# Patient Record
Sex: FEMALE | Race: WHITE | Employment: OTHER | URBAN - METROPOLITAN AREA
[De-identification: names, ages, dates, MRNs, and addresses within clinical notes are randomized per-mention and may not be internally consistent; named-entity substitution may affect disease eponyms.]

---

## 2017-02-17 ENCOUNTER — OFFICE VISIT (OUTPATIENT)
Dept: FAMILY MEDICINE | Facility: CLINIC | Age: 42
End: 2017-02-17
Payer: COMMERCIAL

## 2017-02-17 VITALS
WEIGHT: 167.2 LBS | TEMPERATURE: 97.9 F | DIASTOLIC BLOOD PRESSURE: 63 MMHG | SYSTOLIC BLOOD PRESSURE: 102 MMHG | BODY MASS INDEX: 26.24 KG/M2 | OXYGEN SATURATION: 99 % | HEART RATE: 68 BPM | HEIGHT: 67 IN

## 2017-02-17 DIAGNOSIS — T83.32XA IUD STRINGS LOST: ICD-10-CM

## 2017-02-17 DIAGNOSIS — Z71.6 ENCOUNTER FOR SMOKING CESSATION COUNSELING: ICD-10-CM

## 2017-02-17 DIAGNOSIS — Z12.39 BREAST CANCER SCREENING: ICD-10-CM

## 2017-02-17 DIAGNOSIS — Z01.419 WELL WOMAN EXAM WITH ROUTINE GYNECOLOGICAL EXAM: Primary | ICD-10-CM

## 2017-02-17 PROBLEM — Z30.430 ENCOUNTER FOR INSERTION OF MIRENA IUD: Status: ACTIVE | Noted: 2017-02-17

## 2017-02-17 PROCEDURE — 87624 HPV HI-RISK TYP POOLED RSLT: CPT | Performed by: FAMILY MEDICINE

## 2017-02-17 PROCEDURE — G0145 SCR C/V CYTO,THINLAYER,RESCR: HCPCS | Performed by: FAMILY MEDICINE

## 2017-02-17 PROCEDURE — 99396 PREV VISIT EST AGE 40-64: CPT | Performed by: FAMILY MEDICINE

## 2017-02-17 NOTE — NURSING NOTE
"Chief Complaint   Patient presents with     Physical     NOT fasting       Initial /63 (Cuff Size: Adult Regular)  Pulse 68  Temp 97.9  F (36.6  C) (Tympanic)  Ht 5' 6.5\" (1.689 m)  Wt 167 lb 3.2 oz (75.8 kg)  SpO2 99%  BMI 26.58 kg/m2 Estimated body mass index is 26.58 kg/(m^2) as calculated from the following:    Height as of this encounter: 5' 6.5\" (1.689 m).    Weight as of this encounter: 167 lb 3.2 oz (75.8 kg).  Medication Reconciliation: complete  "

## 2017-02-17 NOTE — PATIENT INSTRUCTIONS
Schedule IUD removal and reinsertion with OBGYN when due      Thank you for choosing [facility].  You may be receiving a survey in the mail from Jamey Beth regarding your visit today.  Please take a few minutes to complete and return the survey to let us know how we are doing.      If you have questions or concerns, please contact us via [employer] or you can contact your care team at [phone].    Our Clinic hours are:  Monday 6:40 am  to 7:00 pm  Tuesday -Friday 6:40 am to 5:00 pm    The [place] outpatient lab hours are:  Monday - Friday 6:10 am to 4:45 pm  Saturdays 7:00 am to 11:00 am  Appointments are required, call [phone]    If you have clinical questions after hours or would like to schedule an appointment,  call the clinic at [phone].  Preventive Health Recommendations  Female Ages 40 to 49    Yearly exam:     See your health care provider every year in order to  1. Review health changes.   2. Discuss preventive care.    3. Review your medicines if your doctor prescribed any.      Get a Pap test every three years (unless you have an abnormal result and your provider advises testing more often).      If you get Pap tests with HPV test, you only need to test every 5 years, unless you have an abnormal result. You do not need a Pap test if your uterus was removed (hysterectomy) and you have not had cancer.      You should be tested each year for STDs (sexually transmitted diseases), if you're at risk.       Ask your doctor if you should have a mammogram.      Have a colonoscopy (test for colon cancer) if someone in your family has had colon cancer or polyps before age 50.       Have a cholesterol test every 5 years.       Have a diabetes test (fasting glucose) after age 45. If you are at risk for diabetes, you should have this test every 3 years.    Shots: Get a flu shot each year. Get a tetanus shot every 10 years.     Nutrition:     Eat at least 5 servings of fruits and vegetables each  day.    Eat whole-grain bread, whole-wheat pasta and brown rice instead of white grains and rice.    Talk to your provider about Calcium and Vitamin D.     Lifestyle    Exercise at least 150 minutes a week (an average of 30 minutes a day, 5 days a week). This will help you control your weight and prevent disease.    Limit alcohol to one drink per day.    No smoking.     Wear sunscreen to prevent skin cancer.    See your dentist every six months for an exam and cleaning.

## 2017-02-17 NOTE — LETTER
[facility]  [address]  [facility] [address]  [phone]      February 28, 2017    Rosalie MOORE [facility]  [address] [place] [id number]    Dear Rosalie,  We are happy to inform you that your PAP smear result from 2/17/17 is normal.  We are now able to do a follow up test on PAP smears. The DNA test is for HPV (Human Papilloma Virus). Cervical cancer is closely linked with certain types of HPV. Your result showed no evidence of high risk HPV.  Therefore we recommend you return in 3-5 years for your next pap smear and HPV test.  You will still need to return to the clinic every year for an annual exam and other preventive tests.  Please contact the clinic with any questions.  Sincerely,  Ge Delgado MD/emi

## 2017-02-17 NOTE — PROGRESS NOTES
SUBJECTIVE:     CC: Rosalie Sauceda is an 42 year old woman who presents for preventive health visit.     Healthy Habits:    Do you get at least three servings of calcium containing foods daily (dairy, green leafy vegetables, etc.)? no    Amount of exercise or daily activities, outside of work: 5 day(s) per week    Problems taking medications regularly not applicable    Medication side effects: No    Have you had an eye exam in the past two years? yes    Do you see a dentist twice per year? No, once per year    Do you have sleep apnea, excessive snoring or daytime drowsiness?no        PROBLEMS TO ADD ON...  Mammogram  Numbness in both hands - more left than right however all fingers of the hand feel heavy when wakes up  In the morning    Today's PHQ-2 Score:   PHQ-2 ( 1999 Pf[employer]) 2/17/2017 11/12/2015   Q1: Little interest or pleasure in doing things 0 0   Q2: Feeling down, depressed or hopeless 0 0   PHQ-2 Score 0 0       Abuse: Current or Past(Physical, Sexual or Emotional)- No  Do you feel safe in your environment - Yes    Social History   Substance Use Topics     Smoking status: Current Every Day Smoker     Packs/day: 1.00     Years: 12.00     Types: Cigars, Cigarettes     Smokeless tobacco: Not on file      Comment: not ready yet 11/12/15     Alcohol use Yes      Comment: 10 drinks per month     The patient does not drink >3 drinks per day nor >7 drinks per week.    Recent Labs   Lab Test  05/20/15   0926   CHOL  182   HDL  46*   LDL  120   TRIG  81   CHOLHDLRATIO  4.0       Reviewed orders with patient.  Reviewed health maintenance and updated orders accordingly - Yes    Mammo Decision Support:  Patient under age 50, mutual decision reflected in health maintenance.      Pertinent mammograms are reviewed under the imaging tab.  History of abnormal Pap smear: NO - age 30-65 PAP every 5 years with negative HPV co-testing recommended  All Histories reviewed and updated in Epic.    ROS:  C: NEGATIVE for fever,  "chills, change in weight  I: NEGATIVE for worrisome rashes, moles or lesions  E: NEGATIVE for vision changes or irritation  ENT: NEGATIVE for ear, mouth and throat problems  R: NEGATIVE for significant cough or SOB  B: NEGATIVE for masses, tenderness or discharge  CV: NEGATIVE for chest pain, palpitations or peripheral edema  GI: NEGATIVE for nausea, abdominal pain, heartburn, or change in bowel habits  : NEGATIVE for unusual urinary or vaginal symptoms. Periods are very minimal and rare with Mirena IUD.  M: NEGATIVE for significant arthralgias or myalgia  N: NEGATIVE for weakness, dizziness or paresthesias  P: NEGATIVE for changes in mood or affect    Problem list, Medication list, Allergies, and Medical/Social/Surgical histories reviewed in EPIC and updated as appropriate.  OBJECTIVE:     /63 (Cuff Size: Adult Regular)  Pulse 68  Temp 97.9  F (36.6  C) (Tympanic)  Ht 5' 6.5\" (1.689 m)  Wt 167 lb 3.2 oz (75.8 kg)  SpO2 99%  BMI 26.58 kg/m2  EXAM:  GENERAL: healthy, alert and no distress  EYES: Eyes grossly normal to inspection, PERRL and conjunctivae and sclerae normal  HENT: ear canals and TM's normal, nose and mouth without ulcers or lesions  NECK: no adenopathy, no asymmetry, masses, or scars and thyroid normal to palpation  RESP: lungs clear to auscultation - no rales, rhonchi or wheezes  BREAST: normal without masses, tenderness or nipple discharge and no palpable axillary masses or adenopathy  CV: regular rate and rhythm, normal S1 S2, no S3 or S4, no murmur, click or rub, no peripheral edema and peripheral pulses strong  ABDOMEN: soft, nontender, no hepatosplenomegaly, no masses and bowel sounds normal   (female): no IUD strings seen.  normal female external genitalia, normal urethral meatus, vaginal mucosa pink, moist, well rugated, and normal cervix/adnexa/uterus without masses or discharge  MS: no gross musculoskeletal defects noted, no edema  SKIN: no suspicious lesions or rashes  NEURO: Normal " "strength and tone, mentation intact and speech normal  PSYCH: mentation appears normal, affect normal/bright    ASSESSMENT/PLAN:     Rosalie was seen today for physical.    Diagnoses and all orders for this visit:    Well woman exam with routine gynecological exam  -     Pap imaged thin layer screen with HPV - recommended age 30 - 65 years (select HPV order below)  -     HPV High Risk Types DNA Cervical    Breast cancer screening  -     *MA Screening Digital Bilateral; Future    Encounter for smoking cessation counseling  -     nicotine polacrilex (NICOTINE MINI) 4 MG lozenge; Place 1 lozenge (4 mg) inside cheek as needed for smoking cessation    IUD strings lost: had Xray of sacrum that showed IUD and is not having periods so likely still in place will schedule removal and replacement with OBGYN        COUNSELING:   Reviewed preventive health counseling, as reflected in patient instructions       Regular exercise       Healthy diet/nutrition       Vision screening       Contraception         reports that she has been smoking Cigars and Cigarettes.  She has a 12.00 pack-year smoking history. She does not have any smokeless tobacco history on file.  Tobacco Cessation Action Plan: Has quit 1 week ago with lozenges and would like refill  Estimated body mass index is 26.58 kg/(m^2) as calculated from the following:    Height as of this encounter: 5' 6.5\" (1.689 m).    Weight as of this encounter: 167 lb 3.2 oz (75.8 kg).   Weight management plan: Discussed healthy diet and exercise guidelines and patient will follow up in 12 months in clinic to re-evaluate.    Counseling Resources:  ATP IV Guidelines  Pooled Cohorts Equation Calculator  Breast Cancer Risk Calculator  FRAX Risk Assessment  ICSI Preventive Guidelines  Dietary Guidelines for Americans, 2010  USDA's MyPlate  ASA Prophylaxis  Lung CA Screening    Ge Delgado MD  [facility]  "

## 2017-02-17 NOTE — MR AVS SNAPSHOT
After Visit Summary   2/17/2017    Rosalie Sauceda    MRN: [id number]           Patient Information     Date Of Birth          1975        Visit Information        Provider Department      2/17/2017 9:40 AM Ge Delgado MD [facility]        Today's Diagnoses     Well woman exam with routine gynecological exam    -  1    Breast cancer screening        Encounter for smoking cessation counseling        IUD strings lost          Care Instructions    Schedule IUD removal and reinsertion with OBGYN when due      Thank you for choosing [facility].  You may be receiving a survey in the mail from Jamey Beth regarding your visit today.  Please take a few minutes to complete and return the survey to let us know how we are doing.      If you have questions or concerns, please contact us via [email or web address] or you can contact your care team at [phone].    Our Clinic hours are:  Monday 6:40 am  to 7:00 pm  Tuesday -Friday 6:40 am to 5:00 pm    The [place] outpatient lab hours are:  Monday - Friday 6:10 am to 4:45 pm  Saturdays 7:00 am to 11:00 am  Appointments are required, call [phone]    If you have clinical questions after hours or would like to schedule an appointment,  call the clinic at [phone].  Preventive Health Recommendations  Female Ages 40 to 49    Yearly exam:     See your health care provider every year in order to  1. Review health changes.   2. Discuss preventive care.    3. Review your medicines if your doctor prescribed any.      Get a Pap test every three years (unless you have an abnormal result and your provider advises testing more often).      If you get Pap tests with HPV test, you only need to test every 5 years, unless you have an abnormal result. You do not need a Pap test if your uterus was removed (hysterectomy) and you have not had cancer.      You should be tested each year for STDs (sexually transmitted diseases), if you're at risk.       Ask  your doctor if you should have a mammogram.      Have a colonoscopy (test for colon cancer) if someone in your family has had colon cancer or polyps before age 50.       Have a cholesterol test every 5 years.       Have a diabetes test (fasting glucose) after age 45. If you are at risk for diabetes, you should have this test every 3 years.    Shots: Get a flu shot each year. Get a tetanus shot every 10 years.     Nutrition:     Eat at least 5 servings of fruits and vegetables each day.    Eat whole-grain bread, whole-wheat pasta and brown rice instead of white grains and rice.    Talk to your provider about Calcium and Vitamin D.     Lifestyle    Exercise at least 150 minutes a week (an average of 30 minutes a day, 5 days a week). This will help you control your weight and prevent disease.    Limit alcohol to one drink per day.    No smoking.     Wear sunscreen to prevent skin cancer.    See your dentist every six months for an exam and cleaning.        Follow-ups after your visit        Future tests that were ordered for you today     Open Future Orders        Priority Expected Expires Ordered    *MA Screening Digital Bilateral Routine  2/17/2018 2/17/2017            Who to contact     If you have questions or need follow up information about today's clinic visit or your schedule please contact [facility] directly at [phone].  Normal or non-critical lab and imaging results will be communicated to you by MyChart, letter or phone within 4 business days after the clinic has received the results. If you do not hear from us within 7 days, please contact the clinic through [employer] or phone. If you have a critical or abnormal lab result, we will notify you by phone as soon as possible.  Submit refill requests through [employer] or call your pharmacy and they will forward the refill request to us. Please allow 3 business days for your refill to be completed.          Additional Information About Your  "Visit        [employer] Information     [employer] lets you send messages to your doctor, view your test results, renew your prescriptions, schedule appointments and more. To sign up, go to [email or web address] . Click on \"Log in\" on the left side of the screen, which will take you to the Welcome page. Then click on \"Sign up Now\" on the right side of the page.     You will be asked to enter the access code listed below, as well as some personal information. Please follow the directions to create your username and password.     Your access code is: [email or web address]  Expires: 2017 10:21 AM     Your access code will  in 90 days. If you need help or a new code, please call your [place] clinic or [phone].        Care EveryWhere ID     This is your Care EveryWhere ID. This could be used by other organizations to access your [place] medical records  [id number]        Your Vitals Were     Pulse Temperature Height Pulse Oximetry BMI (Body Mass Index)       68 97.9  F (36.6  C) (Tympanic) 5' 6.5\" (1.689 m) 99% 26.58 kg/m2        Blood Pressure from Last 3 Encounters:   17 102/63   16 134/86   16 123/86    Weight from Last 3 Encounters:   17 167 lb 3.2 oz (75.8 kg)   16 165 lb (74.8 kg)   11/12/15 161 lb 6.4 oz (73.2 kg)              We Performed the Following     HPV High Risk Types DNA Cervical     Pap imaged thin layer screen with HPV - recommended age 30 - 65 years (select HPV order below)          Today's Medication Changes          These changes are accurate as of: 17 10:21 AM.  If you have any questions, ask your nurse or doctor.               Start taking these medicines.        Dose/Directions    nicotine polacrilex 4 MG lozenge   Commonly known as:  NICOTINE MINI   Used for:  Encounter for smoking cessation counseling   Started by:  Ge Delgado MD        Dose:  4 mg   Place 1 lozenge (4 mg) inside cheek as needed for smoking cessation   Quantity:  48 tablet "   Refills:  1         These medicines have changed or have updated prescriptions.        Dose/Directions    fluticasone 50 MCG/ACT spray   Commonly known as:  FLONASE   This may have changed:    - when to take this  - reasons to take this   Used for:  Otitis media with effusion, bilateral        Dose:  1-2 spray   Spray 1-2 sprays into both nostrils daily   Quantity:  1 Bottle   Refills:  11            Where to get your medicines      These medications were sent to [place], [place] - [address] [facility] [facility]  [address] [facility] [address]     Phone:  [phone]     nicotine polacrilex 4 MG lozenge                Primary Care Provider Office Phone #    [facility] [phone] [address] [facility] [address]        Thank you!     Thank you for choosing [facility]  for your care. Our goal is always to provide you with excellent care. Hearing back from our patients is one way we can continue to improve our services. Please take a few minutes to complete the written survey that you may receive in the mail after your visit with us. Thank you!             Your Updated Medication List - Protect others around you: Learn how to safely use, store and throw away your medicines at www.disposemymeds.org.          This list is accurate as of: 2/17/17 10:21 AM.  Always use your most recent med list.                   Brand Name Dispense Instructions for use    fluticasone 50 MCG/ACT spray    FLONASE    1 Bottle    Spray 1-2 sprays into both nostrils daily       nicotine polacrilex 4 MG lozenge    NICOTINE MINI    48 tablet    Place 1 lozenge (4 mg) inside cheek as needed for smoking cessation

## 2017-02-21 LAB
COPATH REPORT: NORMAL
PAP: NORMAL

## 2017-02-22 LAB
FINAL DIAGNOSIS: NORMAL
HPV HR 12 DNA CVX QL NAA+PROBE: NEGATIVE
HPV16 DNA SPEC QL NAA+PROBE: NEGATIVE
HPV18 DNA SPEC QL NAA+PROBE: NEGATIVE
SPECIMEN DESCRIPTION: NORMAL

## 2017-02-27 ENCOUNTER — HOSPITAL ENCOUNTER (OUTPATIENT)
Dept: MAMMOGRAPHY | Facility: CLINIC | Age: 42
Discharge: HOME | End: 2017-02-27
Attending: FAMILY MEDICINE | Admitting: FAMILY MEDICINE
Payer: COMMERCIAL

## 2017-02-27 DIAGNOSIS — Z12.31 VISIT FOR SCREENING MAMMOGRAM: ICD-10-CM

## 2017-02-27 PROCEDURE — G0202 SCR MAMMO BI INCL CAD: HCPCS

## 2017-04-12 ENCOUNTER — OFFICE VISIT (OUTPATIENT)
Dept: OBGYN | Facility: CLINIC | Age: 42
End: 2017-04-12
Payer: COMMERCIAL

## 2017-04-12 VITALS
HEIGHT: 67 IN | BODY MASS INDEX: 27.34 KG/M2 | HEART RATE: 74 BPM | SYSTOLIC BLOOD PRESSURE: 126 MMHG | WEIGHT: 174.2 LBS | DIASTOLIC BLOOD PRESSURE: 75 MMHG

## 2017-04-12 DIAGNOSIS — Z97.5 PRESENCE OF INTRAUTERINE CONTRACEPTIVE DEVICE (IUD): ICD-10-CM

## 2017-04-12 DIAGNOSIS — Z30.430 ENCOUNTER FOR INSERTION OF MIRENA IUD: Primary | ICD-10-CM

## 2017-04-12 DIAGNOSIS — Z30.432 ENCOUNTER FOR IUD REMOVAL: ICD-10-CM

## 2017-04-12 PROCEDURE — 58300 INSERT INTRAUTERINE DEVICE: CPT | Performed by: OBSTETRICS & GYNECOLOGY

## 2017-04-12 PROCEDURE — 58301 REMOVE INTRAUTERINE DEVICE: CPT | Performed by: OBSTETRICS & GYNECOLOGY

## 2017-04-12 PROCEDURE — 99202 OFFICE O/P NEW SF 15 MIN: CPT | Mod: 25 | Performed by: OBSTETRICS & GYNECOLOGY

## 2017-04-12 NOTE — NURSING NOTE
"Initial /75 (BP Location: Left arm, Cuff Size: Adult Regular)  Pulse 74  Ht 5' 6.5\" (1.689 m)  Wt 174 lb 3.2 oz (79 kg)  BMI 27.7 kg/m2 Estimated body mass index is 27.7 kg/(m^2) as calculated from the following:    Height as of this encounter: 5' 6.5\" (1.689 m).    Weight as of this encounter: 174 lb 3.2 oz (79 kg). .      "

## 2017-04-12 NOTE — PROGRESS NOTES
"CC:  IUD insertion  HPI:  Rosalie Sauceda is a 42 year old female No LMP recorded. Patient is not currently having periods (Reason: IUD).  Menses are rare,.   No other c/o.  She is here for an IUD insertion. Patient has verbalized understanding of risks and benefits and has signed the consent form.    Past GYN history:  No STD history       Last PAP smear:  Normal  Manogomous relationship? Yes    The patient's past medical history, social history and family history is reviewed at our visit and updated in EPIC.    Allergies: Amoxicillin and Penicillins    Current Outpatient Prescriptions   Medication Sig Dispense Refill     nicotine polacrilex (NICOTINE MINI) 4 MG lozenge Place 1 lozenge (4 mg) inside cheek as needed for smoking cessation 48 tablet 1     fluticasone (FLONASE) 50 MCG/ACT nasal spray Spray 1-2 sprays into both nostrils daily (Patient taking differently: Spray 1-2 sprays into both nostrils as needed ) 1 Bottle 11         ROS:  C: NEGATIVE for fever, chills, change in weight  R: NEGATIVE for significant cough or SOB  CV: NEGATIVE for chest pain, palpitations or peripheral edema  GI: NEGATIVE for nausea, abdominal pain, heartburn, or change in bowel habits  : NEGATIVE for frequency, dysuria, hematuria, vaginal discharge, or irregular bleeding      EXAM:  Blood pressure 126/75, pulse 74, height 5' 6.5\" (1.689 m), weight 174 lb 3.2 oz (79 kg), not currently breastfeeding.  General - pleasant female in no acute distress.  Pelvic - EG: normal adult female, BUS: within normal limits, Vagina: well rugated, no discharge, Cervix: no lesions or CMT, Uterus: firm, normal sized and nontender, Adnexae: no masses or tenderness.    PROCEDURE:  After informed consent was obtained from the patient, a speculum was placed in the vagina to visualized the cervix.  The cervix was then swabbed with a betadine prep and 1% lidocaine paracervical block applied.  Tenaculum was placed at the 12 o'clock position on the cervix "   Procedure:  The  IUD strings were grasped with ring forcep and IUD easily removed intact with minimal patient discomfort noted.  No bleeding noted.and the uterus sounded to 7.5cm.  The Mirena  IUD was then placed in the usual fashion under sterile technique.  Strings were clipped about 2 cm from the cervical os.  Tenaculum was removed and cervix was hemostatic. There were no complications. The patient tolerated the procedure well.    lot # [id number], exp. 12/19    NDC#:97038-871-37 (MIRENA)      ASSESSMENT/PLAN:  (Z30.640) Encounter for insertion of mirena IUD  (primary encounter diagnosis)  Comment: replacement    (Z97.5) Presence of intrauterine contraceptive device (IUD)  Comment: Mirena at the end of its useful life  Plan:    (Z36.522) Encounter for IUD removal  Comment:   Plan:          The patient should feel for the IUD strings after her next menses.  If unable to locate them, she should return to clinic for a speculum examination for confirmation that the IUD is in place. Bleeding pattern of this particular IUD was discussed with the patient. She is aware that the IUD will need to be removed in 5 years or PRN.  She is to return to clinic for her next annual or PRN.      Ke Cox

## 2017-04-12 NOTE — MR AVS SNAPSHOT
"              After Visit Summary   4/12/2017    Rosalie Sauceda    MRN: [id number]           Patient Information     Date Of Birth          1975        Visit Information        Provider Department      4/12/2017 10:30 AM Ke Cox MD [facility]        Today's Diagnoses     Encounter for insertion of mirena IUD    -  1    Presence of intrauterine contraceptive device (IUD)        Encounter for IUD removal           Follow-ups after your visit        Follow-up notes from your care team     Return in about 1 year (around 4/12/2018).      Who to contact     If you have questions or need follow up information about today's clinic visit or your schedule please contact [facility] directly at [phone].  Normal or non-critical lab and imaging results will be communicated to you by MyChart, letter or phone within 4 business days after the clinic has received the results. If you do not hear from us within 7 days, please contact the clinic through [employer] or phone. If you have a critical or abnormal lab result, we will notify you by phone as soon as possible.  Submit refill requests through [employer] or call your pharmacy and they will forward the refill request to us. Please allow 3 business days for your refill to be completed.          Additional Information About Your Visit        MyChart Information     [employer] lets you send messages to your doctor, view your test results, renew your prescriptions, schedule appointments and more. To sign up, go to [email or web address] . Click on \"Log in\" on the left side of the screen, which will take you to the Welcome page. Then click on \"Sign up Now\" on the right side of the page.     You will be asked to enter the access code listed below, as well as some personal information. Please follow the directions to create your username and password.     Your access code is: [email or web address]  Expires: 5/18/2017 11:21 AM     Your access code will " " in 90 days. If you need help or a new code, please call your [place] clinic or [phone].        Care EveryWhere ID     This is your Care EveryWhere ID. This could be used by other organizations to access your [place] medical records  [id number]        Your Vitals Were     Pulse Height BMI (Body Mass Index)             74 5' 6.5\" (1.689 m) 27.7 kg/m2          Blood Pressure from Last 3 Encounters:   17 126/75   17 102/63   16 134/86    Weight from Last 3 Encounters:   17 174 lb 3.2 oz (79 kg)   17 167 lb 3.2 oz (75.8 kg)   16 165 lb (74.8 kg)              We Performed the Following     INSERTION INTRAUTERINE DEVICE     REMOVE INTRAUTERINE DEVICE          Today's Medication Changes          These changes are accurate as of: 17 11:25 AM.  If you have any questions, ask your nurse or doctor.               Start taking these medicines.        Dose/Directions    levonorgestrel 20 MCG/24HR IUD   Commonly known as:  MIRENA   Used for:  Encounter for insertion of mirena IUD   Started by:  Ke Cox MD        Dose:  1 each   1 each (20 mcg) by Intrauterine route once for 1 dose   Quantity:  1 each   Refills:  0         These medicines have changed or have updated prescriptions.        Dose/Directions    fluticasone 50 MCG/ACT spray   Commonly known as:  FLONASE   This may have changed:    - when to take this  - reasons to take this   Used for:  Otitis media with effusion, bilateral        Dose:  1-2 spray   Spray 1-2 sprays into both nostrils daily   Quantity:  1 Bottle   Refills:  11            Where to get your medicines      Some of these will need a paper prescription and others can be bought over the counter.  Ask your nurse if you have questions.     You don't need a prescription for these medications     levonorgestrel 20 MCG/24HR IUD                Primary Care Provider Office Phone #    [place] [facility] [phone] [address]  [facility] [place] [address]        Thank you!     Thank you for choosing [facility]  for your care. Our goal is always to provide you with excellent care. Hearing back from our patients is one way we can continue to improve our services. Please take a few minutes to complete the written survey that you may receive in the mail after your visit with us. Thank you!             Your Updated Medication List - Protect others around you: Learn how to safely use, store and throw away your medicines at www.disposemymeds.org.          This list is accurate as of: 4/12/17 11:25 AM.  Always use your most recent med list.                   Brand Name Dispense Instructions for use    fluticasone 50 MCG/ACT spray    FLONASE    1 Bottle    Spray 1-2 sprays into both nostrils daily       levonorgestrel 20 MCG/24HR IUD    MIRENA    1 each    1 each (20 mcg) by Intrauterine route once for 1 dose       nicotine polacrilex 4 MG lozenge    NICOTINE MINI    48 tablet    Place 1 lozenge (4 mg) inside cheek as needed for smoking cessation

## 2017-08-03 ENCOUNTER — OFFICE VISIT (OUTPATIENT)
Dept: FAMILY MEDICINE | Facility: CLINIC | Age: 42
End: 2017-08-03
Payer: COMMERCIAL

## 2017-08-03 VITALS
HEIGHT: 67 IN | WEIGHT: 170.6 LBS | BODY MASS INDEX: 26.78 KG/M2 | TEMPERATURE: 97.6 F | HEART RATE: 84 BPM | SYSTOLIC BLOOD PRESSURE: 112 MMHG | DIASTOLIC BLOOD PRESSURE: 75 MMHG

## 2017-08-03 DIAGNOSIS — Z23 ENCOUNTER FOR IMMUNIZATION: ICD-10-CM

## 2017-08-03 DIAGNOSIS — M77.12 LATERAL EPICONDYLITIS OF LEFT ELBOW: Primary | ICD-10-CM

## 2017-08-03 PROCEDURE — 99212 OFFICE O/P EST SF 10 MIN: CPT | Mod: 25 | Performed by: NURSE PRACTITIONER

## 2017-08-03 PROCEDURE — 90471 IMMUNIZATION ADMIN: CPT | Performed by: NURSE PRACTITIONER

## 2017-08-03 PROCEDURE — 90715 TDAP VACCINE 7 YRS/> IM: CPT | Performed by: NURSE PRACTITIONER

## 2017-08-03 NOTE — NURSING NOTE
"Chief Complaint   Patient presents with     Elbow Pain     x2 months        Initial /75  Pulse 84  Temp 97.6  F (36.4  C) (Tympanic)  Ht 5' 6.5\" (1.689 m)  Wt 170 lb 9.6 oz (77.4 kg)  BMI 27.12 kg/m2 Estimated body mass index is 27.12 kg/(m^2) as calculated from the following:    Height as of this encounter: 5' 6.5\" (1.689 m).    Weight as of this encounter: 170 lb 9.6 oz (77.4 kg).  Medication Reconciliation: complete  "

## 2017-08-03 NOTE — PROGRESS NOTES
"  SUBJECTIVE:                                                    Rosalie Sauceda is a 42 year old female who presents to clinic today for the following health issues:  Left elbow pain for about 1 month. The patient works as  and she also exercise regularly perform upper body weight lifting. Noted lateral side left elbow mild swelling, tried icing and Alive.      Left elbow pain     Onset: 2 months     Description:   Location: left elbow  Character: Sharp pain to the touch and Dull ache    Intensity: moderate    Progression of Symptoms: worse    Accompanying Signs & Symptoms:  Other symptoms: none     History:   Previous similar pain: no       Precipitating factors:   Trauma or overuse: no     Alleviating factors:  Improved by: nothing    Therapies Tried and outcome: Aleve     Problem list and histories reviewed & adjusted, as indicated.  Additional history: as documented    Labs reviewed in EPIC    Reviewed and updated as needed this visit by clinical staffTobacco  Allergies  Med Hx  Surg Hx  Fam Hx  Soc Hx      Reviewed and updated as needed this visit by Provider         ROS:  Constitutional, HEENT, cardiovascular, pulmonary, gi and gu systems are negative, except as otherwise noted.      OBJECTIVE:   /75  Pulse 84  Temp 97.6  F (36.4  C) (Tympanic)  Ht 5' 6.5\" (1.689 m)  Wt 170 lb 9.6 oz (77.4 kg)  BMI 27.12 kg/m2  Body mass index is 27.12 kg/(m^2).  GENERAL: healthy, alert and no distress  MS: lateral left elbow mildly swollen and slightly tender to palpation, full ROM   SKIN: no suspicious lesions or rashes    Diagnostic Test Results:  none     ASSESSMENT/PLAN:     1. Lateral epicondylitis of left elbow  -discussed bracing, exercises, ice packs and NSAID's  -avoid heavy lifting and upper body heavy lifting exercises  -physical therapy if no improvement in 1 week  -ortho consult if continue to have pain     2. Encounter for immunization  - TDAP VACCINE (ADACEL)  - ADMIN 1st " VACCINE    See Patient Instructions    ANDREW [facility]

## 2017-08-03 NOTE — PATIENT INSTRUCTIONS
Epicondylitis    physical therapy if no improvement in 1 week    Avoid heavy lifting lifting and pushing      Advil as needed for pain    Ice packs    Wear elbow brace, or elastic elbow sleeve     Follow up with ortho if no improvement

## 2017-08-03 NOTE — MR AVS SNAPSHOT
"              After Visit Summary   8/3/2017    Rosalie Sauceda    MRN: [id number]           Patient Information     Date Of Birth          1975        Visit Information        Provider Department      8/3/2017 12:40 PM Peggy Cai APRN CNP [facility]        Today's Diagnoses     Encounter for immunization    -  1      Care Instructions    Epicondylitis    physical therapy if no improvement in 1 week\    Avoid heavy lifting lifting and pushing      Advil as needed for pain    Ice packs    Wear band, or elastic elbow sleeve     Follow up with ortho if no improvement            Follow-ups after your visit        Who to contact     If you have questions or need follow up information about today's clinic visit or your schedule please contact [facility] directly at [phone].  Normal or non-critical lab and imaging results will be communicated to you by [employer], letter or phone within 4 business days after the clinic has received the results. If you do not hear from us within 7 days, please contact the clinic through [employer] or phone. If you have a critical or abnormal lab result, we will notify you by phone as soon as possible.  Submit refill requests through [employer] or call your pharmacy and they will forward the refill request to us. Please allow 3 business days for your refill to be completed.          Additional Information About Your Visit        [employer] Information     [employer] lets you send messages to your doctor, view your test results, renew your prescriptions, schedule appointments and more. To sign up, go to [email or web address] . Click on \"Log in\" on the left side of the screen, which will take you to the Welcome page. Then click on \"Sign up Now\" on the right side of the page.     You will be asked to enter the access code listed below, as well as some personal information. Please follow the directions to create your username and password.     Your access " "code is: [email or web address]  Expires: 2017  1:09 PM     Your access code will  in 90 days. If you need help or a new code, please call your [place] clinic or [phone].        Care EveryWhere ID     This is your Care EveryWhere ID. This could be used by other organizations to access your [place] medical records  [id number]        Your Vitals Were     Pulse Temperature Height BMI (Body Mass Index)          84 97.6  F (36.4  C) (Tympanic) 5' 6.5\" (1.689 m) 27.12 kg/m2         Blood Pressure from Last 3 Encounters:   17 112/75   17 126/75   17 102/63    Weight from Last 3 Encounters:   17 170 lb 9.6 oz (77.4 kg)   17 174 lb 3.2 oz (79 kg)   17 167 lb 3.2 oz (75.8 kg)              We Performed the Following     ADMIN 1st VACCINE     TDAP VACCINE (ADACEL)        Primary Care Provider Office Phone #    [facility] [phone] [address] [address]        Equal Access to Services     YASMEEN JOHNSON AH: Hadii donn mckeono Somj, waaxda luqadaha, qaybta kaalmada adeegyada, samantha maloney. So [facility] [place] [phone].    ATENCIÓN: Si habla español, tiene a atkins disposición servicios gratuitos de asistencia lingüística. Mary al [phone].    We comply with applicable federal civil rights laws and [place] laws. We do not discriminate on the basis of race, color, national origin, age, disability sex, sexual orientation or gender identity.            Thank you!     Thank you for choosing [facility]  for your care. Our goal is always to provide you with excellent care. Hearing back from our patients is one way we can continue to improve our services. Please take a few minutes to complete the written survey that you may receive in the mail after your visit with us. Thank you!             Your Updated Medication List - Protect others around you: Learn how to safely use, store and throw away your " medicines at www.disposemymeds.org.          This list is accurate as of: 8/3/17  1:09 PM.  Always use your most recent med list.                   Brand Name Dispense Instructions for use Diagnosis    fluticasone 50 MCG/ACT spray    FLONASE    1 Bottle    Spray 1-2 sprays into both nostrils daily    Otitis media with effusion, bilateral       levonorgestrel 20 MCG/24HR IUD    MIRENA    1 each    1 each (20 mcg) by Intrauterine route once for 1 dose    Encounter for insertion of mirena IUD       nicotine polacrilex 4 MG lozenge    NICOTINE MINI    48 tablet    Place 1 lozenge (4 mg) inside cheek as needed for smoking cessation    Encounter for smoking cessation counseling

## 2017-09-13 ENCOUNTER — OFFICE VISIT (OUTPATIENT)
Dept: FAMILY MEDICINE | Facility: CLINIC | Age: 42
End: 2017-09-13
Payer: COMMERCIAL

## 2017-09-13 VITALS
SYSTOLIC BLOOD PRESSURE: 133 MMHG | HEART RATE: 93 BPM | WEIGHT: 168 LBS | TEMPERATURE: 97.8 F | HEIGHT: 70 IN | DIASTOLIC BLOOD PRESSURE: 82 MMHG | BODY MASS INDEX: 24.05 KG/M2

## 2017-09-13 DIAGNOSIS — R82.90 NONSPECIFIC FINDING ON EXAMINATION OF URINE: ICD-10-CM

## 2017-09-13 DIAGNOSIS — Z71.6 ENCOUNTER FOR TOBACCO USE CESSATION COUNSELING: ICD-10-CM

## 2017-09-13 DIAGNOSIS — R30.0 DYSURIA: Primary | ICD-10-CM

## 2017-09-13 LAB
ALBUMIN UR-MCNC: NEGATIVE MG/DL
APPEARANCE UR: ABNORMAL
BACTERIA #/AREA URNS HPF: ABNORMAL /HPF
BILIRUB UR QL STRIP: NEGATIVE
COLOR UR AUTO: YELLOW
GLUCOSE UR STRIP-MCNC: NEGATIVE MG/DL
HGB UR QL STRIP: ABNORMAL
KETONES UR STRIP-MCNC: NEGATIVE MG/DL
LEUKOCYTE ESTERASE UR QL STRIP: ABNORMAL
NITRATE UR QL: NEGATIVE
NON-SQ EPI CELLS #/AREA URNS LPF: ABNORMAL /LPF
PH UR STRIP: 6 PH (ref 5–7)
RBC #/AREA URNS AUTO: ABNORMAL /HPF
SOURCE: ABNORMAL
SP GR UR STRIP: <=1.005 (ref 1–1.03)
UROBILINOGEN UR STRIP-ACNC: 0.2 EU/DL (ref 0.2–1)
WBC #/AREA URNS AUTO: ABNORMAL /HPF

## 2017-09-13 PROCEDURE — 87086 URINE CULTURE/COLONY COUNT: CPT | Performed by: NURSE PRACTITIONER

## 2017-09-13 PROCEDURE — 81001 URINALYSIS AUTO W/SCOPE: CPT | Performed by: NURSE PRACTITIONER

## 2017-09-13 PROCEDURE — 99214 OFFICE O/P EST MOD 30 MIN: CPT | Performed by: NURSE PRACTITIONER

## 2017-09-13 RX ORDER — NITROFURANTOIN 25; 75 MG/1; MG/1
100 CAPSULE ORAL 2 TIMES DAILY
Qty: 14 CAPSULE | Refills: 0 | Status: SHIPPED | OUTPATIENT
Start: 2017-09-13 | End: 2017-11-22

## 2017-09-13 NOTE — MR AVS SNAPSHOT
After Visit Summary   9/13/2017    Rosalie Sauceda    MRN: [id number]           Patient Information     Date Of Birth          1975        Visit Information        Provider Department      9/13/2017 9:40 AM Namrata Pacheco APRN CNP [facility]        Today's Diagnoses     Dysuria    -  1    Encounter for tobacco use cessation counseling        Nonspecific finding on examination of urine          Care Instructions    1. Take antibiotics twice a day for 1 week  2. Start taking Cranberry supplement  3. Increase drinking fluid intake      Anatomy of the Female Urinary Tract  Your urinary tract helps get rid of urine (your body s liquid waste). The kidneys collect chemicals and water your body doesn t need. This is turned into urine. Urine travels out of the kidneys through the ureters to the bladder. The bladder holds urine until you re ready to release it. The urethra carries urine from the bladder out of the body. The main sphincter muscle circles the mid-urethra.      Front view of female urinary tract.     Date Last Reviewed: 1/1/2017 2000-2017 [employer]. [address] [place] [address]. All rights reserved. This information is not intended as a substitute for professional medical care. Always follow your healthcare professional's instructions.                Follow-ups after your visit        Who to contact     If you have questions or need follow up information about today's clinic visit or your schedule please contact [facility] directly at [phone].  Normal or non-critical lab and imaging results will be communicated to you by MyChart, letter or phone within 4 business days after the clinic has received the results. If you do not hear from us within 7 days, please contact the clinic through MyChart or phone. If you have a critical or abnormal lab result, we will notify you by phone as soon as possible.  Submit refill requests  "through [employer] or call your pharmacy and they will forward the refill request to us. Please allow 3 business days for your refill to be completed.          Additional Information About Your Visit        [employer] Information     [employer] lets you send messages to your doctor, view your test results, renew your prescriptions, schedule appointments and more. To sign up, go to [email or web address] . Click on \"Log in\" on the left side of the screen, which will take you to the Welcome page. Then click on \"Sign up Now\" on the right side of the page.     You will be asked to enter the access code listed below, as well as some personal information. Please follow the directions to create your username and password.     Your access code is: [email or web address]  Expires: 2017  1:09 PM     Your access code will  in 90 days. If you need help or a new code, please call your [place] clinic or [phone].        Care EveryWhere ID     This is your Care EveryWhere ID. This could be used by other organizations to access your [place] medical records  [id number]        Your Vitals Were     Pulse Temperature Height BMI (Body Mass Index)          93 97.8  F (36.6  C) (Tympanic) 5' 9.5\" (1.765 m) 24.45 kg/m2         Blood Pressure from Last 3 Encounters:   17 133/82   17 112/75   17 126/75    Weight from Last 3 Encounters:   17 168 lb (76.2 kg)   17 170 lb 9.6 oz (77.4 kg)   17 174 lb 3.2 oz (79 kg)              We Performed the Following     UA reflex to Microscopic and Culture     Urine Microscopic          Today's Medication Changes          These changes are accurate as of: 17  9:58 AM.  If you have any questions, ask your nurse or doctor.               Start taking these medicines.        Dose/Directions    nitroFURantoin (macrocrystal-monohydrate) 100 MG capsule   Commonly known as:  MACROBID   Used for:  Dysuria        Dose:  100 mg   Take 1 capsule (100 mg) by mouth 2 times daily "   Quantity:  14 capsule   Refills:  0            Where to get your medicines      These medications were sent to [facility] - [place], [place] - [address]  [address] [address]     Phone:  [phone]     nitroFURantoin (macrocrystal-monohydrate) 100 MG capsule                Primary Care Provider Office Phone #    [facility] [phone] [address] [address]        Equal Access to Services     YASMEEN JOHNSON : Hadii aad ku hadasho Soomaali, waaxda luqadaha, qaybta kaalmada adeegyada, waxay idiin hayaan adeeg kharazion maloney. So [facility] [phone].    ATENCIÓN: Si habla español, tiene a atkins disposición servicios gratuitos de asistencia lingüística. Ll[facility] [phone].    We comply with applicable federal civil rights laws and [place] laws. We do not discriminate on the basis of race, color, national origin, age, disability sex, sexual orientation or gender identity.            Thank you!     Thank you for choosing [facility]  for your care. Our goal is always to provide you with excellent care. Hearing back from our patients is one way we can continue to improve our services. Please take a few minutes to complete the written survey that you may receive in the mail after your visit with us. Thank you!             Your Updated Medication List - Protect others around you: Learn how to safely use, store and throw away your medicines at www.disposemymeds.org.          This list is accurate as of: 9/13/17  9:58 AM.  Always use your most recent med list.                   Brand Name Dispense Instructions for use Diagnosis    fluticasone 50 MCG/ACT spray    FLONASE    1 Bottle    Spray 1-2 sprays into both nostrils daily    Otitis media with effusion, bilateral       levonorgestrel 20 MCG/24HR IUD    MIRENA    1 each    1 each (20 mcg) by Intrauterine route once for 1 dose    Encounter for insertion of mirena IUD        nicotine polacrilex 4 MG lozenge    NICOTINE MINI    48 tablet    Place 1 lozenge (4 mg) inside cheek as needed for smoking cessation    Encounter for smoking cessation counseling       nitroFURantoin (macrocrystal-monohydrate) 100 MG capsule    MACROBID    14 capsule    Take 1 capsule (100 mg) by mouth 2 times daily    Dysuria

## 2017-09-13 NOTE — NURSING NOTE
"Initial /82 (BP Location: Left arm, Patient Position: Chair, Cuff Size: Adult Regular)  Pulse 93  Temp 97.8  F (36.6  C) (Tympanic)  Ht 5' 9.5\" (1.765 m)  Wt 168 lb (76.2 kg)  BMI 24.45 kg/m2 Estimated body mass index is 24.45 kg/(m^2) as calculated from the following:    Height as of this encounter: 5' 9.5\" (1.765 m).    Weight as of this encounter: 168 lb (76.2 kg). .    Giselle Mendez    "

## 2017-09-13 NOTE — PROGRESS NOTES
SUBJECTIVE:   Rosalie Sauceda is a 42 year old female who presents to clinic today for the following health issues:      URINARY TRACT SYMPTOMS      Duration: this a.m.    Description  dysuria and hematuria- frequency. Patient had left over Macrobid which she started taking this morning.     Intensity:  moderate    Accompanying signs and symptoms:  Fever/chills: no   Flank pain YES- did have last weekend  Nausea and vomiting: no   Vaginal symptoms: none  Abdominal/Pelvic Pain: no     History  History of frequent UTI's: YES- treated 3 times in the last 2 month  History of kidney stones: YES  Sexually Active: YES  Possibility of pregnancy: No    Precipitating or alleviating factors: None    Therapies tried and outcome: Macrobid one time today (old RX)   Outcome: gives instant relief        -------------------------------------    Problem list and histories reviewed & adjusted, as indicated.  Additional history: as documented    Patient Active Problem List   Diagnosis     Varicose veins of lower extremities with complications     TMJ (temporomandibular joint syndrome)     Presence of intrauterine contraceptive device (IUD)     Encounter for tobacco use cessation counseling     Past Surgical History:   Procedure Laterality Date     C/SECTION, LOW TRANSVERSE  6/2006     LIGATN/STRIP LONG & SHORT SAPHEN  7/2007       Social History   Substance Use Topics     Smoking status: Former Smoker     Packs/day: 1.00     Years: 12.00     Types: Cigars, Cigarettes     Smokeless tobacco: Former User     Quit date: 2/10/2017      Comment: not ready yet 11/12/15     Alcohol use Yes      Comment: 10 drinks per month     Family History   Problem Relation Age of Onset     DIABETES Maternal Grandmother      Breast Cancer Maternal Grandmother      Dx in her 70's     Hypertension Maternal Grandmother      CANCER Maternal Grandfather      LUNG     Prostate Cancer Maternal Grandfather      CANCER Father      skin ca             Reviewed and  "updated as needed this visit by clinical staff     Reviewed and updated as needed this visit by Provider         ROS:  Constitutional, HEENT, cardiovascular, pulmonary, GI, , musculoskeletal, neuro, skin, endocrine and psych systems are negative, except as otherwise noted.      OBJECTIVE:   /82 (BP Location: Left arm, Patient Position: Chair, Cuff Size: Adult Regular)  Pulse 93  Temp 97.8  F (36.6  C) (Tympanic)  Ht 5' 9.5\" (1.765 m)  Wt 168 lb (76.2 kg)  BMI 24.45 kg/m2  Body mass index is 24.45 kg/(m^2).  GENERAL: healthy, alert and no distress  RESP: lungs clear to auscultation - no rales, rhonchi or wheezes  CV: regular rate and rhythm, normal S1 S2, no S3 or S4, no murmur, click or rub, no peripheral edema and peripheral pulses strong  ABDOMEN: soft, nontender, no hepatosplenomegaly, no masses and bowel sounds normal  MS: no gross musculoskeletal defects noted, no edema    Diagnostic Test Results:  Results for orders placed or performed in visit on 09/13/17 (from the past 24 hour(s))   UA reflex to Microscopic and Culture   Result Value Ref Range    Color Urine Yellow     Appearance Urine Cloudy     Glucose Urine Negative NEG^Negative mg/dL    Bilirubin Urine Negative NEG^Negative    Ketones Urine Negative NEG^Negative mg/dL    Specific Gravity Urine <=1.005 1.003 - 1.035    Blood Urine Large (A) NEG^Negative    pH Urine 6.0 5.0 - 7.0 pH    Protein Albumin Urine Negative NEG^Negative mg/dL    Urobilinogen Urine 0.2 0.2 - 1.0 EU/dL    Nitrite Urine Negative NEG^Negative    Leukocyte Esterase Urine Moderate (A) NEG^Negative    Source Midstream Urine    Urine Microscopic   Result Value Ref Range    WBC Urine  (A) OTO2^O - 2 /HPF    RBC Urine  (A) OTO2^O - 2 /HPF    Squamous Epithelial /LPF Urine Few FEW^Few /LPF    Bacteria Urine Many (A) NEG^Negative /HPF       ASSESSMENT/PLAN:       1. Dysuria    - UA reflex to Microscopic and Culture  - Urine Microscopic  - nitroFURantoin, " macrocrystal-monohydrate, (MACROBID) 100 MG capsule; Take 1 capsule (100 mg) by mouth 2 times daily  Dispense: 14 capsule; Refill: 0  - start taking Cranberry supplement daily    2. Encounter for tobacco use cessation counseling  Encouraged patient to quit smoking- patient not interested at this time    3. Nonspecific finding on examination of urine    - Urine Culture Aerobic Bacterial    Patient Instructions     1. Take antibiotics twice a day for 1 week  2. Start taking Cranberry supplement  3. Increase drinking fluid intake      Anatomy of the Female Urinary Tract  Your urinary tract helps get rid of urine (your body s liquid waste). The kidneys collect chemicals and water your body doesn t need. This is turned into urine. Urine travels out of the kidneys through the ureters to the bladder. The bladder holds urine until you re ready to release it. The urethra carries urine from the bladder out of the body. The main sphincter muscle circles the mid-urethra.      Front view of female urinary tract.     Date Last Reviewed: 1/1/2017 2000-2017 [employer]. [address] [place] [address], [place] [address]. All rights reserved. This information is not intended as a substitute for professional medical care. Always follow your healthcare professional's instructions.            ANDREW Bobo [facility]

## 2017-09-13 NOTE — PATIENT INSTRUCTIONS
1. Take antibiotics twice a day for 1 week  2. Start taking Cranberry supplement  3. Increase drinking fluid intake      Anatomy of the Female Urinary Tract  Your urinary tract helps get rid of urine (your body s liquid waste). The kidneys collect chemicals and water your body doesn t need. This is turned into urine. Urine travels out of the kidneys through the ureters to the bladder. The bladder holds urine until you re ready to release it. The urethra carries urine from the bladder out of the body. The main sphincter muscle circles the mid-urethra.      Front view of female urinary tract.     Date Last Reviewed: 1/1/2017 2000-2017 [employer]. [address] [place] [address], [place] [address]. All rights reserved. This information is not intended as a substitute for professional medical care. Always follow your healthcare professional's instructions.

## 2017-09-14 LAB
BACTERIA SPEC CULT: NO GROWTH
Lab: NORMAL
SPECIMEN SOURCE: NORMAL

## 2017-11-01 ENCOUNTER — OFFICE VISIT (OUTPATIENT)
Dept: FAMILY MEDICINE | Facility: CLINIC | Age: 42
End: 2017-11-01
Payer: COMMERCIAL

## 2017-11-01 VITALS
WEIGHT: 172.6 LBS | HEIGHT: 70 IN | OXYGEN SATURATION: 98 % | DIASTOLIC BLOOD PRESSURE: 82 MMHG | HEART RATE: 80 BPM | SYSTOLIC BLOOD PRESSURE: 126 MMHG | TEMPERATURE: 98.2 F | BODY MASS INDEX: 24.71 KG/M2

## 2017-11-01 DIAGNOSIS — M77.12 LATERAL EPICONDYLITIS OF LEFT ELBOW: ICD-10-CM

## 2017-11-01 DIAGNOSIS — R82.90 NONSPECIFIC FINDING ON EXAMINATION OF URINE: ICD-10-CM

## 2017-11-01 DIAGNOSIS — N30.01 ACUTE CYSTITIS WITH HEMATURIA: Primary | ICD-10-CM

## 2017-11-01 DIAGNOSIS — R30.0 DYSURIA: ICD-10-CM

## 2017-11-01 LAB
ALBUMIN UR-MCNC: NEGATIVE MG/DL
APPEARANCE UR: ABNORMAL
BILIRUB UR QL STRIP: NEGATIVE
COLOR UR AUTO: YELLOW
GLUCOSE UR STRIP-MCNC: NEGATIVE MG/DL
HGB UR QL STRIP: ABNORMAL
KETONES UR STRIP-MCNC: NEGATIVE MG/DL
LEUKOCYTE ESTERASE UR QL STRIP: ABNORMAL
NITRATE UR QL: NEGATIVE
PH UR STRIP: 6 PH (ref 5–7)
RBC #/AREA URNS AUTO: ABNORMAL /HPF
SOURCE: ABNORMAL
SP GR UR STRIP: 1.02 (ref 1–1.03)
UROBILINOGEN UR STRIP-ACNC: 0.2 EU/DL (ref 0.2–1)
WBC #/AREA URNS AUTO: ABNORMAL /HPF

## 2017-11-01 PROCEDURE — 99213 OFFICE O/P EST LOW 20 MIN: CPT | Performed by: INTERNAL MEDICINE

## 2017-11-01 PROCEDURE — 87086 URINE CULTURE/COLONY COUNT: CPT | Performed by: INTERNAL MEDICINE

## 2017-11-01 PROCEDURE — 81001 URINALYSIS AUTO W/SCOPE: CPT | Performed by: INTERNAL MEDICINE

## 2017-11-01 RX ORDER — SULFAMETHOXAZOLE/TRIMETHOPRIM 800-160 MG
1 TABLET ORAL 2 TIMES DAILY
Qty: 10 TABLET | Refills: 0 | Status: SHIPPED | OUTPATIENT
Start: 2017-11-01 | End: 2017-11-06

## 2017-11-01 NOTE — PROGRESS NOTES
SUBJECTIVE:   Rosalie Sauceda is a 42 year old female who presents to clinic today for the following health issues:  Chief Complaint   Patient presents with     UTI     x 3 days, urgency. Episode of blood in urine today     URINARY TRACT SYMPTOMS  Onset: x 3 days     Description:   Painful urination (Dysuria): YES  Blood in urine (Hematuria): YES- blood in urine started this morning  Delay in urine (Hesitency): no, but does not seem to void all the way.  If sits will find she goes a little bit more     Intensity: mild    Progression of Symptoms:  worsening    Accompanying Signs & Symptoms:  Fever/chills: YES- no fever, but chills  Flank pain no   Nausea and vomiting: no   Any vaginal symptoms: vaginal discharge one episode   Abdominal/Pelvic Pain: no     History:   History of frequent UTI's: YES- about 4 UTIs in past couple of months   History of kidney stones: YES- one time but years ago   Sexually Active: YES  Possibility of pregnancy: No    Precipitating factors:   none    Therapies Tried and outcome: OLD prescription of nitrofurantoin and Cranberry pills (contraindicated in Coumadin patients), trying to drink a little more water, drinks soda all day.       Rosalie has been treated 3 times in the past couple months for UTI- twice on virtual visits and once in clinic.  Sounds like she was treated with nitrofurantoin all 3 times.  Urine culture was run in the clinic visit last month, but no bacteria grew.  Symptoms do resolve in between episodes.      She was seen for left lateral epicondylitis in August and this pain continues despite conservative measures, so she'd like to follow-up with sports med.     Problem list and histories reviewed & adjusted, as indicated.  Additional history: as documented    Patient Active Problem List   Diagnosis     Varicose veins of lower extremities with complications     TMJ (temporomandibular joint syndrome)     Presence of intrauterine contraceptive device (IUD)     Encounter  for tobacco use cessation counseling     Past Surgical History:   Procedure Laterality Date     C/SECTION, LOW TRANSVERSE  6/2006     LIGATN/STRIP LONG & SHORT SAPHEN  7/2007       Social History   Substance Use Topics     Smoking status: Current Every Day Smoker     Packs/day: 1.00     Years: 12.00     Types: Cigars, Cigarettes     Smokeless tobacco: Former User     Quit date: 2/10/2017      Comment: not ready yet 11/12/15     Alcohol use Yes      Comment: 10 drinks per month     Family History   Problem Relation Age of Onset     DIABETES Maternal Grandmother      Breast Cancer Maternal Grandmother      Dx in her 70's     Hypertension Maternal Grandmother      CANCER Maternal Grandfather      LUNG     Prostate Cancer Maternal Grandfather      CANCER Father      skin ca         Current Outpatient Prescriptions   Medication Sig Dispense Refill     sulfamethoxazole-trimethoprim (BACTRIM DS/SEPTRA DS) 800-160 MG per tablet Take 1 tablet by mouth 2 times daily for 5 days 10 tablet 0     nitroFURantoin, macrocrystal-monohydrate, (MACROBID) 100 MG capsule Take 1 capsule (100 mg) by mouth 2 times daily 14 capsule 0     levonorgestrel (MIRENA) 20 MCG/24HR IUD 1 each (20 mcg) by Intrauterine route once for 1 dose 1 each 0     nicotine polacrilex (NICOTINE MINI) 4 MG lozenge Place 1 lozenge (4 mg) inside cheek as needed for smoking cessation (Patient not taking: Reported on 8/3/2017) 48 tablet 1     fluticasone (FLONASE) 50 MCG/ACT nasal spray Spray 1-2 sprays into both nostrils daily (Patient not taking: Reported on 8/3/2017) 1 Bottle 11     Allergies   Allergen Reactions     Amoxicillin      Penicillins          Reviewed and updated as needed this visit by clinical staff     Reviewed and updated as needed this visit by Provider         ROS:  Constitutional and gu systems are negative, except as otherwise noted.      OBJECTIVE:   /82 (BP Location: Left arm, Patient Position: Chair, Cuff Size: Adult Regular)  Pulse 80  " Temp 98.2  F (36.8  C) (Tympanic)  Ht 5' 9.5\" (1.765 m)  Wt 172 lb 9.6 oz (78.3 kg)  SpO2 98%  BMI 25.12 kg/m2  Body mass index is 25.12 kg/(m^2).    GENERAL: healthy, alert and no distress  RESP: lungs clear to auscultation - no rales, rhonchi or wheezes  CV: regular rate and rhythm, normal S1 S2, no S3 or S4, no murmur, click or rub  ABDOMEN: soft, tender over the bladder, no hepatosplenomegaly, no masses and bowel sounds normal  BACK: no CVA tenderness      Diagnostic Test Results:  Results for orders placed or performed in visit on 11/01/17 (from the past 24 hour(s))   *UA reflex to Microscopic and Culture ([place] [facility] (except [place] and [place])   Result Value Ref Range    Color Urine Yellow     Appearance Urine Turbid     Glucose Urine Negative NEG^Negative mg/dL    Bilirubin Urine Negative NEG^Negative    Ketones Urine Negative NEG^Negative mg/dL    Specific Gravity Urine 1.020 1.003 - 1.035    Blood Urine Moderate (A) NEG^Negative    pH Urine 6.0 5.0 - 7.0 pH    Protein Albumin Urine Negative NEG^Negative mg/dL    Urobilinogen Urine 0.2 0.2 - 1.0 EU/dL    Nitrite Urine Negative NEG^Negative    Leukocyte Esterase Urine Large (A) NEG^Negative    Source Midstream Urine    Urine Microscopic   Result Value Ref Range    WBC Urine  (A) OTO2^O - 2 /HPF    RBC Urine 10-25 (A) OTO2^O - 2 /HPF       ASSESSMENT/PLAN:       1. Acute cystitis with hematuria  2. Dysuria  3. Nonspecific finding on examination of urine    U/A is again suggestive of UTI which is consistent with her symptoms.  This is her 4th episode in a couple of months.  First two were treated virtually, 3rd time her culture grew no bacteria.  She was treated with nitrofurantoin all three times, so will try Bactrim this time, and do a slightly longer course since this is recurrent.  Hopefully her culture will grow something this time, though she has taken a couple of doses of nitrofurantoin, which may interfere.  If culture " is again negative and symptoms continue, consider renal and bladder ultrasound for further investigation since she is having a lot of blood in the urine.      - sulfamethoxazole-trimethoprim (BACTRIM DS/SEPTRA DS) 800-160 MG per tablet; Take 1 tablet by mouth 2 times daily for 5 days  Dispense: 10 tablet; Refill: 0  - *UA reflex to Microscopic and Culture ([place] and [facility] (except [place] and [place])  - Urine Microscopic  - Urine Culture Aerobic Bacterial    4. Lateral epicondylitis of left elbow    She was seen for left lateral epicondylitis in August and this pain continues despite conservative measures, so she'd like to follow-up with sports med.     - ORTHO  REFERRAL      Jordan Goldman MD  [facility]

## 2017-11-01 NOTE — NURSING NOTE
"Chief Complaint   Patient presents with     UTI     x 3 days, urgency. Episode of blood in urine today       Initial /82 (BP Location: Left arm, Patient Position: Chair, Cuff Size: Adult Regular)  Pulse 80  Temp 98.2  F (36.8  C) (Tympanic)  Ht 5' 9.5\" (1.765 m)  Wt 172 lb 9.6 oz (78.3 kg)  SpO2 98%  BMI 25.12 kg/m2 Estimated body mass index is 25.12 kg/(m^2) as calculated from the following:    Height as of this encounter: 5' 9.5\" (1.765 m).    Weight as of this encounter: 172 lb 9.6 oz (78.3 kg).  Medication Reconciliation: complete   Landy BAEZ CMA ([facility])    "

## 2017-11-02 LAB
BACTERIA SPEC CULT: NO GROWTH
Lab: NORMAL
SPECIMEN SOURCE: NORMAL

## 2017-11-09 ENCOUNTER — HOSPITAL ENCOUNTER (OUTPATIENT)
Dept: ULTRASOUND IMAGING | Facility: CLINIC | Age: 42
Discharge: HOME | End: 2017-11-09
Attending: INTERNAL MEDICINE | Admitting: INTERNAL MEDICINE
Payer: COMMERCIAL

## 2017-11-09 DIAGNOSIS — N30.01 ACUTE CYSTITIS WITH HEMATURIA: ICD-10-CM

## 2017-11-09 PROCEDURE — 76770 US EXAM ABDO BACK WALL COMP: CPT

## 2017-11-14 ENCOUNTER — OFFICE VISIT (OUTPATIENT)
Dept: ORTHOPEDICS | Facility: CLINIC | Age: 42
End: 2017-11-14
Payer: COMMERCIAL

## 2017-11-14 VITALS
DIASTOLIC BLOOD PRESSURE: 80 MMHG | HEIGHT: 70 IN | BODY MASS INDEX: 24.62 KG/M2 | SYSTOLIC BLOOD PRESSURE: 121 MMHG | WEIGHT: 172 LBS

## 2017-11-14 DIAGNOSIS — M77.12 LEFT LATERAL EPICONDYLITIS: Primary | ICD-10-CM

## 2017-11-14 DIAGNOSIS — M25.522 LEFT ELBOW PAIN: ICD-10-CM

## 2017-11-14 PROCEDURE — 99243 OFF/OP CNSLTJ NEW/EST LOW 30: CPT | Performed by: FAMILY MEDICINE

## 2017-11-14 NOTE — LETTER
"    2017         RE: Rosalie Sauceda  [address] [facility] [id number]        Dear Colleague,    Thank you for referring your patient, Rosalie Sauceda, to the [facility]. Please see a copy of my visit note below.    Rosalie Sauceda  :  1975  DOS: 2017  MRN: [id number]    Sports Medicine Clinic Visit    PCP: Clinic, [facility]    Rosalie Sauceda is a 42 year old Right hand dominant female who is seen in consultation at the request of  Jordan Goldman M.D. presenting with chronic left elbow pain.    Injury: Gradual onset of left elbow pain over the last 5 - 6 months.  Pain located over left lateral, anterior elbow, nonradiating.  Additional Features:  Positive: swelling and weakness.  Symptoms are better with Rest.  Symptoms are worse with: cutting hair, gripping/grasping, pulling.  Other evaluation and/or treatments so far consists of: Ice, Ibuprofen, Rest and counter-force strap, massage therapy, acupuncture, chiropractic, PCP consult.  Recent imaging completed: No recent imaging completed.  Prior History of related problems: H/o similar pain > 10 years ago.    Social History: works as     Review of Systems  Musculoskeletal: as above  Remainder of review of systems is negative including constitutional, CV, pulmonary, GI, Skin and Neurologic except as noted in HPI or medical history.    Past Medical History:   Diagnosis Date     Lumbago     back injury in the past     Past Surgical History:   Procedure Laterality Date     C/SECTION, LOW TRANSVERSE  2006     LIGATN/STRIP LONG & SHORT SAPHEN  2007       Objective  /80  Ht 5' 9.5\" (1.765 m)  Wt 172 lb (78 kg)  BMI 25.04 kg/m2    General: healthy, alert and in no distress    HEENT: no scleral icterus or conjunctival erythema   Skin: no suspicious lesions or rash. No jaundice.   CV: regular rhythm by palpation, 2+ distal pulses, no pedal edema    Resp: normal respiratory effort " without conversational dyspnea   Psych: normal mood and affect    Gait: nonantalgic, appropriate coordination and balance   Neuro: normal light touch sensory exam of the extremities. Motor strength as noted below       Left Elbow exam  Inspection: no swelling  Tender: lateral epicondyle and common extensor tendon  Non-tender: medial epicondyle, common flexor tendon, flexor muscle of forearm, supracondylar notch, olecranon bursa, distal bicep tendon and radial head/neck  Range of Motion: all normal  Strength: elbow strength full and pain with resisted wrist extension and supination  Special tests: normal stability, normal valgus stress, normal varus stress, ulnar Tinel's negative, no pain with resisted middle finger extension, no pain with resisted wrist flexion:       Radiology:  No imaging needed today    Assessment:  1. Left lateral epicondylitis    2. Left elbow pain        Plan:  Discussed the assessment with the patient.  Follow up: 4-6 weeks prn  Wrist brace provided, full-time as possible weaned to nighttime use reviewed  Can use counterforce strap prn  RICE and conservative care reviewed  Modified activity reviewed  Consider CSI for labile sx, but discussed limitations of injection and risks given location, usually only offered once  Home handouts provided and supportive care reviewed  Rest and activity modification are key to improvement  RICE reviewed  All questions were answered today  Contact us with additional questions or concerns  Signs and sx of concern reviewed    Thanks very much for sending this nice lady to us, I will keep you updated with her progress      Xavi Carson DO, CAQ  Primary Care Sports Medicine  [facility]         Disclaimer: This note consists of symbols derived from keyboarding, dictation and/or voice recognition software. As a result, there may be errors in the script that have gone undetected. Please consider this when interpreting information found in  this chart.    Again, thank you for allowing me to participate in the care of your patient.        Sincerely,        Xavi Carson, DO

## 2017-11-14 NOTE — PROGRESS NOTES
"Rosalie Sauceda  :  1975  DOS: 2017  MRN: [id number]    Sports Medicine Clinic Visit    PCP: Clinic, [facility]    Rosalie Sauceda is a 42 year old Right hand dominant female who is seen in consultation at the request of  Jordan Goldman M.D. presenting with chronic left elbow pain.    Injury: Gradual onset of left elbow pain over the last 5 - 6 months.  Pain located over left lateral, anterior elbow, nonradiating.  Additional Features:  Positive: swelling and weakness.  Symptoms are better with Rest.  Symptoms are worse with: cutting hair, gripping/grasping, pulling.  Other evaluation and/or treatments so far consists of: Ice, Ibuprofen, Rest and counter-force strap, massage therapy, acupuncture, chiropractic, PCP consult.  Recent imaging completed: No recent imaging completed.  Prior History of related problems: H/o similar pain > 10 years ago.    Social History: works as     Review of Systems  Musculoskeletal: as above  Remainder of review of systems is negative including constitutional, CV, pulmonary, GI, Skin and Neurologic except as noted in HPI or medical history.    Past Medical History:   Diagnosis Date     Lumbago     back injury in the past     Past Surgical History:   Procedure Laterality Date     C/SECTION, LOW TRANSVERSE  2006     LIGATN/STRIP LONG & SHORT SAPHEN  2007       Objective  /80  Ht 5' 9.5\" (1.765 m)  Wt 172 lb (78 kg)  BMI 25.04 kg/m2    General: healthy, alert and in no distress    HEENT: no scleral icterus or conjunctival erythema   Skin: no suspicious lesions or rash. No jaundice.   CV: regular rhythm by palpation, 2+ distal pulses, no pedal edema    Resp: normal respiratory effort without conversational dyspnea   Psych: normal mood and affect    Gait: nonantalgic, appropriate coordination and balance   Neuro: normal light touch sensory exam of the extremities. Motor strength as noted below       Left Elbow exam  Inspection: no swelling  Tender: " lateral epicondyle and common extensor tendon  Non-tender: medial epicondyle, common flexor tendon, flexor muscle of forearm, supracondylar notch, olecranon bursa, distal bicep tendon and radial head/neck  Range of Motion: all normal  Strength: elbow strength full and pain with resisted wrist extension and supination  Special tests: normal stability, normal valgus stress, normal varus stress, ulnar Tinel's negative, no pain with resisted middle finger extension, no pain with resisted wrist flexion:       Radiology:  No imaging needed today    Assessment:  1. Left lateral epicondylitis    2. Left elbow pain        Plan:  Discussed the assessment with the patient.  Follow up: 4-6 weeks prn  Wrist brace provided, full-time as possible weaned to nighttime use reviewed  Can use counterforce strap prn  RICE and conservative care reviewed  Modified activity reviewed  Consider CSI for labile sx, but discussed limitations of injection and risks given location, usually only offered once  Home handouts provided and supportive care reviewed  Rest and activity modification are key to improvement  RICE reviewed  All questions were answered today  Contact us with additional questions or concerns  Signs and sx of concern reviewed    Thanks very much for sending this nice lady to us, I will keep you updated with her progress      Xavi Carson DO, CABEREKET  Primary Care Sports Medicine  [facility]         Disclaimer: This note consists of symbols derived from keyboarding, dictation and/or voice recognition software. As a result, there may be errors in the script that have gone undetected. Please consider this when interpreting information found in this chart.

## 2017-11-14 NOTE — NURSING NOTE
"Chief Complaint   Patient presents with     Musculoskeletal Problem     left elbow pain > 5 months       Initial /80  Ht 5' 9.5\" (1.765 m)  Wt 172 lb (78 kg)  BMI 25.04 kg/m2 Estimated body mass index is 25.04 kg/(m^2) as calculated from the following:    Height as of this encounter: 5' 9.5\" (1.765 m).    Weight as of this encounter: 172 lb (78 kg).  Medication Reconciliation: complete     Duran Chadwick, ATC  "

## 2017-11-22 ENCOUNTER — OFFICE VISIT (OUTPATIENT)
Dept: FAMILY MEDICINE | Facility: CLINIC | Age: 42
End: 2017-11-22
Payer: COMMERCIAL

## 2017-11-22 VITALS
HEART RATE: 87 BPM | BODY MASS INDEX: 24.92 KG/M2 | TEMPERATURE: 96.6 F | WEIGHT: 174.1 LBS | HEIGHT: 70 IN | SYSTOLIC BLOOD PRESSURE: 124 MMHG | DIASTOLIC BLOOD PRESSURE: 82 MMHG

## 2017-11-22 DIAGNOSIS — N39.0 RECURRENT UTI (URINARY TRACT INFECTION): Primary | ICD-10-CM

## 2017-11-22 DIAGNOSIS — R30.0 DYSURIA: ICD-10-CM

## 2017-11-22 DIAGNOSIS — R82.90 NONSPECIFIC FINDING ON EXAMINATION OF URINE: ICD-10-CM

## 2017-11-22 LAB
ALBUMIN UR-MCNC: 100 MG/DL
APPEARANCE UR: ABNORMAL
BILIRUB UR QL STRIP: ABNORMAL
COLOR UR AUTO: YELLOW
GLUCOSE UR STRIP-MCNC: NEGATIVE MG/DL
HGB UR QL STRIP: ABNORMAL
KETONES UR STRIP-MCNC: NEGATIVE MG/DL
LEUKOCYTE ESTERASE UR QL STRIP: ABNORMAL
NITRATE UR QL: NEGATIVE
PH UR STRIP: 5.5 PH (ref 5–7)
RBC #/AREA URNS AUTO: ABNORMAL /HPF
SOURCE: ABNORMAL
SP GR UR STRIP: >1.03 (ref 1–1.03)
UROBILINOGEN UR STRIP-ACNC: 0.2 EU/DL (ref 0.2–1)
WBC #/AREA URNS AUTO: ABNORMAL /HPF

## 2017-11-22 PROCEDURE — 87086 URINE CULTURE/COLONY COUNT: CPT | Performed by: NURSE PRACTITIONER

## 2017-11-22 PROCEDURE — 81001 URINALYSIS AUTO W/SCOPE: CPT | Performed by: NURSE PRACTITIONER

## 2017-11-22 PROCEDURE — 99213 OFFICE O/P EST LOW 20 MIN: CPT | Performed by: NURSE PRACTITIONER

## 2017-11-22 RX ORDER — SULFAMETHOXAZOLE/TRIMETHOPRIM 800-160 MG
1 TABLET ORAL 2 TIMES DAILY
Qty: 14 TABLET | Refills: 0 | Status: SHIPPED | OUTPATIENT
Start: 2017-11-22 | End: 2017-12-13

## 2017-11-22 NOTE — MR AVS SNAPSHOT
After Visit Summary   11/22/2017    Rosalie Sauceda    MRN: [id number]           Patient Information     Date Of Birth          1975        Visit Information        Provider Department      11/22/2017 8:20 AM Peggy Cai APRN CNP [facility]        Today's Diagnoses     Recurrent UTI (urinary tract infection)    -  1    Dysuria        Nonspecific finding on examination of urine          Care Instructions    urine culture pending  Bactrim 1 tablet twice daily for 7 days  Drink more fluids    Schedule with urologist    Will call you about urine culture results, likely Friday                Follow-ups after your visit        Additional Services     UROLOGY ADULT REFERRAL       Your provider has referred you to: EFRA: [facility] [place] [phone]   [email or web address]    Please be aware that coverage of these services is subject to the terms and limitations of your health insurance plan.  Call member services at your health plan with any benefit or coverage questions.      Please bring the following with you to your appointment:    (1) Any X-Rays, CTs or MRIs which have been performed.  Contact the facility where they were done to arrange for  prior to your scheduled appointment.    (2) List of current medications  (3) This referral request   (4) Any documents/labs given to you for this referral                  Who to contact     If you have questions or need follow up information about today's clinic visit or your schedule please contact [facility] directly at [phone].  Normal or non-critical lab and imaging results will be communicated to you by MyChart, letter or phone within 4 business days after the clinic has received the results. If you do not hear from us within 7 days, please contact the clinic through MyChart or phone. If you have a critical or abnormal lab result, we will notify you by  "phone as soon as possible.  Submit refill requests through [employer] or call your pharmacy and they will forward the refill request to us. Please allow 3 business days for your refill to be completed.          Additional Information About Your Visit        [employer] Information     [employer] gives you secure access to your electronic health record. If you see a primary care provider, you can also send messages to your care team and make appointments. If you have questions, please call your primary care clinic.  If you do not have a primary care provider, please call [phone] and they will assist you.        Care EveryWhere ID     This is your Care EveryWhere ID. This could be used by other organizations to access your [facility] medical records  [id number]        Your Vitals Were     Pulse Temperature Height BMI (Body Mass Index)          87 96.6  F (35.9  C) (Tympanic) 5' 9.5\" (1.765 m) 25.34 kg/m2         Blood Pressure from Last 3 Encounters:   11/22/17 124/82   11/14/17 121/80   11/01/17 126/82    Weight from Last 3 Encounters:   11/22/17 174 lb 1.6 oz (79 kg)   11/14/17 172 lb (78 kg)   11/01/17 172 lb 9.6 oz (78.3 kg)              We Performed the Following     *UA reflex to Microscopic and Culture ([place] and [facility] (except [place] and [place])     Urine Culture Aerobic Bacterial     Urine Microscopic     UROLOGY ADULT REFERRAL          Today's Medication Changes          These changes are accurate as of: 11/22/17  9:19 AM.  If you have any questions, ask your nurse or doctor.               Start taking these medicines.        Dose/Directions    sulfamethoxazole-trimethoprim 800-160 MG per tablet   Commonly known as:  BACTRIM DS/SEPTRA DS   Used for:  Recurrent UTI (urinary tract infection), Dysuria   Started by:  Peggy Cai APRN CNP        Dose:  1 tablet   Take 1 tablet by mouth 2 times daily   Quantity:  14 tablet   Refills:  0            Where to get your medicines      These " medications were sent to [facility] [place], [place] - [address]  [address] [address]     Phone:  [phone]     sulfamethoxazole-trimethoprim 800-160 MG per tablet                Primary Care Provider Office Phone # Fax #    [facility] [phone]       [address] [address]        Equal Access to Services     YASMEEN JOHNSON : Hadii aad ku hadasho Soomaali, waaxda luqadaha, qaybta kaalmada adeegyada, waxay idiin hayaan adeeg kharash laeitan maloney. So [facility] [phone].    ATENCIÓN: Si habla español, tiene a atkins disposición servicios gratuitos de asistencia lingüística. Mary al [phone].    We comply with applicable federal civil rights laws and [place] laws. We do not discriminate on the basis of race, color, national origin, age, disability, sex, sexual orientation, or gender identity.            Thank you!     Thank you for choosing [facility]  for your care. Our goal is always to provide you with excellent care. Hearing back from our patients is one way we can continue to improve our services. Please take a few minutes to complete the written survey that you may receive in the mail after your visit with us. Thank you!             Your Updated Medication List - Protect others around you: Learn how to safely use, store and throw away your medicines at www.disposemymeds.org.          This list is accurate as of: 11/22/17  9:19 AM.  Always use your most recent med list.                   Brand Name Dispense Instructions for use Diagnosis    fluticasone 50 MCG/ACT spray    FLONASE    1 Bottle    Spray 1-2 sprays into both nostrils daily    Otitis media with effusion, bilateral       levonorgestrel 20 MCG/24HR IUD    MIRENA    1 each    1 each (20 mcg) by Intrauterine route once for 1 dose    Encounter for insertion of mirena IUD       nicotine polacrilex 4 MG lozenge    NICOTINE MINI    48 tablet    Place 1 lozenge (4  mg) inside cheek as needed for smoking cessation    Encounter for smoking cessation counseling       sulfamethoxazole-trimethoprim 800-160 MG per tablet    BACTRIM DS/SEPTRA DS    14 tablet    Take 1 tablet by mouth 2 times daily    Recurrent UTI (urinary tract infection), Dysuria

## 2017-11-22 NOTE — PROGRESS NOTES
"  SUBJECTIVE:   Rosalie Sauceda is a 42 year old female who presents to clinic today for the following health issues:  Burning sensation upon urination and also noted some blood in her urine. Complains of suprapubic discomfort, pressure pain. Denies flank pain, nausea, vomiting and fevers. Symptoms started this morning.   She had 5 UTI's this year, since beginning of Summer. She tested positive on UA, but urine culture was always normal. She reports that she took antibiotic before we culture her urine, which might explain negative results. She had renal US on 11/9 and it was normal. Denies vaginal discharge. Using IUD Mirena for contraception. Declines STD screening.     URINARY TRACT SYMPTOMS  Onset: Since this morning     Description:   Painful urination (Dysuria): no   Blood in urine (Hematuria): YES  Delay in urine (Hesitency): no     Intensity: moderate    Progression of Symptoms:  worsening    Accompanying Signs & Symptoms:  Fever/chills: no   Flank pain no   Nausea and vomiting: no   Any vaginal symptoms: none  Abdominal/Pelvic Pain: no     History:   History of frequent UTI's: YES  History of kidney stones: YES  Sexually Active: YES  Possibility of pregnancy: No    Precipitating factors:   None     Therapies Tried and outcome: none     Problem list and histories reviewed & adjusted, as indicated.  Additional history: as documented    Labs reviewed in EPIC    Reviewed and updated as needed this visit by clinical staffTobacco  Allergies  Med Hx  Surg Hx  Fam Hx  Soc Hx      Reviewed and updated as needed this visit by Provider         ROS:  Constitutional, HEENT, cardiovascular, pulmonary, gi and gu systems are negative, except as otherwise noted.      OBJECTIVE:   /82  Pulse 87  Temp 96.6  F (35.9  C) (Tympanic)  Ht 5' 9.5\" (1.765 m)  Wt 174 lb 1.6 oz (79 kg)  BMI 25.34 kg/m2  Body mass index is 25.34 kg/(m^2).  GENERAL: healthy, alert and no distress  ABDOMEN: tenderness suprapubic  BACK: no " CVA tenderness, no paralumbar tenderness  PSYCH: mentation appears normal, affect normal/bright    Diagnostic Test Results:  Urinalysis - positive for UTI  urine culture pending     ASSESSMENT/PLAN:     1. Dysuria  - UA reflex to Microscopic and Culture ([place] and [facility] (except [place] and [place])  - sulfamethoxazole-trimethoprim (BACTRIM DS/SEPTRA DS) 800-160 MG per tablet; Take 1 tablet by mouth 2 times daily  Dispense: 14 tablet; Refill: 0    2. Recurrent UTI (urinary tract infection)    - Urine Microscopic  - sulfamethoxazole-trimethoprim (BACTRIM DS/SEPTRA DS) 800-160 MG per tablet; Take 1 tablet by mouth 2 times daily  Dispense: 14 tablet; Refill: 0  - UROLOGY ADULT REFERRAL    3. Nonspecific finding on examination of urine  - Urine Culture Aerobic Bacterial    See Patient Instructions    ANDREW Moody [facility]

## 2017-11-22 NOTE — NURSING NOTE
"Chief Complaint   Patient presents with     UTI       Initial /82  Pulse 87  Temp 96.6  F (35.9  C) (Tympanic)  Ht 5' 9.5\" (1.765 m)  Wt 174 lb 1.6 oz (79 kg)  BMI 25.34 kg/m2 Estimated body mass index is 25.34 kg/(m^2) as calculated from the following:    Height as of this encounter: 5' 9.5\" (1.765 m).    Weight as of this encounter: 174 lb 1.6 oz (79 kg).  Medication Reconciliation: complete  "

## 2017-11-22 NOTE — PATIENT INSTRUCTIONS
urine culture pending  Bactrim 1 tablet twice daily for 7 days  Drink more fluids    Schedule with urologist    Will call you about urine culture results, likely Friday

## 2017-11-23 LAB
BACTERIA SPEC CULT: NORMAL
Lab: NORMAL
SPECIMEN SOURCE: NORMAL

## 2017-12-13 ENCOUNTER — MYC MEDICAL ADVICE (OUTPATIENT)
Dept: FAMILY MEDICINE | Facility: CLINIC | Age: 42
End: 2017-12-13

## 2017-12-13 DIAGNOSIS — N39.0 RECURRENT UTI (URINARY TRACT INFECTION): ICD-10-CM

## 2017-12-13 DIAGNOSIS — R30.0 DYSURIA: ICD-10-CM

## 2017-12-13 DIAGNOSIS — N39.0 FREQUENT UTI: Primary | ICD-10-CM

## 2017-12-13 RX ORDER — SULFAMETHOXAZOLE/TRIMETHOPRIM 800-160 MG
1 TABLET ORAL 2 TIMES DAILY
Qty: 14 TABLET | Refills: 0 | Status: SHIPPED | OUTPATIENT
Start: 2017-12-13 | End: 2019-03-01

## 2017-12-13 NOTE — TELEPHONE ENCOUNTER
After a short huddle with Peggy, she has agreed to address this request without an appt. I will forward this MyChart question to Peggy. The pt has a Urology appt scheduled for 12/26/17.   Thank you,  Evelin Sanders RN

## 2017-12-26 ENCOUNTER — OFFICE VISIT (OUTPATIENT)
Dept: UROLOGY | Facility: CLINIC | Age: 42
End: 2017-12-26
Payer: COMMERCIAL

## 2017-12-26 VITALS
RESPIRATION RATE: 14 BRPM | HEIGHT: 70 IN | TEMPERATURE: 97.3 F | HEART RATE: 75 BPM | DIASTOLIC BLOOD PRESSURE: 69 MMHG | WEIGHT: 174.16 LBS | SYSTOLIC BLOOD PRESSURE: 112 MMHG | BODY MASS INDEX: 24.93 KG/M2

## 2017-12-26 DIAGNOSIS — N39.0 RECURRENT UTI: Primary | ICD-10-CM

## 2017-12-26 LAB
ALBUMIN UR-MCNC: NEGATIVE MG/DL
APPEARANCE UR: CLEAR
BACTERIA #/AREA URNS HPF: ABNORMAL /HPF
BILIRUB UR QL STRIP: NEGATIVE
COLOR UR AUTO: YELLOW
GLUCOSE UR STRIP-MCNC: NEGATIVE MG/DL
HGB UR QL STRIP: ABNORMAL
KETONES UR STRIP-MCNC: NEGATIVE MG/DL
LEUKOCYTE ESTERASE UR QL STRIP: NEGATIVE
NITRATE UR QL: NEGATIVE
NON-SQ EPI CELLS #/AREA URNS LPF: ABNORMAL /LPF
PH UR STRIP: 5.5 PH (ref 5–7)
RBC #/AREA URNS AUTO: ABNORMAL /HPF
SOURCE: ABNORMAL
SP GR UR STRIP: >1.03 (ref 1–1.03)
UROBILINOGEN UR STRIP-ACNC: 0.2 EU/DL (ref 0.2–1)
WBC #/AREA URNS AUTO: ABNORMAL /HPF

## 2017-12-26 PROCEDURE — 99205 OFFICE O/P NEW HI 60 MIN: CPT | Mod: 25 | Performed by: UROLOGY

## 2017-12-26 PROCEDURE — 81001 URINALYSIS AUTO W/SCOPE: CPT | Performed by: UROLOGY

## 2017-12-26 PROCEDURE — 52000 CYSTOURETHROSCOPY: CPT | Performed by: UROLOGY

## 2017-12-26 NOTE — NURSING NOTE
"Chief Complaint   Patient presents with     Consult     recurrent UTI       Initial /69 (BP Location: Right arm, Patient Position: Chair, Cuff Size: Adult Regular)  Pulse 75  Temp 97.3  F (36.3  C) (Oral)  Resp 14  Ht 1.765 m (5' 9.5\")  Wt 79 kg (174 lb 2.6 oz)  BMI 25.35 kg/m2 Estimated body mass index is 25.35 kg/(m^2) as calculated from the following:    Height as of this encounter: 1.765 m (5' 9.5\").    Weight as of this encounter: 79 kg (174 lb 2.6 oz).  Medication Reconciliation: complete     Muna Go CMA      "

## 2017-12-26 NOTE — PROGRESS NOTES
"Rosalie Sauceda is a 42 year old female seen in consultation for UTI. Consult from Clinic, [facility].      Pt reports many UTI's in the last several months; see below. Primary symptoms for her include pelvic pressure, need to awaken to void at 0600 in the am and \"blood in my urine.\" Does not have those sx's today.    Also reports ROSARIO with exercise; wears light pad for that, otherwise no pad. Dry at nite, no pad.    Today denies dysuria, gross hematuria, frequency; voids q 3 hrs during the day and noc x 1.     Reports \"many\" UTI's recently; see below.    No prior  eval, no bladder surgery, no use of bladder meds.     Hx one vag and one c/s delivery. Denies constipation, fecal incont.    Works as a [employer]; did not complete voiding diary.    Drinks 6 cans of diet Mountain Dew per day, one water. Smokes for 22 years.        Past Medical History:   Diagnosis Date     Lumbago     back injury in the past       Past Surgical History:   Procedure Laterality Date     C/SECTION, LOW TRANSVERSE  6/2006     LIGATN/STRIP LONG & SHORT SAPHEN  7/2007       Social History     Social History     Marital status:      Spouse name: N/A     Number of children: 2     Years of education: N/A     Occupational History      [employer]     Social History Main Topics     Smoking status: Current Every Day Smoker     Packs/day: 1.00     Years: 12.00     Types: Cigars, Cigarettes     Smokeless tobacco: Former User     Quit date: 2/10/2017      Comment: not ready yet 11/12/15     Alcohol use Yes      Comment: 10 drinks per month     Drug use: No     Sexual activity: Yes     Partners: Male     Birth control/ protection: Pill, IUD     Other Topics Concern      Service No     Blood Transfusions No     Caffeine Concern No     Occupational Exposure No     Hobby Hazards No     Sleep Concern No     Stress Concern No     Weight Concern No     Special Diet No     Back Care Yes     Occasional chiropractor     Exercise No     Bike " Helmet No     Seat Belt No     Self-Exams No     Parent/Sibling W/ Cabg, Mi Or Angioplasty Before 65f 55m? No     Social History Narrative       Current Outpatient Prescriptions   Medication Sig Dispense Refill     sulfamethoxazole-trimethoprim (BACTRIM DS/SEPTRA DS) 800-160 MG per tablet Take 1 tablet by mouth 2 times daily 14 tablet 0     levonorgestrel (MIRENA) 20 MCG/24HR IUD 1 each (20 mcg) by Intrauterine route once for 1 dose 1 each 0     nicotine polacrilex (NICOTINE MINI) 4 MG lozenge Place 1 lozenge (4 mg) inside cheek as needed for smoking cessation (Patient not taking: Reported on 8/3/2017) 48 tablet 1     fluticasone (FLONASE) 50 MCG/ACT nasal spray Spray 1-2 sprays into both nostrils daily (Patient not taking: Reported on 8/3/2017) 1 Bottle 11       Physical Exam:    GENL: NAD.    ABD: Soft, non-tender, no masses.    EG: Well-estrogenized, no masses.    VAGINA: Well-estrogenized, no masses.    BN HYPERMOBILITY: Moderate.    CYSTOCELE: Grade 1.    APICAL PROLAPSE: Minimal.    RECTOCELE: Minimal.    BIMANUAL: No mass or tenderness.    Cysto:    (Informed consent obtained. Pause for cause performed)   Sterile prep.    17 Fr scope inserted through urethra. Systematic examination w 70 degree lens.   PVR: 10 cc   MUCOSA: Normal without lesion   ORIFICES: Normal location and morphology   CAPACITY: 500 cc; no pain with filling   Scope withdrawn without untoward effect.    Valsalva:   Moderate hypermobility, moderate leakage noted.    (Pt tolerated procedure without difficulty).      Component      Latest Ref Rng & Units 12/23/2016   Specimen Description       Midstream Urine   Culture Micro       10,000 to 50,000 colonies/mL mixed urogenital ward   Micro Report Status       FINAL 12/25/2016   Special Requests            Component      Latest Ref Rng & Units 9/13/2017   Specimen Description       Midstream Urine   Culture Micro       No growth   Micro Report Status          Special Requests       Specimen  received in preservative     Component      Latest Ref Rng & Units 11/1/2017   Specimen Description       Midstream Urine   Culture Micro       No growth   Micro Report Status          Special Requests       Specimen received in preservative     Component      Latest Ref Rng & Units 11/22/2017   Specimen Description       Midstream Urine   Culture Micro       10,000 to 50,000 colonies/mL . . .   Micro Report Status          Special Requests       Specimen received in preservative         Results for orders placed or performed in visit on 11/22/17   *UA reflex to Microscopic and Culture ([place] and [facility] (except [place] and [place])   Result Value Ref Range    Color Urine Yellow     Appearance Urine Turbid     Glucose Urine Negative NEG^Negative mg/dL    Bilirubin Urine Small (A) NEG^Negative    Ketones Urine Negative NEG^Negative mg/dL    Specific Gravity Urine >1.030 1.003 - 1.035    Blood Urine Large (A) NEG^Negative    pH Urine 5.5 5.0 - 7.0 pH    Protein Albumin Urine 100 (A) NEG^Negative mg/dL    Urobilinogen Urine 0.2 0.2 - 1.0 EU/dL    Nitrite Urine Negative NEG^Negative    Leukocyte Esterase Urine Moderate (A) NEG^Negative    Source Midstream Urine    Urine Microscopic   Result Value Ref Range    WBC Urine  (A) OTO2^O - 2 /HPF    RBC Urine 25-50 (A) OTO2^O - 2 /HPF   Urine Culture Aerobic Bacterial   Result Value Ref Range    Specimen Description Midstream Urine     Special Requests Specimen received in preservative     Culture Micro       10,000 to 50,000 colonies/mL  mixed urogenital ward  Susceptibility testing not routinely done         Today:  Results for orders placed or performed in visit on 12/26/17   UA reflex to Microscopic and Culture [[id number]]   Result Value Ref Range    Color Urine Yellow     Appearance Urine Clear     Glucose Urine Negative NEG^Negative mg/dL    Bilirubin Urine Negative NEG^Negative    Ketones Urine Negative NEG^Negative mg/dL    Specific Gravity Urine  >1.030 1.003 - 1.035    Blood Urine Small (A) NEG^Negative    pH Urine 5.5 5.0 - 7.0 pH    Protein Albumin Urine Negative NEG^Negative mg/dL    Urobilinogen Urine 0.2 0.2 - 1.0 EU/dL    Nitrite Urine Negative NEG^Negative    Leukocyte Esterase Urine Negative NEG^Negative    Source Midstream Urine    Urine Microscopic   Result Value Ref Range    WBC Urine O - 2 OTO2^O - 2 /HPF    RBC Urine O - 2 OTO2^O - 2 /HPF    Squamous Epithelial /LPF Urine Moderate (A) FEW^Few /LPF    Bacteria Urine Few (A) NEG^Negative /HPF         11/9/17: Renal ALYSSA: benign        IMP:  1. No evidence of recent  infectious process by cultures  2. Large soda/tobacco  3. ROSARIO with hypermobility        PLAN:  1. Discussed situation with patient in detail.    2. Suggest urine for culture prior to starting abx moving forward    3. Suggest marked reduction of soda/caffeine/tobacco; pt expresses understanding    4. ALTIS SLING DISCUSSION: We discussed the patients situation in detail including her specific anatomy and conditions. Diagrams are drawn.    We discussed the surgical treatment including Altis pubovaginal sling utilizing mesh material. We addressed the technical aspects of the procedure as well as the potential risks and complications incuding, but not limited to bleeding, infection, damage to organs or other injury. We discussed the recovery process and the potential effect on sexual function in detail. She also understands the alternative forms of therapy (including no therapy and treatment without mesh), and the potential need for additional therapy. We discussed the FDA report on the use of surgical mesh. All questions are answered in detail. Informed consent is obtained. Consent form signed. Handout given.    Pt suggests she will consider her options and let us know if she would like to proceed at some point.    5. 60 minutes spent with patient, more than 50% in counseling and coordination of care for UTI/incont, which did not include  time spent for the procedure.    6. Copy O Ayala

## 2017-12-26 NOTE — MR AVS SNAPSHOT
"              After Visit Summary   12/26/2017    Rosalie Sauceda    MRN: [id number]           Patient Information     Date Of Birth          1975        Visit Information        Provider Department      12/26/2017 9:00 AM Alexander Ureña MD [facility]        Today's Diagnoses     Recurrent UTI    -  1       Follow-ups after your visit        Follow-up notes from your care team     Return if symptoms worsen or fail to improve.      Who to contact     If you have questions or need follow up information about today's clinic visit or your schedule please contact [facility] directly at [phone].  Normal or non-critical lab and imaging results will be communicated to you by MyChart, letter or phone within 4 business days after the clinic has received the results. If you do not hear from us within 7 days, please contact the clinic through [employer] or phone. If you have a critical or abnormal lab result, we will notify you by phone as soon as possible.  Submit refill requests through [employer] or call your pharmacy and they will forward the refill request to us. Please allow 3 business days for your refill to be completed.          Additional Information About Your Visit        MyChart Information     [employer] gives you secure access to your electronic health record. If you see a primary care provider, you can also send messages to your care team and make appointments. If you have questions, please call your primary care clinic.  If you do not have a primary care provider, please call [phone] and they will assist you.        Care EveryWhere ID     This is your Care EveryWhere ID. This could be used by other organizations to access your [facility] medical records  [id number]        Your Vitals Were     Pulse Temperature Respirations Height BMI (Body Mass Index)       75 97.3  F (36.3  C) (Oral) 14 1.765 m (5' 9.5\") 25.35 kg/m2        Blood Pressure from Last 3 Encounters:   12/26/17 " 112/69   11/22/17 124/82   11/14/17 121/80    Weight from Last 3 Encounters:   12/26/17 79 kg (174 lb 2.6 oz)   11/22/17 79 kg (174 lb 1.6 oz)   11/14/17 78 kg (172 lb)              We Performed the Following     CYSTOURETHROSCOPY ([id number])     UA reflex to Microscopic and Culture [[id number]]     Urine Microscopic        Primary Care Provider Office Phone # Fax #    [facility] [phone] [address] [address]        Equal Access to Services     YASMEEN JOHNSON : Hadii donn ku hadasho Soomaali, waaxda luqadaha, qaybta kaalmada adesulyyada, samantha alicea . So [facility] [phone].    ATENCIÓN: Si habla español, tiene a atkins disposición servicios gratuitos de asistencia lingüística. Llame al [phone].    We comply with applicable federal civil rights laws and [place] laws. We do not discriminate on the basis of race, color, national origin, age, disability, sex, sexual orientation, or gender identity.            Thank you!     Thank you for choosing [facility]  for your care. Our goal is always to provide you with excellent care. Hearing back from our patients is one way we can continue to improve our services. Please take a few minutes to complete the written survey that you may receive in the mail after your visit with us. Thank you!             Your Updated Medication List - Protect others around you: Learn how to safely use, store and throw away your medicines at www.disposemymeds.org.          This list is accurate as of: 12/26/17 10:13 AM.  Always use your most recent med list.                   Brand Name Dispense Instructions for use Diagnosis    fluticasone 50 MCG/ACT spray    FLONASE    1 Bottle    Spray 1-2 sprays into both nostrils daily    Otitis media with effusion, bilateral       levonorgestrel 20 MCG/24HR IUD    MIRENA    1 each    1 each (20 mcg) by Intrauterine route once for 1 dose    Encounter for insertion of  mirena IUD       nicotine polacrilex 4 MG lozenge    NICOTINE MINI    48 tablet    Place 1 lozenge (4 mg) inside cheek as needed for smoking cessation    Encounter for smoking cessation counseling       sulfamethoxazole-trimethoprim 800-160 MG per tablet    BACTRIM DS/SEPTRA DS    14 tablet    Take 1 tablet by mouth 2 times daily    Recurrent UTI (urinary tract infection), Dysuria, Frequent UTI

## 2018-03-09 ENCOUNTER — HOSPITAL ENCOUNTER (OUTPATIENT)
Dept: MAMMOGRAPHY | Facility: CLINIC | Age: 43
Discharge: HOME | End: 2018-03-09
Attending: FAMILY MEDICINE | Admitting: FAMILY MEDICINE
Payer: COMMERCIAL

## 2018-03-09 DIAGNOSIS — Z12.31 VISIT FOR SCREENING MAMMOGRAM: ICD-10-CM

## 2018-03-09 PROCEDURE — 77063 BREAST TOMOSYNTHESIS BI: CPT
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
## 2019-03-01 ENCOUNTER — OFFICE VISIT (OUTPATIENT)
Dept: FAMILY MEDICINE | Facility: CLINIC | Age: 44
End: 2019-03-01
Payer: COMMERCIAL

## 2019-03-01 VITALS
OXYGEN SATURATION: 99 % | SYSTOLIC BLOOD PRESSURE: 106 MMHG | TEMPERATURE: 96.7 F | RESPIRATION RATE: 16 BRPM | WEIGHT: 184 LBS | BODY MASS INDEX: 28.88 KG/M2 | DIASTOLIC BLOOD PRESSURE: 86 MMHG | HEIGHT: 67 IN | HEART RATE: 77 BPM

## 2019-03-01 DIAGNOSIS — J35.8 TONSIL STONE: Primary | ICD-10-CM

## 2019-03-01 DIAGNOSIS — S43.412A SPRAIN OF LEFT CORACOHUMERAL LIGAMENT, INITIAL ENCOUNTER: ICD-10-CM

## 2019-03-01 PROCEDURE — 99214 OFFICE O/P EST MOD 30 MIN: CPT | Performed by: NURSE PRACTITIONER

## 2019-03-01 RX ORDER — CYCLOBENZAPRINE HCL 10 MG
10 TABLET ORAL
Qty: 30 TABLET | Refills: 0 | Status: SHIPPED | OUTPATIENT
Start: 2019-03-01 | End: 2021-02-19

## 2019-03-01 RX ORDER — CELECOXIB 100 MG/1
100 CAPSULE ORAL 2 TIMES DAILY PRN
Qty: 30 CAPSULE | Refills: 1 | Status: SHIPPED | OUTPATIENT
Start: 2019-03-01 | End: 2019-08-26

## 2019-03-01 ASSESSMENT — MIFFLIN-ST. JEOR: SCORE: 1519.31

## 2019-03-04 ENCOUNTER — OFFICE VISIT (OUTPATIENT)
Dept: OTOLARYNGOLOGY | Facility: CLINIC | Age: 44
End: 2019-03-04
Payer: COMMERCIAL

## 2019-03-04 VITALS
DIASTOLIC BLOOD PRESSURE: 78 MMHG | BODY MASS INDEX: 29.25 KG/M2 | SYSTOLIC BLOOD PRESSURE: 121 MMHG | TEMPERATURE: 98.5 F | WEIGHT: 184 LBS | HEART RATE: 81 BPM

## 2019-03-04 DIAGNOSIS — R07.0 THROAT PAIN: Primary | ICD-10-CM

## 2019-03-04 PROCEDURE — 99243 OFF/OP CNSLTJ NEW/EST LOW 30: CPT | Performed by: OTOLARYNGOLOGY

## 2019-03-04 ASSESSMENT — PAIN SCALES - GENERAL: PAINLEVEL: NO PAIN (0)

## 2019-03-04 NOTE — LETTER
3/4/2019         RE: Rosalie Sauceda  [address] [facility] [address]        Dear Colleague,    Thank you for referring your patient, Rosalie Sauceda, to the [facility]. Please see a copy of my visit note below.        History of Present Illness - Rosalie Sauceda is a 44 year old female seen in consultation at the request of Peggy Cai for tonsil stones. She had a sore throat following a late night of smoking and gambling, and when she examined her throat she noticed a white spot on the right tonsil. This was subsequently evaluated by her PCP about 2 months later, and she was unable to remove any tonsil stone material from the throat. She reports a sense that something is stuck in the right upper throat, and the sensation is more pronounced when swallowing. She denies voice change, coughing when swallowing, or weight loss. She denies consumption of any fish or other potentially bony foods around the time of onset.    Past Medical History -   Patient Active Problem List   Diagnosis     Varicose veins of lower extremities with complications     TMJ (temporomandibular joint syndrome)     Presence of intrauterine contraceptive device (IUD)     Encounter for tobacco use cessation counseling       Current Medications -   Current Outpatient Medications:      celecoxib (CELEBREX) 100 MG capsule, Take 1 capsule (100 mg) by mouth 2 times daily as needed for moderate pain, Disp: 30 capsule, Rfl: 1     cyclobenzaprine (FLEXERIL) 10 MG tablet, Take 1 tablet (10 mg) by mouth nightly as needed for muscle spasms, Disp: 30 tablet, Rfl: 0     levonorgestrel (MIRENA) 20 MCG/24HR IUD, 1 each (20 mcg) by Intrauterine route once for 1 dose, Disp: 1 each, Rfl: 0    Allergies -   Allergies   Allergen Reactions     Amoxicillin      Penicillins        Social History -   Social History     Socioeconomic History     Marital status:      Spouse name: Not on file     Number of children: 2     Years of  education: Not on file     Highest education level: Not on file   Occupational History     Employer: [employer]   Social Needs     Financial resource strain: Not on file     Food insecurity:     Worry: Not on file     Inability: Not on file     Transportation needs:     Medical: Not on file     Non-medical: Not on file   Tobacco Use     Smoking status: Current Every Day Smoker     Packs/day: 1.00     Years: 12.00     Pack years: 12.00     Types: Cigars, Cigarettes     Smokeless tobacco: Former User     Quit date: 2/10/2017     Tobacco comment: not ready yet 11/12/15   Substance and Sexual Activity     Alcohol use: Yes     Comment: 10 drinks per month     Drug use: No     Sexual activity: Yes     Partners: Male     Birth control/protection: Pill, IUD   Lifestyle     Physical activity:     Days per week: Not on file     Minutes per session: Not on file     Stress: Not on file   Relationships     Social connections:     Talks on phone: Not on file     Gets together: Not on file     Attends Caodaism service: Not on file     Active member of club or organization: Not on file     Attends meetings of clubs or organizations: Not on file     Relationship status: Not on file     Intimate partner violence:     Fear of current or ex partner: Not on file     Emotionally abused: Not on file     Physically abused: Not on file     Forced sexual activity: Not on file   Other Topics Concern      Service No     Blood Transfusions No     Caffeine Concern No     Occupational Exposure No     Hobby Hazards No     Sleep Concern No     Stress Concern No     Weight Concern No     Special Diet No     Back Care Yes     Comment: Occasional chiropractor     Exercise No     Bike Helmet No     Seat Belt No     Self-Exams No     Parent/sibling w/ CABG, MI or angioplasty before 65F 55M? No   Social History Narrative     Not on file       Family History -   Family History   Problem Relation Age of Onset     Diabetes Maternal Grandmother       Breast Cancer Maternal Grandmother         Dx in her 70's     Hypertension Maternal Grandmother      Cancer Maternal Grandfather         LUNG     Prostate Cancer Maternal Grandfather      Cancer Father         skin ca       Review of Systems - As per HPI and PMHx, otherwise 7 system review of the head and neck negative. 10+ system review negative.    Physical Exam  /78 (BP Location: Right arm, Patient Position: Chair, Cuff Size: Adult Regular)   Pulse 81   Temp 98.5  F (36.9  C) (Oral)   Wt 83.5 kg (184 lb)   BMI 29.25 kg/m     General - The patient is well nourished and well developed, and appears to have good nutritional status.  Alert and oriented to person and place, answers questions and cooperates with examination appropriately.   Head and Face - Normocephalic and atraumatic, with no gross asymmetry noted of the contour of the facial features.  The facial nerve is intact, with strong symmetric movements.  Voice and Breathing - The patient was breathing comfortably without the use of accessory muscles. There was no wheezing, stridor, or stertor.  The patients voice was clear and strong, and had appropriate pitch and quality.  Ears - Bilateral pinna and EACs with normal appearing overlying skin. Tympanic membrane intact bilaterally in good position.  Eyes - Extraocular movements intact.  Sclera were not icteric or injected, conjunctiva were pink and moist.  Mouth - Examination of the oral cavity showed pink, healthy oral mucosa. No lesions or ulcerations noted.  The tongue was mobile and midline, and the dentition were in good condition.    Throat - The walls of the oropharynx were smooth, pink, moist, symmetric, and had no lesions or ulcerations.  The tonsillar pillars and soft palate were symmetric.  The uvula was midline on elevation. Right tonsil with a 1mm raised white area that appears to be mucosally covered. Tonsils are 1+ and mostly tucked laterally making visualization difficult.  Neck -  Normal midline excursion of the laryngotracheal complex during swallowing.  Full range of motion on passive movement.  Palpation of the occipital, submental, submandibular, internal jugular chain, and supraclavicular nodes did not demonstrate any abnormal lymph nodes or masses.  The carotid pulse was palpable bilaterally.  Palpation of the thyroid was soft and smooth, with no nodules or goiter appreciated.  The trachea was mobile and midline.  Nose - External contour is symmetric, no gross deflection or scars.  Nasal mucosa is pink and moist with no abnormal mucus.  The septum was midline and non-obstructive, turbinates of normal size and position.  No polyps, masses, or purulence noted on examination.          Assessment - Rosalie Sauceda is a 44 year old female with right throat discomfort. I do not think the white spot on her tonsils represents a tonsil stone, as she has never produced foul smelling material from her tonsils. I suspect this is an area of benign hypertrophy of some tonsillar tissue. Therefore I am unsure why she has some right upper throat discomfort, but exam today was normal, revealing no worrisome findings. She does have TMJ and we discussed how this can also present with upper throat and neck pain. I offered that tonsillectomy could be attempted, but expectations for improvement should be tempered in the absence of clear evidence of tonsil stones. I discussed the nature of the procedure, the expected recovery, and the more serous risks of bleeding following the procedure. She will think about this option and call if she wishes to arrange for surgery.       Dr. Bea Kyle MD  Otolaryngology  [facility]        Again, thank you for allowing me to participate in the care of your patient.        Sincerely,        Bea Kyle MD

## 2019-03-04 NOTE — PROGRESS NOTES
History of Present Illness - Rosalie Sauceda is a 44 year old female seen in consultation at the request of Peggy Cai for tonsil stones. She had a sore throat following a late night of smoking and gambling, and when she examined her throat she noticed a white spot on the right tonsil. This was subsequently evaluated by her PCP about 2 months later, and she was unable to remove any tonsil stone material from the throat. She reports a sense that something is stuck in the right upper throat, and the sensation is more pronounced when swallowing. She denies voice change, coughing when swallowing, or weight loss. She denies consumption of any fish or other potentially bony foods around the time of onset.    Past Medical History -   Patient Active Problem List   Diagnosis     Varicose veins of lower extremities with complications     TMJ (temporomandibular joint syndrome)     Presence of intrauterine contraceptive device (IUD)     Encounter for tobacco use cessation counseling       Current Medications -   Current Outpatient Medications:      celecoxib (CELEBREX) 100 MG capsule, Take 1 capsule (100 mg) by mouth 2 times daily as needed for moderate pain, Disp: 30 capsule, Rfl: 1     cyclobenzaprine (FLEXERIL) 10 MG tablet, Take 1 tablet (10 mg) by mouth nightly as needed for muscle spasms, Disp: 30 tablet, Rfl: 0     levonorgestrel (MIRENA) 20 MCG/24HR IUD, 1 each (20 mcg) by Intrauterine route once for 1 dose, Disp: 1 each, Rfl: 0    Allergies -   Allergies   Allergen Reactions     Amoxicillin      Penicillins        Social History -   Social History     Socioeconomic History     Marital status:      Spouse name: Not on file     Number of children: 2     Years of education: Not on file     Highest education level: Not on file   Occupational History     Employer: [employer]   Social Needs     Financial resource strain: Not on file     Food insecurity:     Worry: Not on file     Inability: Not on file      Transportation needs:     Medical: Not on file     Non-medical: Not on file   Tobacco Use     Smoking status: Current Every Day Smoker     Packs/day: 1.00     Years: 12.00     Pack years: 12.00     Types: Cigars, Cigarettes     Smokeless tobacco: Former User     Quit date: 2/10/2017     Tobacco comment: not ready yet 11/12/15   Substance and Sexual Activity     Alcohol use: Yes     Comment: 10 drinks per month     Drug use: No     Sexual activity: Yes     Partners: Male     Birth control/protection: Pill, IUD   Lifestyle     Physical activity:     Days per week: Not on file     Minutes per session: Not on file     Stress: Not on file   Relationships     Social connections:     Talks on phone: Not on file     Gets together: Not on file     Attends Restorationist service: Not on file     Active member of club or organization: Not on file     Attends meetings of clubs or organizations: Not on file     Relationship status: Not on file     Intimate partner violence:     Fear of current or ex partner: Not on file     Emotionally abused: Not on file     Physically abused: Not on file     Forced sexual activity: Not on file   Other Topics Concern      Service No     Blood Transfusions No     Caffeine Concern No     Occupational Exposure No     Hobby Hazards No     Sleep Concern No     Stress Concern No     Weight Concern No     Special Diet No     Back Care Yes     Comment: Occasional chiropractor     Exercise No     Bike Helmet No     Seat Belt No     Self-Exams No     Parent/sibling w/ CABG, MI or angioplasty before 65F 55M? No   Social History Narrative     Not on file       Family History -   Family History   Problem Relation Age of Onset     Diabetes Maternal Grandmother      Breast Cancer Maternal Grandmother         Dx in her 70's     Hypertension Maternal Grandmother      Cancer Maternal Grandfather         LUNG     Prostate Cancer Maternal Grandfather      Cancer Father         skin ca       Review of Systems -  As per HPI and PMHx, otherwise 7 system review of the head and neck negative. 10+ system review negative.    Physical Exam  /78 (BP Location: Right arm, Patient Position: Chair, Cuff Size: Adult Regular)   Pulse 81   Temp 98.5  F (36.9  C) (Oral)   Wt 83.5 kg (184 lb)   BMI 29.25 kg/m    General - The patient is well nourished and well developed, and appears to have good nutritional status.  Alert and oriented to person and place, answers questions and cooperates with examination appropriately.   Head and Face - Normocephalic and atraumatic, with no gross asymmetry noted of the contour of the facial features.  The facial nerve is intact, with strong symmetric movements.  Voice and Breathing - The patient was breathing comfortably without the use of accessory muscles. There was no wheezing, stridor, or stertor.  The patients voice was clear and strong, and had appropriate pitch and quality.  Ears - Bilateral pinna and EACs with normal appearing overlying skin. Tympanic membrane intact bilaterally in good position.  Eyes - Extraocular movements intact.  Sclera were not icteric or injected, conjunctiva were pink and moist.  Mouth - Examination of the oral cavity showed pink, healthy oral mucosa. No lesions or ulcerations noted.  The tongue was mobile and midline, and the dentition were in good condition.    Throat - The walls of the oropharynx were smooth, pink, moist, symmetric, and had no lesions or ulcerations.  The tonsillar pillars and soft palate were symmetric.  The uvula was midline on elevation. Right tonsil with a 1mm raised white area that appears to be mucosally covered. Tonsils are 1+ and mostly tucked laterally making visualization difficult.  Neck - Normal midline excursion of the laryngotracheal complex during swallowing.  Full range of motion on passive movement.  Palpation of the occipital, submental, submandibular, internal jugular chain, and supraclavicular nodes did not demonstrate any  abnormal lymph nodes or masses.  The carotid pulse was palpable bilaterally.  Palpation of the thyroid was soft and smooth, with no nodules or goiter appreciated.  The trachea was mobile and midline.  Nose - External contour is symmetric, no gross deflection or scars.  Nasal mucosa is pink and moist with no abnormal mucus.  The septum was midline and non-obstructive, turbinates of normal size and position.  No polyps, masses, or purulence noted on examination.          Assessment - Rosalie Sauceda is a 44 year old female with right throat discomfort. I do not think the white spot on her tonsils represents a tonsil stone, as she has never produced foul smelling material from her tonsils. I suspect this is an area of benign hypertrophy of some tonsillar tissue. Therefore I am unsure why she has some right upper throat discomfort, but exam today was normal, revealing no worrisome findings. She does have TMJ and we discussed how this can also present with upper throat and neck pain. I offered that tonsillectomy could be attempted, but expectations for improvement should be tempered in the absence of clear evidence of tonsil stones. I discussed the nature of the procedure, the expected recovery, and the more serous risks of bleeding following the procedure. She will think about this option and call if she wishes to arrange for surgery.       Dr. Bea Kyle MD  Otolaryngology  [facility]

## 2019-03-04 NOTE — NURSING NOTE
"Initial /78 (BP Location: Right arm, Patient Position: Chair, Cuff Size: Adult Regular)   Pulse 81   Temp 98.5  F (36.9  C) (Oral)   Wt 83.5 kg (184 lb)   BMI 29.25 kg/m   Estimated body mass index is 29.25 kg/m  as calculated from the following:    Height as of 3/1/19: 1.689 m (5' 6.5\").    Weight as of this encounter: 83.5 kg (184 lb). .    Nasreen Lerner LPN    "

## 2019-05-15 ENCOUNTER — HOSPITAL ENCOUNTER (OUTPATIENT)
Dept: MAMMOGRAPHY | Facility: CLINIC | Age: 44
Discharge: HOME | End: 2019-05-15
Attending: FAMILY MEDICINE | Admitting: FAMILY MEDICINE
Payer: COMMERCIAL

## 2019-05-15 DIAGNOSIS — Z12.31 VISIT FOR SCREENING MAMMOGRAM: ICD-10-CM

## 2019-05-15 PROCEDURE — 77063 BREAST TOMOSYNTHESIS BI: CPT

## 2019-08-26 ENCOUNTER — OFFICE VISIT (OUTPATIENT)
Dept: FAMILY MEDICINE | Facility: CLINIC | Age: 44
End: 2019-08-26
Payer: COMMERCIAL

## 2019-08-26 ENCOUNTER — ANCILLARY PROCEDURE (OUTPATIENT)
Dept: GENERAL RADIOLOGY | Facility: CLINIC | Age: 44
End: 2019-08-26
Attending: NURSE PRACTITIONER
Payer: COMMERCIAL

## 2019-08-26 VITALS
RESPIRATION RATE: 18 BRPM | HEIGHT: 67 IN | DIASTOLIC BLOOD PRESSURE: 82 MMHG | HEART RATE: 79 BPM | BODY MASS INDEX: 29.6 KG/M2 | OXYGEN SATURATION: 97 % | TEMPERATURE: 98.5 F | SYSTOLIC BLOOD PRESSURE: 122 MMHG | WEIGHT: 188.6 LBS

## 2019-08-26 DIAGNOSIS — D22.9 BENIGN MOLE: ICD-10-CM

## 2019-08-26 DIAGNOSIS — M79.671 RIGHT FOOT PAIN: ICD-10-CM

## 2019-08-26 DIAGNOSIS — M72.2 PLANTAR FASCIITIS: Primary | ICD-10-CM

## 2019-08-26 PROCEDURE — 99214 OFFICE O/P EST MOD 30 MIN: CPT | Performed by: NURSE PRACTITIONER

## 2019-08-26 PROCEDURE — 73630 X-RAY EXAM OF FOOT: CPT | Mod: RT

## 2019-08-26 RX ORDER — CELECOXIB 100 MG/1
100 CAPSULE ORAL 2 TIMES DAILY PRN
Qty: 30 CAPSULE | Refills: 1 | Status: SHIPPED | OUTPATIENT
Start: 2019-08-26 | End: 2021-02-19

## 2019-08-26 ASSESSMENT — MIFFLIN-ST. JEOR: SCORE: 1540.17

## 2019-08-26 NOTE — PATIENT INSTRUCTIONS
Celebrex 100 mg twice daily as needed for pain  Lidocaine cream twice daily as needed (avaialble over the counter)  Stretching exercises     Xray    Monitor mole on your check, is is very small and look benign

## 2019-08-26 NOTE — PROGRESS NOTES
"Subjective     Rosalie Sauceda is a 44 year old female who presents to clinic today for the following health issues:    HPI   Musculoskeletal problem/pain    Chief Complaint   Patient presents with     Derm Problem     Throat Problem     Musculoskeletal Problem       Duration: one month     Description  Location: ball of right foot, feels like the bone is rubbing together, can hear a clicking noise when she walks     Intensity:  Mild, foot does get sore     Accompanying signs and symptoms: none    History  Previous similar problem: no   Previous evaluation:  none    Precipitating or alleviating factors:  Trauma or overuse: YES- over use, is a    Aggravating factors include: overuse    Therapies tried and outcome: nothing    DERM PROBLEM: Mole on her right breast, has had this for awhile. 3 weeks ago she noticed that the mole looks like there is a scab on it, is now lighter color.     THROAT PROBLEM: Feels like there is something on the back of her throat.     Reviewed and updated as needed this visit by Provider  Tobacco  Allergies  Meds  Problems  Med Hx  Surg Hx  Fam Hx         Review of Systems   ROS COMP: Constitutional, HEENT, cardiovascular, pulmonary, gi and gu systems are negative, except as otherwise noted.      Objective    /82 (BP Location: Left arm, Patient Position: Sitting, Cuff Size: Adult Regular)   Pulse 79   Temp 98.5  F (36.9  C) (Tympanic)   Resp 18   Ht 1.689 m (5' 6.5\")   Wt 85.5 kg (188 lb 9.6 oz)   SpO2 97%   BMI 29.98 kg/m    Body mass index is 29.98 kg/m .  Physical Exam   GENERAL: healthy, alert and no distress  EYES: Eyes grossly normal to inspection, PERRL and conjunctivae and sclerae normal  HENT: ear canals and TM's normal, nose and mouth without ulcers or lesions  NECK: no adenopathy, no asymmetry, masses, or scars and thyroid normal to palpation  MS: no gross musculoskeletal defects noted, no edema  SKIN: no suspicious lesions or rashes, small light " brown color flat mole 5x6 mm, on the left side upper chest area    NEURO: Normal strength and tone, mentation intact and speech normal  PSYCH: mentation appears normal, affect normal/bright    Diagnostic Test Results:  Labs reviewed in Epic        Assessment & Plan     1. Plantar fasciitis    - celecoxib (CELEBREX) 100 MG capsule; Take 1 capsule (100 mg) by mouth 2 times daily as needed for moderate pain  Dispense: 30 capsule; Refill: 1    2. Right foot pain  -normal Xray  - XR Foot Right G/E 3 Views; Future  - celecoxib (CELEBREX) 100 MG capsule; Take 1 capsule (100 mg) by mouth 2 times daily as needed for moderate pain  Dispense: 30 capsule; Refill: 1    3. Benign mole  -small 5 by 6 mm flat light brown color mole on the chest, recommended monitoring        See Patient Instructions    Return in about 1 year (around 8/26/2020) for Routine Visit.    ANDREW Moody [facility]

## 2019-11-03 ENCOUNTER — HEALTH MAINTENANCE LETTER (OUTPATIENT)
Age: 44
End: 2019-11-03

## 2020-05-10 ENCOUNTER — E-VISIT (OUTPATIENT)
Dept: FAMILY MEDICINE | Facility: CLINIC | Age: 45
End: 2020-05-10
Payer: COMMERCIAL

## 2020-05-10 DIAGNOSIS — R30.0 DYSURIA: Primary | ICD-10-CM

## 2020-05-10 PROCEDURE — 99421 OL DIG E/M SVC 5-10 MIN: CPT | Performed by: INTERNAL MEDICINE

## 2020-05-11 RX ORDER — NITROFURANTOIN 25; 75 MG/1; MG/1
100 CAPSULE ORAL 2 TIMES DAILY
Qty: 14 CAPSULE | Refills: 0 | Status: SHIPPED | OUTPATIENT
Start: 2020-05-11 | End: 2021-02-19

## 2020-05-11 NOTE — PATIENT INSTRUCTIONS
Thank you for choosing us for your care. I have placed an order for a prescription so that you can start treatment. View your full visit summary for details by clicking on the link below. Your pharmacist will able to address any questions you may have about the medication.     If you re not feeling better within 2-3 days, please schedule an appointment.  You can schedule an appointment right here in [facility], or call [phone]  If the visit is for the same symptoms as your e-visit, we ll refund the cost of your e-visit if seen within seven days.

## 2020-11-02 ENCOUNTER — ANCILLARY PROCEDURE (OUTPATIENT)
Dept: MAMMOGRAPHY | Facility: CLINIC | Age: 45
End: 2020-11-02
Attending: FAMILY MEDICINE
Payer: COMMERCIAL

## 2020-11-02 DIAGNOSIS — Z12.31 VISIT FOR SCREENING MAMMOGRAM: ICD-10-CM

## 2020-11-02 PROCEDURE — 77067 SCR MAMMO BI INCL CAD: CPT | Performed by: RADIOLOGY

## 2020-11-02 PROCEDURE — 77063 BREAST TOMOSYNTHESIS BI: CPT | Performed by: RADIOLOGY

## 2020-11-16 ENCOUNTER — HEALTH MAINTENANCE LETTER (OUTPATIENT)
Age: 45
End: 2020-11-16

## 2020-12-04 ENCOUNTER — OFFICE VISIT (OUTPATIENT)
Dept: ORTHOPEDICS | Facility: CLINIC | Age: 45
End: 2020-12-04
Payer: COMMERCIAL

## 2020-12-04 ENCOUNTER — ANCILLARY PROCEDURE (OUTPATIENT)
Dept: GENERAL RADIOLOGY | Facility: CLINIC | Age: 45
End: 2020-12-04
Attending: PEDIATRICS
Payer: COMMERCIAL

## 2020-12-04 VITALS
DIASTOLIC BLOOD PRESSURE: 82 MMHG | WEIGHT: 192 LBS | BODY MASS INDEX: 30.13 KG/M2 | SYSTOLIC BLOOD PRESSURE: 118 MMHG | HEIGHT: 67 IN

## 2020-12-04 DIAGNOSIS — M67.432 GANGLION OF LEFT WRIST: ICD-10-CM

## 2020-12-04 DIAGNOSIS — M25.532 LEFT WRIST PAIN: Primary | ICD-10-CM

## 2020-12-04 DIAGNOSIS — M25.532 LEFT WRIST PAIN: ICD-10-CM

## 2020-12-04 PROCEDURE — 99204 OFFICE O/P NEW MOD 45 MIN: CPT | Performed by: PEDIATRICS

## 2020-12-04 PROCEDURE — 73110 X-RAY EXAM OF WRIST: CPT | Mod: LT | Performed by: RADIOLOGY

## 2020-12-04 RX ORDER — ACETAMINOPHEN 500 MG
500-1000 TABLET ORAL EVERY 6 HOURS PRN
COMMUNITY

## 2020-12-04 ASSESSMENT — MIFFLIN-ST. JEOR: SCORE: 1550.6

## 2020-12-04 NOTE — LETTER
12/4/2020         RE: Rosalie Sauceda  [address] [facility] [address]        Dear Colleague,    Thank you for referring your patient, Rosalie Sauceda, to the [facility] [place]. Please see a copy of my visit note below.    Sports Medicine Clinic Visit    PCP: Clinic, [facility]    Rosalie Sauceda is a 45 year old female who is seen  as a self referral presenting with left wrist pain.    Injury: Patient reports worsening pain for ~ 1 week, however, has had pain on this wrist on and off for years.  Has seen Dr. Carson for lateral epicondylitis and Dr. Brewer for ganglion cyst aspiration in the past.  - Possible injury Labor day this year, getting up from a chair and felt something, has been coming and going since.  - Current pain is ulnar wrist, worse with twisting and rotation    Location of Pain: ulnar wrist  Duration of Pain: 1+ week(s)  Rating of Pain at worst: 7/10  Rating of Pain Currently: 2/10  Symptoms are better with: Tylenol and brace  Symptoms are worse with: rotation, gripping, lifting, washing hair  Additional Features:   Positive: none   Negative: swelling, bruising, popping, grinding, catching, locking, instability, paresthesias, numbness, weakness, pain in other joints and systemic symptoms  Other evaluation and/or treatments so far consists of: nothing for this occurence  Prior History of related problems: ganglion cyst aspiration in the past    Social History: [employer]    Review of Systems  Skin: no bruising, yes cyst swelling  Musculoskeletal: as above  Neurologic: no numbness, paresthesias  Remainder of review of systems is negative including constitutional, CV, pulmonary, GI, except as noted in HPI or medical history.    Patient's current problem list, past medical and surgical history, and family history were reviewed.    Patient Active Problem List   Diagnosis     Varicose veins of lower extremities with complications     TMJ  "(temporomandibular joint syndrome)     Presence of intrauterine contraceptive device (IUD)     Encounter for tobacco use cessation counseling     Past Medical History:   Diagnosis Date     Lumbago     back injury in the past     Past Surgical History:   Procedure Laterality Date     C/SECTION, LOW TRANSVERSE  6/2006     LIGATN/STRIP LONG & SHORT SAPHEN  7/2007     Family History   Problem Relation Age of Onset     Diabetes Maternal Grandmother      Breast Cancer Maternal Grandmother         Dx in her 70's     Hypertension Maternal Grandmother      Cancer Maternal Grandfather         LUNG     Prostate Cancer Maternal Grandfather      Cancer Father         skin ca         Objective  /82   Ht 1.689 m (5' 6.5\")   Wt 87.1 kg (192 lb)   BMI 30.53 kg/m      GENERAL APPEARANCE: healthy, alert and no distress   GAIT: NORMAL  SKIN: no suspicious lesions or rashes  HEENT: Sclera clear, anicteric  CV: good peripheral pulses  RESP: Breathing not labored  NEURO: Normal strength and tone, mentation intact and speech normal  PSYCH:  mentation appears normal and affect normal/bright    Bilateral Wrist and Hand exam    Inspection:       Swelling: dorsal ganglion left wrist    Tender:       None currently - patient points to ulnar wrist    Non Tender:       Remainder of the Wrist and Hand bilateral    ROM:       Full and symmetric active and passive range of motion of the forearm, wrist and digits bilateral with the exception of slightly limited extension on the left    Strength:       5/5 strength in the muscles of the hand, wrist and forearm bilateral    Special Tests:        neg (-) TFCC compression test left    Neurovascular:       2+ radial pulses bilaterally with brisk capillary refill and      normal sensation to light touch in the radial, median and ulnar nerve distributions      Radiology  I ordered, visualized and reviewed these images with the patient  XR WRIST LEFT G/E 3 VIEWS 12/4/2020 10:46 AM   HISTORY: Left " wrist pain                                                         IMPRESSION: Negative exam.    Assessment:  1. Left wrist pain    2. Ganglion of left wrist      We discussed these other possible diagnosis: ganglion cyst, ECU tendinosis, TFCC tear  Will obtain MRI given chronic problems, discussed OT referral for custom bracing.  Would consider cyst procedure, hand surgery referral pending clinical course.    Plan:  - Today's Plan of Care:  MRI of the Left wrist - Call [phone] to schedule MRI  [phone]  - consider custom bracing    -We also discussed other future treatment options:  Ganglion cyst procedure  Referral to hand surgery    Follow Up: In clinic with Dr. Diez after MRI (wait at least 1-2 days)    Concerning signs and symptoms were reviewed.  The patient expressed understanding of this management plan and all questions were answered at this time.    Kailee Diez MD CAQ  Primary Care Sports Medicine  [facility]      Again, thank you for allowing me to participate in the care of your patient.        Sincerely,        Kailee Diez MD

## 2020-12-04 NOTE — PATIENT INSTRUCTIONS
We discussed these other possible diagnosis: ganglion cyst, ECU tendinosis, TFCC tear    Plan:  - Today's Plan of Care:  MRI of the Left wrist - Call [phone] to schedule MRI  [phone]  - consider custom bracing    -We also discussed other future treatment options:  Ganglion cyst procedure  Referral to hand surgery    Follow Up: In clinic with Dr. Diez after MRI (wait at least 1-2 days)    If you have any further questions for your physician or physician s care team you can call [phone] and use option 3 to leave a voice message. Calls received during business hours will be returned same day.

## 2020-12-04 NOTE — RESULT ENCOUNTER NOTE
These results were discussed during office visit.    Kailee Diez MD, CAQ  Primary Care Sports Medicine  [facility]

## 2020-12-04 NOTE — PROGRESS NOTES
Sports Medicine Clinic Visit    PCP: Clinic, [facility]    Rosalie Sauceda is a 45 year old female who is seen  as a self referral presenting with left wrist pain.    Injury: Patient reports worsening pain for ~ 1 week, however, has had pain on this wrist on and off for years.  Has seen Dr. Carson for lateral epicondylitis and Dr. Brewer for ganglion cyst aspiration in the past.  - Possible injury Labor day this year, getting up from a chair and felt something, has been coming and going since.  - Current pain is ulnar wrist, worse with twisting and rotation    Location of Pain: ulnar wrist  Duration of Pain: 1+ week(s)  Rating of Pain at worst: 7/10  Rating of Pain Currently: 2/10  Symptoms are better with: Tylenol and brace  Symptoms are worse with: rotation, gripping, lifting, washing hair  Additional Features:   Positive: none   Negative: swelling, bruising, popping, grinding, catching, locking, instability, paresthesias, numbness, weakness, pain in other joints and systemic symptoms  Other evaluation and/or treatments so far consists of: nothing for this occurence  Prior History of related problems: ganglion cyst aspiration in the past    Social History: [employer]    Review of Systems  Skin: no bruising, yes cyst swelling  Musculoskeletal: as above  Neurologic: no numbness, paresthesias  Remainder of review of systems is negative including constitutional, CV, pulmonary, GI, except as noted in HPI or medical history.    Patient's current problem list, past medical and surgical history, and family history were reviewed.    Patient Active Problem List   Diagnosis     Varicose veins of lower extremities with complications     TMJ (temporomandibular joint syndrome)     Presence of intrauterine contraceptive device (IUD)     Encounter for tobacco use cessation counseling     Past Medical History:   Diagnosis Date     Lumbago     back injury in the past     Past Surgical History:   Procedure Laterality Date  "    C/SECTION, LOW TRANSVERSE  6/2006     LIGATN/STRIP LONG & SHORT SAPHEN  7/2007     Family History   Problem Relation Age of Onset     Diabetes Maternal Grandmother      Breast Cancer Maternal Grandmother         Dx in her 70's     Hypertension Maternal Grandmother      Cancer Maternal Grandfather         LUNG     Prostate Cancer Maternal Grandfather      Cancer Father         skin ca         Objective  /82   Ht 1.689 m (5' 6.5\")   Wt 87.1 kg (192 lb)   BMI 30.53 kg/m      GENERAL APPEARANCE: healthy, alert and no distress   GAIT: NORMAL  SKIN: no suspicious lesions or rashes  HEENT: Sclera clear, anicteric  CV: good peripheral pulses  RESP: Breathing not labored  NEURO: Normal strength and tone, mentation intact and speech normal  PSYCH:  mentation appears normal and affect normal/bright    Bilateral Wrist and Hand exam    Inspection:       Swelling: dorsal ganglion left wrist    Tender:       None currently - patient points to ulnar wrist    Non Tender:       Remainder of the Wrist and Hand bilateral    ROM:       Full and symmetric active and passive range of motion of the forearm, wrist and digits bilateral with the exception of slightly limited extension on the left    Strength:       5/5 strength in the muscles of the hand, wrist and forearm bilateral    Special Tests:        neg (-) TFCC compression test left    Neurovascular:       2+ radial pulses bilaterally with brisk capillary refill and      normal sensation to light touch in the radial, median and ulnar nerve distributions      Radiology  I ordered, visualized and reviewed these images with the patient  XR WRIST LEFT G/E 3 VIEWS 12/4/2020 10:46 AM   HISTORY: Left wrist pain                                                         IMPRESSION: Negative exam.    Assessment:  1. Left wrist pain    2. Ganglion of left wrist      We discussed these other possible diagnosis: ganglion cyst, ECU tendinosis, TFCC tear  Will obtain MRI given chronic " problems, discussed OT referral for custom bracing.  Would consider cyst procedure, hand surgery referral pending clinical course.    Plan:  - Today's Plan of Care:  MRI of the Left wrist - Call [phone] to schedule MRI  Rehab: [facility] - [phone]  - consider custom bracing    -We also discussed other future treatment options:  Ganglion cyst procedure  Referral to hand surgery    Follow Up: In clinic with Dr. Diez after MRI (wait at least 1-2 days)    Concerning signs and symptoms were reviewed.  The patient expressed understanding of this management plan and all questions were answered at this time.    Kailee Diez MD CAQ  Primary Care Sports Medicine  [facility]

## 2020-12-07 ENCOUNTER — HOSPITAL ENCOUNTER (OUTPATIENT)
Dept: OCCUPATIONAL THERAPY | Facility: CLINIC | Age: 45
Setting detail: THERAPIES SERIES
End: 2020-12-07
Attending: PEDIATRICS
Payer: COMMERCIAL

## 2020-12-07 DIAGNOSIS — M67.432 GANGLION OF LEFT WRIST: ICD-10-CM

## 2020-12-07 DIAGNOSIS — M25.532 LEFT WRIST PAIN: ICD-10-CM

## 2020-12-07 PROCEDURE — 97165 OT EVAL LOW COMPLEX 30 MIN: CPT | Mod: GO | Performed by: OCCUPATIONAL THERAPIST

## 2020-12-07 PROCEDURE — 97140 MANUAL THERAPY 1/> REGIONS: CPT | Mod: GO | Performed by: OCCUPATIONAL THERAPIST

## 2020-12-07 PROCEDURE — 97110 THERAPEUTIC EXERCISES: CPT | Mod: GO | Performed by: OCCUPATIONAL THERAPIST

## 2020-12-07 NOTE — PROGRESS NOTES
12/07/20   Hand Therapy Evaluation   General Information/History   Start Of Care Date 12/07/20   Referring Physician Dr. Diez   Orders Evaluate And Treat As Indicated   Orders Date 12/04/20   Medical Diagnosis Left wrist pain , left ganglion cyst   Additional Occupational Profile Info/Pertinent history of current problem On Labor Day of this year, patient felt something when getting up from a chair while trying to pull up pants. She has had pain on and off since then   Previous treatment or current condition massage on Saturday and feels better , ice   Past medical history lateral epicondylitis, ganglion cyst- see chart, smoker 1 ppd   How/Where did it occur From insidious onset;At home   Onset date of current episode/exacerbation 09/07/20   Chronicity New   Hand Dominance Right   Affected side Left   Functional limitations perform activities of daily living;perform required work activities;perform desired leisure / sports activities   Reported Symptoms Pain   Prior level of function Independent ADL;Independent IADL   Important Activities crocheting   Patient role/Employment history Employed   Occupation stylist   Employment Status Working in normal job without restrictions   Primary Job Tasks Gripping/pinching;Prolonged standing;Reaching   Occupation Comments bothers to wash hair , squeeze color   Patient/Family goals statement Patient would like to be able to button and use at work without pain.   Fall Risk Screen   Fall screen completed by OT   Have you fallen 2 or more times in the past year? No   Have you fallen and had an injury in the past year? No   Is patient a fall risk? No   Abuse Screen (yes response referral indicated)   Feels Unsafe at Home or Work/School no   Feels Threatened by Someone no   Does Anyone Try to Keep You From Having Contact with Others or Doing Things Outside Your Home? no   Physical Signs of Abuse Present no   Pain   Pain Primary Pain Report   Primary Pain Report   Location  ulnar wrist and hand   Pain Quality Sharp;Aching   Frequency Intermittent   Scale 1/10;7/10   Pain Is Worse In The CP.M.;Worse During The Day   Pain Is Exacerbated By Carrying;Pinching;Gripping;Activity/movement   Pain Is Relieved By Rest;Splints;Ice;Nsaids,analgesics   Progression Since Onset Gradually Improving   Edema   Edema Distal Wrist Crease   Distal Wrist Crease (measured in cm)   Distal Wrist Crease - - Left 17   Distal Wrist Crease - - Right 16   Tenderness   Tenderness Other (see comments)   Palpation   Palpation Assessed   Left Hand  Palpation Comments tenderness with palpation to fifth metacarpal and extensor wad   ROM   ROM AROM   AROM   AROM Wrist   Wrist   Wrist Extension - Left 70   Wrist Extension - Right 65   Wrist Flexion- Left 65   Wrist Flexion - Right 65   Special Tests   Special Tests Assessed   Sensation Findings   Sensation Findings Other (see comments)   Sensation Comments no sensory complaints   Strength   Strength Strength    Avg - Left 42    Avg - Right 54   Lateral Pinch - Left 14   Lateral Pinch - Right 14   3 Point Pinch - Left 8   3 Point Pinch - Right 10   3 Point Pinch Comments wrist MMT strong 5/5 and pain free   Education Assessment   Preferred Learning Style Listening   Barriers to Learning No barriers   Therapy Interventions   Planned Therapy Interventions Ultrasound;Iontophoresis (list drug);Light Therapy;Fluidotherapy;Strengthening;ROM;Manual Therapy;Education of splint wear, care, fit and precautions;Edema Management;Self Care/Home Management;Home Program   Therapy plan comments to be added as appropriate   Clinical Impression   Criteria for Skilled Therapeutic Interventions Met yes;treatment indicated   OT Diagnosis decreased ADL's IADL's   Influenced by the following impairments Pain;Edema;Decreased range of motion;Decreased strength   Assessment of Occupational Performance 3-5 Performance Deficits   Identified Performance Deficits opening jar, hair styling doing  buttons, lifting groceries, pushing up from a chair   Clinical Decision Making (Complexity) Low complexity   Therapy Frequency 1x/week   Predicted Duration of Therapy Intervention (days/wks) 6 weeks   Risks and Benefits of Treatment have been explained. Yes   Patient, Family & other staff in agreement with plan of care Yes   Clinical Impression Comments clinical findings consistent with referral Diagnosis   Hand Eval Goals   Hand Eval Goals 1;2   Hand Goal 1   Goal Identifier gripping   Goal Description Patient to increase  by 10# with pain less than 3/10 so she can squeeze color bottle at work   Target Date 01/18/21   Hand Goal 2   Goal Identifier buttoning   Goal Description Patient to report 80% improvement in being able to button her pants.   Target Date 01/07/21   Fauzia Montaño OTR/L CHT  Occupational Therapist, Certified Hand Therapist

## 2020-12-11 ENCOUNTER — HOSPITAL ENCOUNTER (OUTPATIENT)
Dept: MRI IMAGING | Facility: CLINIC | Age: 45
Discharge: HOME | End: 2020-12-11
Attending: PEDIATRICS | Admitting: PEDIATRICS
Payer: COMMERCIAL

## 2020-12-11 DIAGNOSIS — M25.532 LEFT WRIST PAIN: ICD-10-CM

## 2020-12-11 DIAGNOSIS — M67.432 GANGLION OF LEFT WRIST: ICD-10-CM

## 2020-12-11 PROCEDURE — 73221 MRI JOINT UPR EXTREM W/O DYE: CPT | Mod: LT

## 2020-12-11 PROCEDURE — 73221 MRI JOINT UPR EXTREM W/O DYE: CPT | Mod: 26 | Performed by: RADIOLOGY

## 2020-12-14 ENCOUNTER — HOSPITAL ENCOUNTER (OUTPATIENT)
Dept: OCCUPATIONAL THERAPY | Facility: CLINIC | Age: 45
Setting detail: THERAPIES SERIES
End: 2020-12-14
Attending: PEDIATRICS
Payer: COMMERCIAL

## 2020-12-14 ENCOUNTER — MYC MEDICAL ADVICE (OUTPATIENT)
Dept: ORTHOPEDICS | Facility: CLINIC | Age: 45
End: 2020-12-14

## 2020-12-14 DIAGNOSIS — S63.8X9A TEAR OF SCAPHOLUNATE LIGAMENT: Primary | ICD-10-CM

## 2020-12-14 DIAGNOSIS — M25.532 LEFT WRIST PAIN: ICD-10-CM

## 2020-12-14 DIAGNOSIS — M67.432 GANGLION OF LEFT WRIST: ICD-10-CM

## 2020-12-14 PROCEDURE — 97140 MANUAL THERAPY 1/> REGIONS: CPT | Mod: GO | Performed by: OCCUPATIONAL THERAPIST

## 2020-12-14 PROCEDURE — L3906 WHO W/O JOINTS CF: HCPCS | Performed by: OCCUPATIONAL THERAPIST

## 2020-12-14 PROCEDURE — 97035 APP MDLTY 1+ULTRASOUND EA 15: CPT | Mod: GO | Performed by: OCCUPATIONAL THERAPIST

## 2020-12-15 NOTE — TELEPHONE ENCOUNTER
The ganglion cyst and ligament tear are likely related. Ganglion cysts are usually a response to something else going on in a ligament, tendon, cartilage or joint.  Given the scapholunate tear mentioned, would recommend hand surgery referral. It's ok to continue therapy if desired. Patient could also wait on further visits until after surgical consultation.    I placed the referral order, please instruct patient on how to schedule.    Let me know if patient has further questions, I am happy to review imaging with clinic or virtual visit if desired.    Kailee Diez MD

## 2020-12-15 NOTE — TELEPHONE ENCOUNTER
Please see MyChart and advise      Sasha Young MS ATC      Results for orders placed or performed during the hospital encounter of 12/11/20   MR Wrist Left w/o Contrast    Narrative    MR left wrist without contrast 12/11/2020 4:32 PM    Techniques: Multiplanar multisequence imaging of the left wrist was  obtained without administration of intra-articular or intravenous  contrast using routing protocol.    History: Eval ganglion cyst, tendinosis; Left wrist pain; Ganglion of  left wrist    Comparison: 12/4/2020    Findings:    Markers placed at the dorsal aspect of the wrist at the proximal pole  of scaphoid area. Underlying the marker, multi-lobulated/internally  septated cystic appearing mass 9 x 3 x 11 mm intimate with dorsal  intercarpal ligament and/or dorsal component of the scapholunate  interosseous ligament with mild regional soft tissue edema, most  compatible with ganglion/synovial cyst.    Triangular Fibrocartilage: No radial, central, or ulnar-sided  triangular cartilage perforation.    Interosseous Ligaments:  Scapholunate ligament: Intermediately increased T2 signal of the  dorsal component. Scapholunate interosseous ligament, likely  degeneration. Cystlike change with edema-like marrow signal intensity  of the lunate at the scapholunate joint interface. Possible  full-thickness tear at the membranous component of scapholunate  interosseous ligament.    Lunatotriquetral ligament: Grossly intact.    Bones: No fracture, stress reaction.    Articulations:  Joint effusion: Physiologic.    Tendons:  Signal attenuation on multiple tendons of multiple segments,  presumably magic angle artifacts related though this compromises  assessment particularly of tendinosis and partial tearing.  Flexor tendons: Grossly intact.  Extensor tendons: Grossly intact.    Miscellaneous soft tissues: No volar or dorsal ganglion cysts.      Impression    IMPRESSION:  1. Relatively corresponding to the marker,  multi-lobulated/internally  septated cystic appearing mass 9 x 3 x 11 mm intimate with dorsal  intercarpal ligament and/or dorsal component of the scapholunate  interosseous ligament, most compatible with ganglion/synovial cyst.  2. Query full thickness tear of scapholunate interosseous ligament  membranous component and degeneration of the dorsal component.  Associated subchondral cyst-like change/edema of lunate.      CALLIE GLASER

## 2020-12-21 ENCOUNTER — HOSPITAL ENCOUNTER (OUTPATIENT)
Dept: OCCUPATIONAL THERAPY | Facility: CLINIC | Age: 45
Setting detail: THERAPIES SERIES
End: 2020-12-21
Attending: PEDIATRICS
Payer: COMMERCIAL

## 2020-12-21 PROCEDURE — 97022 WHIRLPOOL THERAPY: CPT | Mod: GO | Performed by: OCCUPATIONAL THERAPIST

## 2021-01-08 ENCOUNTER — HOSPITAL ENCOUNTER (OUTPATIENT)
Dept: CT IMAGING | Facility: CLINIC | Age: 46
Discharge: HOME | End: 2021-01-08
Attending: ORTHOPAEDIC SURGERY | Admitting: ORTHOPAEDIC SURGERY
Payer: COMMERCIAL

## 2021-01-08 DIAGNOSIS — T14.8XXA MUSCLE STRAIN: ICD-10-CM

## 2021-01-08 PROCEDURE — 73200 CT UPPER EXTREMITY W/O DYE: CPT | Mod: LT

## 2021-02-19 ENCOUNTER — OFFICE VISIT (OUTPATIENT)
Dept: FAMILY MEDICINE | Facility: CLINIC | Age: 46
End: 2021-02-19
Payer: COMMERCIAL

## 2021-02-19 VITALS
HEIGHT: 67 IN | OXYGEN SATURATION: 98 % | DIASTOLIC BLOOD PRESSURE: 78 MMHG | TEMPERATURE: 98.2 F | SYSTOLIC BLOOD PRESSURE: 112 MMHG | WEIGHT: 190 LBS | HEART RATE: 86 BPM | BODY MASS INDEX: 29.82 KG/M2 | RESPIRATION RATE: 16 BRPM

## 2021-02-19 DIAGNOSIS — N30.00 ACUTE CYSTITIS WITHOUT HEMATURIA: Primary | ICD-10-CM

## 2021-02-19 LAB
ALBUMIN UR-MCNC: 100 MG/DL
APPEARANCE UR: ABNORMAL
BACTERIA #/AREA URNS HPF: ABNORMAL /HPF
BILIRUB UR QL STRIP: NEGATIVE
COLOR UR AUTO: YELLOW
GLUCOSE UR STRIP-MCNC: NEGATIVE MG/DL
HGB UR QL STRIP: ABNORMAL
KETONES UR STRIP-MCNC: NEGATIVE MG/DL
LEUKOCYTE ESTERASE UR QL STRIP: ABNORMAL
NITRATE UR QL: POSITIVE
PH UR STRIP: 7 PH (ref 5–7)
RBC #/AREA URNS AUTO: ABNORMAL /HPF
SOURCE: ABNORMAL
SP GR UR STRIP: 1.02 (ref 1–1.03)
UROBILINOGEN UR STRIP-ACNC: 0.2 EU/DL (ref 0.2–1)
WBC #/AREA URNS AUTO: ABNORMAL /HPF
WBC CLUMPS #/AREA URNS HPF: PRESENT /HPF

## 2021-02-19 PROCEDURE — 87186 SC STD MICRODIL/AGAR DIL: CPT | Performed by: FAMILY MEDICINE

## 2021-02-19 PROCEDURE — 99213 OFFICE O/P EST LOW 20 MIN: CPT | Performed by: FAMILY MEDICINE

## 2021-02-19 PROCEDURE — 87088 URINE BACTERIA CULTURE: CPT | Performed by: FAMILY MEDICINE

## 2021-02-19 PROCEDURE — 81001 URINALYSIS AUTO W/SCOPE: CPT | Performed by: FAMILY MEDICINE

## 2021-02-19 PROCEDURE — 87086 URINE CULTURE/COLONY COUNT: CPT | Performed by: FAMILY MEDICINE

## 2021-02-19 RX ORDER — CIPROFLOXACIN 500 MG/1
500 TABLET, FILM COATED ORAL 2 TIMES DAILY
Qty: 14 TABLET | Refills: 0 | Status: SHIPPED | OUTPATIENT
Start: 2021-02-19 | End: 2021-02-26

## 2021-02-19 ASSESSMENT — MIFFLIN-ST. JEOR: SCORE: 1540.49

## 2021-02-19 NOTE — PROGRESS NOTES
"Agnieszka Wiggins is a 46 year old who presents for the following health issues  accompanied by herself:    HPI       Genitourinary - Female  Onset/Duration: 2 days ago.  Description:   Painful urination (Dysuria): YES- A little bit.           Frequency: YES  Blood in urine (Hematuria): no  Delay in urine (Hesitency): YES- A little bit.  Intensity: moderate for the flank pain.  Progression of Symptoms:  worsening  Accompanying Signs & Symptoms:  Fever/chills: no  Flank pain: YES- This am.  Nausea and vomiting: Nausea this am.  Vaginal symptoms: none  Abdominal/Pelvic Pain: YES- Bladder pain.  History:   History of frequent UTI s: YES- Was seen by Urology 1-2 years ago.  History of kidney stones: YES- About 23 years ago.  Sexually Active: YES  Possibility of pregnancy: No-IUD is in place.  Precipitating or alleviating factors: None  Therapies tried and outcome:  Uristat-took last night and the night prior.  None today.  Tylenol as needed.      Current Outpatient Medications   Medication Instructions     acetaminophen (TYLENOL) 500-1,000 mg, Oral, EVERY 6 HOURS PRN     levonorgestrel (MIRENA) 20 mcg, Intrauterine, ONCE       Patient Active Problem List   Diagnosis     Varicose veins of lower extremities with complications     TMJ (temporomandibular joint syndrome)     Presence of intrauterine contraceptive device (IUD)     Encounter for tobacco use cessation counseling       Blood pressure 112/78, pulse 86, temperature 98.2  F (36.8  C), temperature source Tympanic, resp. rate 16, height 1.695 m (5' 6.75\"), weight 86.2 kg (190 lb), SpO2 98 %, not currently breastfeeding.    Exam:  GENERAL APPEARANCE: healthy, alert and no distress  ABDOMEN: tender over the bladder and the left CVA area, and the right is not tender.   SKIN: no suspicious lesions or rashes  PSYCH: mentation appears normal and affect normal/bright    UA:shows a UTI      (N30.00) Acute cystitis without hematuria  Comment:   Plan: ciprofloxacin " (CIPRO) 500 MG tablet        We discussed the results  And there is an infection. Stay well hydrated.   Start Cipro at 500 mg twice daily for 7 days. Monitor for a fever and vomiting and increased back pain.    Gradually increase activities. Follow up as needed. We will call the culture results.      Colby Patino MD

## 2021-02-19 NOTE — PATIENT INSTRUCTIONS
Thank you for choosing [facility].  You may be receiving an email and/or telephone survey request from [facility] [employer] regarding your visit today.  Please take a few minutes to respond to the survey to let us know how we are doing.      If you have questions or concerns, please contact us via [employer] or you can contact your care team at [phone].    Our Clinic hours are:  Monday 6:40 am  to 7:00 pm  Tuesday -Friday 6:40 am to 5:00 pm    The [place] outpatient lab hours are:  Monday - Friday 6:10 am to 4:45 pm  Saturdays 7:00 am to 11:00 am  Appointments are required, call [phone]    If you have clinical questions after hours or would like to schedule an appointment,  call the clinic at [phone].      (N30.00) Acute cystitis without hematuria  Comment:   Plan: ciprofloxacin (CIPRO) 500 MG tablet        We discussed the results  And there is an infection. Stay well hydrated.   Start Cipro at 500 mg twice daily for 7 days. Monitor for a fever and vomiting and increased back pain.    Gradually increase activities. Follow up as needed. We will call the culture results.

## 2021-02-20 LAB
BACTERIA SPEC CULT: ABNORMAL
Lab: ABNORMAL
SPECIMEN SOURCE: ABNORMAL

## 2021-02-23 ENCOUNTER — TELEPHONE (OUTPATIENT)
Dept: FAMILY MEDICINE | Facility: CLINIC | Age: 46
End: 2021-02-23

## 2021-02-23 NOTE — TELEPHONE ENCOUNTER
Patient returned call to clinic needs clarification on the results from urine culture.    Florina Rojo,  Station

## 2021-02-23 NOTE — TELEPHONE ENCOUNTER
Patient wanted to review culture results. RN relayed from provider note that the bacteria that grew should be sensitive to the antibiotic she's taking, so she can continue with the Cipro as directed.  She voices understanding, does report improvement of her symptoms and will reach out PRN for any ongoing symptoms after she finishes Cipro.  No further questions/concerns at this time.     Maday Barton RN  [facility]

## 2021-02-23 NOTE — TELEPHONE ENCOUNTER
RN sees there is also a message in results notes.  Returned call to patient with no answer.  Patient was instructed to return call to [facility] main line at [phone] to speak with an RN.    Maday Barton RN  [facility]

## 2021-04-09 NOTE — PROGRESS NOTES
12/21/20   Note: This is a copy of patient's last daily visit and will also serve as their Discharge Summary as they have not been in for further treatment 30 days past this date. Final assessment of goals and physical and functional status , therefore unavailable.     Signing Clinician's Name / Credentials   Signing clinician's name / credentials Fauzia Montaño OTR/L CHT   Session Number   Session Number 3 ( reporting period 12/7/20 to 12/21/20)   Quick Add   Quick Add  Hand   Hand   Referring Physician Dr. 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   Target Date 01/07/21   Subjective Report   Subjective Report MRI back and showing a tear.. See's Hand Surgeon on Jan 4th   Initial Pain level 7/10  (at worst 1 at best)   Current Pain level 7/10   Objective Measures   Objective Measures Objective Measure 1   Objective Measure 1   Objective Measure palpation to ulnar wrist   Details 2/10   Modalities   Modalities Fluidotherapy   Fluidotherapy   Fluidotherapy Minutes (26121) 15 Minutes   Skilled Intervention to decrease pain   Patient Response felt better   Treatment Detail 115   Therapeutic Interventions   Therapeutic Interventions Therapeutic Procedure/Exercise;Manual Therapy;Orthotics   Orthotics   Treatment Detail adjusted for  comfort today 10 min no charge   Assessments Completed   Assessments Completed Has more pain in ulnar wrist than dorsal. Patient feeling better with splint on and is able to wear at work.   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

## 2021-09-18 ENCOUNTER — HEALTH MAINTENANCE LETTER (OUTPATIENT)
Age: 46
End: 2021-09-18

## 2022-01-08 ENCOUNTER — HEALTH MAINTENANCE LETTER (OUTPATIENT)
Age: 47
End: 2022-01-08

## 2022-02-17 ENCOUNTER — HOSPITAL ENCOUNTER (OUTPATIENT)
Dept: MAMMOGRAPHY | Facility: CLINIC | Age: 47
Discharge: HOME | End: 2022-02-17
Attending: FAMILY MEDICINE | Admitting: FAMILY MEDICINE
Payer: COMMERCIAL

## 2022-02-17 DIAGNOSIS — Z12.31 SCREENING MAMMOGRAM FOR HIGH-RISK PATIENT: ICD-10-CM

## 2022-02-17 PROCEDURE — 77067 SCR MAMMO BI INCL CAD: CPT

## 2022-06-02 ENCOUNTER — OFFICE VISIT (OUTPATIENT)
Dept: OBGYN | Facility: CLINIC | Age: 47
End: 2022-06-02
Payer: COMMERCIAL

## 2022-06-02 VITALS
TEMPERATURE: 97.8 F | HEART RATE: 75 BPM | SYSTOLIC BLOOD PRESSURE: 134 MMHG | BODY MASS INDEX: 32.03 KG/M2 | WEIGHT: 203 LBS | DIASTOLIC BLOOD PRESSURE: 85 MMHG

## 2022-06-02 DIAGNOSIS — Z12.4 CERVICAL CANCER SCREENING: ICD-10-CM

## 2022-06-02 DIAGNOSIS — Z00.00 ENCOUNTER FOR PREVENTATIVE ADULT HEALTH CARE EXAMINATION: ICD-10-CM

## 2022-06-02 DIAGNOSIS — Z12.11 COLON CANCER SCREENING: Primary | ICD-10-CM

## 2022-06-02 DIAGNOSIS — D22.9 ATYPICAL MOLE: ICD-10-CM

## 2022-06-02 PROCEDURE — 87624 HPV HI-RISK TYP POOLED RSLT: CPT | Performed by: OBSTETRICS & GYNECOLOGY

## 2022-06-02 PROCEDURE — 99386 PREV VISIT NEW AGE 40-64: CPT | Performed by: OBSTETRICS & GYNECOLOGY

## 2022-06-02 PROCEDURE — G0145 SCR C/V CYTO,THINLAYER,RESCR: HCPCS | Performed by: OBSTETRICS & GYNECOLOGY

## 2022-06-02 NOTE — PROGRESS NOTES
SUBJECTIVE:   CC: Rosalie Sauceda is an 47 year old woman who presents for preventive health visit. Patient states that she is doing well. Rare spotting with IUD in place. Has had some hot flashes. Otherwise denies concerns. Using E cigs      Patient has been advised of split billing requirements and indicates understanding: Yes  Healthy Habits:    Do you get at least three servings of calcium containing foods daily (dairy, green leafy vegetables, etc.)? no, taking calcium and/or vitamin D supplement: yes - vitamins    Amount of exercise or daily activities, outside of work: 2 day(s) per week    Problems taking medications regularly No    Medication side effects: No    Have you had an eye exam in the past two years? yes    Do you see a dentist twice per year? yes    Do you have sleep apnea, excessive snoring or daytime drowsiness?yes       Today's PHQ-2 Score:   PHQ-2 ( 1999 [employer]) 6/2/2022 8/26/2019   Q1: Little interest or pleasure in doing things 0 0   Q2: Feeling down, depressed or hopeless 0 0   PHQ-2 Score 0 0   PHQ-2 Total Score (12-17 Years)- Positive if 3 or more points; Administer PHQ-A if positive - 0       Abuse: Current or Past(Physical, Sexual or Emotional)- No  Do you feel safe in your environment? Yes    Have you ever done Advance Care Planning? (For example, a Health Directive, POLST, or a discussion with a medical provider or your loved ones about your wishes): No, advance care planning information given to patient to review.  Patient declined advance care planning discussion at this time.    Social History     Tobacco Use     Smoking status: Current Every Day Smoker     Packs/day: 1.00     Years: 12.00     Pack years: 12.00     Types: Cigarettes     Smokeless tobacco: Never Used   Substance Use Topics     Alcohol use: Yes     Comment: 10 drinks per month- or less.     If you drink alcohol do you typically have >3 drinks per day or >7 drinks per week? no - AUDIT SCORE:     No flowsheet data  found.                     Reviewed orders with patient.  Reviewed health maintenance and updated orders accordingly - Yes  Lab work is in process    FHS-7:   Breast CA Risk Assessment (FHS-7) 2022   Did any of your first-degree relatives have breast or ovarian cancer? No   Did any of your relatives have bilateral breast cancer? No   Did any man in your family have breast cancer? No   Did any woman in your family have breast and ovarian cancer? No   Did any woman in your family have breast cancer before age 50 y? No   Do you have 2 or more relatives with breast and/or ovarian cancer? Yes   Do you have 2 or more relatives with breast and/or bowel cancer? No     click delete button to remove this line now  Mammogram Screening: Recommended annual mammography  Pertinent mammograms are reviewed under the imaging tab.    Pertinent mammograms are reviewed under the imaging tab.  History of abnormal Pap smear: perhaps remote hx of abnnormal, no recent abnormal paps - age 30-65 PAP every 5 years with negative HPV co-testing recommended  PAP / HPV Latest Ref Rng & Units 2007   PAP (Historical) - NIL NIL NIL   HPV16 NEG Negative - -   HPV18 NEG Negative - -   HRHPV NEG Negative - -     Reviewed and updated as needed this visit by clinical staff   Tobacco  Allergies  Meds                Reviewed and updated as needed this visit by Provider                   Past Medical History:   Diagnosis Date     Lumbago     back injury in the past      Past Surgical History:   Procedure Laterality Date     C/SECTION, LOW TRANSVERSE  2006     LIGATN/STRIP LONG & SHORT SAPHEN  2007     OB History    Para Term  AB Living   2 2 2 0 0 2   SAB IAB Ectopic Multiple Live Births   0 0 0 0 2      # Outcome Date GA Lbr Shailesh/2nd Weight Sex Delivery Anes PTL Lv   2 Term 06 38w2d  3.969 kg (8 lb 12 oz) F CS SPINAL  ARLEEN      Birth Comments: CS - elective (big)       Name: Leonela      Apgar1: 9   Apgar5: 9   1 Term 98 40w0d 15:00 4.479 kg (9 lb 14 oz) M    ARLEEN      Birth Comments: induced-large-hemorrhage-repair      Name: Chester       ROS:  CONSTITUTIONAL: NEGATIVE for fever, chills, change in weight  INTEGUMENTARU/SKIN: NEGATIVE for worrisome rashes, moles or lesions  EYES: NEGATIVE for vision changes or irritation  ENT: NEGATIVE for ear, mouth and throat problems  RESP: NEGATIVE for significant cough or SOB  BREAST: NEGATIVE for masses, tenderness or discharge  CV: NEGATIVE for chest pain, palpitations or peripheral edema  GI: NEGATIVE for nausea, abdominal pain, heartburn, or change in bowel habits  : NEGATIVE for unusual urinary or vaginal symptoms. Periods are regular.  MUSCULOSKELETAL: NEGATIVE for significant arthralgias or myalgia  NEURO: NEGATIVE for weakness, dizziness or paresthesias  PSYCHIATRIC: NEGATIVE for changes in mood or affect    OBJECTIVE:   /85 (BP Location: Right arm, Patient Position: Chair, Cuff Size: Adult Large)   Pulse 75   Temp 97.8  F (36.6  C) (Tympanic)   Wt 92.1 kg (203 lb)   Breastfeeding No   BMI 32.03 kg/m    EXAM:  GENERAL: healthy, alert and no distress  EYES: Eyes grossly normal to inspection, PERRL and conjunctivae and sclerae normal  HENT: ear canals and TM's normal, nose and mouth without ulcers or lesions  NECK: no adenopathy, no asymmetry, masses, or scars and thyroid normal to palpation  RESP: lungs clear to auscultation - no rales, rhonchi or wheezes  BREAST:  small 5 mm flat, brown pigmented lesion on the right breast with two colors and irregular border-- otherwise declined exam given recent mammogram  CV: regular rate and rhythm, normal S1 S2, no S3 or S4, no murmur, click or rub, no peripheral edema and peripheral pulses strong  ABDOMEN: soft, nontender, no hepatosplenomegaly, no masses and bowel sounds normal   (female): normal female external genitalia, normal urethral meatus, vaginal mucosa pink, moist, well rugated, and normal  "cervix/adnexa/uterus without masses or discharge  MS: no gross musculoskeletal defects noted, no edema  SKIN: no suspicious lesions or rashes  NEURO: Normal strength and tone, mentation intact and speech normal  PSYCH: mentation appears normal, affect normal/bright    Diagnostic Test Results:  Labs reviewed in Epic    ASSESSMENT/PLAN:       ICD-10-CM    1. Colon cancer screening  Z12.11 Adult Gastro Ref - Procedure Only   2. Cervical cancer screening  Z12.4 Pap screen with HPV - recommended age 30 - 65 years   3. Atypical mole  D22.9 Adult Dermatology Referral   4. Encounter for preventative adult health care examination  Z00.00 TSH with free T4 reflex     HIV Antigen Antibody Combo     Hepatitis C antibody     Lipid panel reflex to direct LDL Fasting     Vitamin D Deficiency     Glucose   Irregular mole on right breast noted, recommended derm referral.  Screening labs placed.  Ordered colonoscopy as due  Recommended weight loss clinic-- has gained about 30 lbs in 2 years  Pap collected as due- perhaps remote hx of abnoraml     Patient has been advised of split billing requirements and indicates understanding: Yes  COUNSELING:   Reviewed preventive health counseling, as reflected in patient instructions       Regular exercise       Healthy diet/nutrition    Estimated body mass index is 32.03 kg/m  as calculated from the following:    Height as of 2/19/21: 1.695 m (5' 6.75\").    Weight as of this encounter: 92.1 kg (203 lb).    Weight management plan: Patient referred to endocrine and/or weight management specialty Discussed healthy diet and exercise guidelines    She reports that she has been smoking cigarettes. She has a 12.00 pack-year smoking history. She has never used smokeless tobacco.  Tobacco Cessation Action Plan:   Information offered: Patient not interested at this time      Counseling Resources:  ATP IV Guidelines  Pooled Cohorts Equation Calculator  Breast Cancer Risk Calculator  BRCA-Related Cancer " Risk Assessment: FHS-7 Tool  FRAX Risk Assessment  ICSI Preventive Guidelines  Dietary Guidelines for Americans, 2010  USDA's MyPlate  ASA Prophylaxis  Lung CA Screening    Griselda Spencer MD  [facility] [place]  6/2/2022 1:56 PM

## 2022-06-02 NOTE — NURSING NOTE
"Initial /85 (BP Location: Right arm, Patient Position: Chair, Cuff Size: Adult Large)   Pulse 75   Temp 97.8  F (36.6  C) (Tympanic)   Wt 92.1 kg (203 lb)   Breastfeeding No   BMI 32.03 kg/m   Estimated body mass index is 32.03 kg/m  as calculated from the following:    Height as of 2/19/21: 1.695 m (5' 6.75\").    Weight as of this encounter: 92.1 kg (203 lb). .    Urmila Kumar MA    "

## 2022-06-07 LAB
BKR LAB AP GYN ADEQUACY: NORMAL
BKR LAB AP GYN INTERPRETATION: NORMAL
BKR LAB AP HPV REFLEX: NORMAL
BKR LAB AP LMP: NORMAL
BKR LAB AP PREVIOUS ABNORMAL: NORMAL
PATH REPORT.COMMENTS IMP SPEC: NORMAL
PATH REPORT.COMMENTS IMP SPEC: NORMAL
PATH REPORT.RELEVANT HX SPEC: NORMAL

## 2022-06-09 LAB
HUMAN PAPILLOMA VIRUS 16 DNA: NEGATIVE
HUMAN PAPILLOMA VIRUS 18 DNA: NEGATIVE
HUMAN PAPILLOMA VIRUS FINAL DIAGNOSIS: NORMAL
HUMAN PAPILLOMA VIRUS OTHER HR: NEGATIVE

## 2022-07-12 DIAGNOSIS — Z12.12 SCREENING FOR MALIGNANT NEOPLASM OF THE RECTUM: Primary | ICD-10-CM

## 2022-07-20 ENCOUNTER — LAB (OUTPATIENT)
Dept: LAB | Facility: CLINIC | Age: 47
End: 2022-07-20
Payer: COMMERCIAL

## 2022-07-20 DIAGNOSIS — Z00.00 ENCOUNTER FOR PREVENTATIVE ADULT HEALTH CARE EXAMINATION: ICD-10-CM

## 2022-07-20 LAB
CHOLEST SERPL-MCNC: 236 MG/DL
DEPRECATED CALCIDIOL+CALCIFEROL SERPL-MC: 41 UG/L (ref 20–75)
FASTING STATUS PATIENT QL REPORTED: YES
FASTING STATUS PATIENT QL REPORTED: YES
GLUCOSE BLD-MCNC: 112 MG/DL (ref 70–99)
HCV AB SERPL QL IA: NONREACTIVE
HDLC SERPL-MCNC: 50 MG/DL
LDLC SERPL CALC-MCNC: 139 MG/DL
NONHDLC SERPL-MCNC: 186 MG/DL
TRIGL SERPL-MCNC: 234 MG/DL
TSH SERPL DL<=0.005 MIU/L-ACNC: 1.29 MU/L (ref 0.4–4)

## 2022-07-20 PROCEDURE — 36415 COLL VENOUS BLD VENIPUNCTURE: CPT

## 2022-07-20 PROCEDURE — 86803 HEPATITIS C AB TEST: CPT

## 2022-07-20 PROCEDURE — 80061 LIPID PANEL: CPT

## 2022-07-20 PROCEDURE — 82947 ASSAY GLUCOSE BLOOD QUANT: CPT

## 2022-07-20 PROCEDURE — 82306 VITAMIN D 25 HYDROXY: CPT

## 2022-07-20 PROCEDURE — 84443 ASSAY THYROID STIM HORMONE: CPT

## 2022-07-27 ENCOUNTER — OFFICE VISIT (OUTPATIENT)
Dept: ORTHOPEDICS | Facility: CLINIC | Age: 47
End: 2022-07-27
Payer: COMMERCIAL

## 2022-07-27 VITALS
DIASTOLIC BLOOD PRESSURE: 80 MMHG | HEART RATE: 64 BPM | SYSTOLIC BLOOD PRESSURE: 118 MMHG | BODY MASS INDEX: 32.03 KG/M2 | WEIGHT: 203 LBS

## 2022-07-27 DIAGNOSIS — M72.2 PLANTAR FASCIITIS: ICD-10-CM

## 2022-07-27 DIAGNOSIS — M79.672 BILATERAL FOOT PAIN: Primary | ICD-10-CM

## 2022-07-27 DIAGNOSIS — M77.41 METATARSALGIA OF BOTH FEET: ICD-10-CM

## 2022-07-27 DIAGNOSIS — M25.551 RIGHT HIP PAIN: ICD-10-CM

## 2022-07-27 DIAGNOSIS — M77.42 METATARSALGIA OF BOTH FEET: ICD-10-CM

## 2022-07-27 DIAGNOSIS — M79.671 BILATERAL FOOT PAIN: Primary | ICD-10-CM

## 2022-07-27 DIAGNOSIS — M25.851 RIGHT HIP IMPINGEMENT SYNDROME: ICD-10-CM

## 2022-07-27 DIAGNOSIS — M21.6X9 ACQUIRED PRONATION DEFORMITY OF ANKLE, UNSPECIFIED LATERALITY: ICD-10-CM

## 2022-07-27 PROCEDURE — 99214 OFFICE O/P EST MOD 30 MIN: CPT | Performed by: PEDIATRICS

## 2022-07-27 NOTE — PROGRESS NOTES
ASSESSMENT & PLAN    Rosalie was seen today for pain, pain and pain.    Diagnoses and all orders for this visit:    Bilateral foot pain  -     XR Foot Bilateral G/E 3 Views; Future  -     Physical Therapy Referral; Future    Plantar fasciitis  -     Physical Therapy Referral; Future    Metatarsalgia of both feet    Acquired pronation deformity of ankle, unspecified laterality    Right hip pain  -     XR Pelvis w Hip RT 1 View; Future  -     Physical Therapy Referral; Future    Right hip impingement syndrome  -     Physical Therapy Referral; Future      This issue is chronic and Unchanged.       We discussed these other possible diagnosis:  Bilateral foot pain with plantar fasciitis, metatarsalgia and ankle pronation likely contributing.  Discussed pathophysiology of this condition and implications.  Questions answered. Inserts or heel cup recommended. Good walking shoes or running shoes recommended for activities.  Minimize high-impact activities. Stretching twice daily. Ice after activity. We discussed possibly further treatment including night splints, or podiatry referral.    Right hip pain likely impingement.  Patient does have cam deformit deformities bilaterally and gluteal weakness more on the right.  We discussed the following treatment options: symptom treatment, activity modification/rest, imaging, rehab and referral. Following discussion, plan: We will start with physical therapy consider further options pending clinical course.      Plan:  - Today's Plan of Care:  Trial of Superfeet Inserts (size 9.5)  Referral to Physical Therapy - external    Discussed activity considerations and other supportive care including Ice/Heat, OTC and other topical medications as needed.    -We also discussed other future treatment options:  MRI right hip or consideration of US guided joint injection  Referral to Podiatry    Follow Up: 6 - 8 weeks as needed    Concerning signs and symptoms were reviewed.  The patient  expressed understanding of this management plan and all questions were answered at this time.    Kailee Diez MD [facility]  Sports Medicine Physician  [facility]      -----  Chief Complaint   Patient presents with     Right Foot - Pain     Left Foot - Pain     Right Hip - Pain       SUBJECTIVE  Rosalie Sauceda is a/an 47 year old female who is seen as a self referral for evaluation of bilateral feet and right hip.     The patient is seen by themselves.    Foot Pain:  Onset: 2 years(s) ago. Reports insidious onset without acute precipitating event.  Location of Pain: right foot & left foot; over MTP area, middle of the foot   - left foot MTP joint and arch  - right foot more arch pain    Worsened by: walking, standing   Better with: better shoe wear, massaging, CBD lotion   Treatments tried: rest/activity avoidance, casting/splinting/bracing and massage, gel shoe inserts   Associated symptoms: swelling, numbness, tingling and cracking noises (while walking, over sesmoids)    Right Hip:  Onset: 1 years(s) ago. Reports insidious onset without acute precipitating event.  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    REVIEW OF SYSTEMS:  Review of Systems  Skin: no bruising, no swelling  Musculoskeletal: as above  Neurologic: no numbness, paresthesias  Remainder of review of systems is negative including constitutional, CV, pulmonary, GI, except as noted in HPI or medical history.    OBJECTIVE:  /80   Pulse 64   Wt 92.1 kg (203 lb)   BMI 32.03 kg/m     General: healthy, alert and in no  distress  HEENT: no scleral icterus or conjunctival erythema  Skin: no suspicious lesions or rash. No jaundice.  CV: distal perfusion intact  Resp: normal respiratory effort without conversational dyspnea   Psych: normal mood and affect  Gait: normal steady gait with appropriate coordination and balance  Neuro: Normal light sensory exam of lower extremity    Bilateral hip exam    Inspection:      no edema or ecchymosis in hip area    Tender:      none right - points to groin    Non Tender:      remainder of the hip area bilateral    ROM:     Full active and passive ROM  bilateral    Strength:      flexion 5/5 bilateral       abduction 5-/5 right       adduction 5/5 bilateral    Sensation:      grossly intact in hip and thigh    Special Tests:      neg (-) JANAY right       positive (+) FADIR right    Bilateral Foot and Ankle Exam:    Inspection:       no visible ecchymosis       no visible edema or effusion    Foot inspection:       no deformity noted       ankle pronation    Tender:       None - point to arch bilaterally and MTP joints on the left    Non-Tender:       remainder of ankle and foot bilateral    ROM:        Full active and passive ROM, ankle dorsiflexion, plantarflexion, inversion, eversion, great toe dorsiflexion, remainder of toes, midfoot and subtalar bilateral    Strength:       ankle dorsiflexion 5/5 bilateral       plantarflexion 5/5 bilateral       inversion 5/5 bilateral       eversion 5/5 bilateral    Special Tests:       neg (-) anterior drawer bilateral       neg (-) talar tilt bilateral    Gait:       normal    Lower extremity alignment:       Normal    Flexibility:       Ankle dorsiflexion (with leg extended) 0 degrees bilateral    Proprioception       normal    Neurovascular:       2+ peripheral pulses bilaterally and brisk capillary refill       sensation grossly intact      RADIOLOGY:  I independently ordered, visualized and reviewed these images with the patient  3 XR views of  bilateral feet reviewed: no acute bony abnormality, no significant degenerative change  - will follow official read    AP Pelvis and right lateral XR views of hips reviewed: bilateral CAM deformities, no significant degenerative change  - will follow official read      Review of the result(s) of each unique test - XR

## 2022-07-27 NOTE — PATIENT INSTRUCTIONS
We discussed these other possible diagnosis:  Bilateral foot pain with plantar fasciitis, metatarsalgia and ankle pronation likely contributing.  Discussed pathophysiology of this condition and implications.  Questions answered. Inserts or heel cup recommended. Good walking shoes or running shoes recommended for activities.  Minimize high-impact activities. Stretching twice daily. Ice after activity. We discussed possibly further treatment including night splints, or podiatry referral.    Right hip pain likely impingement.  Patient does have cam deformit deformities bilaterally and gluteal weakness more on the right.  We discussed the following treatment options: symptom treatment, activity modification/rest, imaging, rehab and referral. Following discussion, plan: We will start with physical therapy consider further options pending clinical course.      Plan:  - Today's Plan of Care:  Trial of Superfeet Inserts (size 9.5)  Referral to Physical Therapy - external    Discussed activity considerations and other supportive care including Ice/Heat, OTC and other topical medications as needed.    -We also discussed other future treatment options:  MRI right hip or consideration of US guided joint injection  Referral to Podiatry    Follow Up: 6 - 8 weeks as needed    If you have any further questions for your physician or physician s care team you can call [phone] and use option 3 to leave a voice message.

## 2022-07-27 NOTE — LETTER
7/27/2022         RE: Rosalie Sauceda  [address]  [facility] [address]        Dear Colleague,    Thank you for referring your patient, Rosalie Sauceda, to the [facility] [place]. Please see a copy of my visit note below.    ASSESSMENT & PLAN    Rosalie was seen today for pain, pain and pain.    Diagnoses and all orders for this visit:    Bilateral foot pain  -     XR Foot Bilateral G/E 3 Views; Future  -     Physical Therapy Referral; Future    Plantar fasciitis  -     Physical Therapy Referral; Future    Metatarsalgia of both feet    Acquired pronation deformity of ankle, unspecified laterality    Right hip pain  -     XR Pelvis w Hip RT 1 View; Future  -     Physical Therapy Referral; Future    Right hip impingement syndrome  -     Physical Therapy Referral; Future      This issue is chronic and Unchanged.       We discussed these other possible diagnosis:  Bilateral foot pain with plantar fasciitis, metatarsalgia and ankle pronation likely contributing.  Discussed pathophysiology of this condition and implications.  Questions answered. Inserts or heel cup recommended. Good walking shoes or running shoes recommended for activities.  Minimize high-impact activities. Stretching twice daily. Ice after activity. We discussed possibly further treatment including night splints, or podiatry referral.    Right hip pain likely impingement.  Patient does have cam deformit deformities bilaterally and gluteal weakness more on the right.  We discussed the following treatment options: symptom treatment, activity modification/rest, imaging, rehab and referral. Following discussion, plan: We will start with physical therapy consider further options pending clinical course.      Plan:  - Today's Plan of Care:  Trial of Superfeet Inserts (size 9.5)  Referral to Physical Therapy - external    Discussed activity considerations and other supportive care including Ice/Heat, OTC and other  topical medications as needed.    -We also discussed other future treatment options:  MRI right hip or consideration of US guided joint injection  Referral to Podiatry    Follow Up: 6 - 8 weeks as needed    Concerning signs and symptoms were reviewed.  The patient expressed understanding of this management plan and all questions were answered at this time.    Kailee Diez MD [facility]  Sports Medicine Physician  [facility]      -----  Chief Complaint   Patient presents with     Right Foot - Pain     Left Foot - Pain     Right Hip - Pain       SUBJECTIVE  Rosalie Sauceda is a/an 47 year old female who is seen as a self referral for evaluation of bilateral feet and right hip.     The patient is seen by themselves.    Foot Pain:  Onset: 2 years(s) ago. Reports insidious onset without acute precipitating event.  Location of Pain: right foot & left foot; over MTP area, middle of the foot   - left foot MTP joint and arch  - right foot more arch pain    Worsened by: walking, standing   Better with: better shoe wear, massaging, CBD lotion   Treatments tried: rest/activity avoidance, casting/splinting/bracing and massage, gel shoe inserts   Associated symptoms: swelling, numbness, tingling and cracking noises (while walking, over sesmoids)    Right Hip:  Onset: 1 years(s) ago. Reports insidious onset without acute precipitating event.  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    REVIEW OF SYSTEMS:  Review of Systems  Skin: no bruising, no swelling  Musculoskeletal: as above  Neurologic: no  numbness, paresthesias  Remainder of review of systems is negative including constitutional, CV, pulmonary, GI, except as noted in HPI or medical history.    OBJECTIVE:  /80   Pulse 64   Wt 92.1 kg (203 lb)   BMI 32.03 kg/m     General: healthy, alert and in no distress  HEENT: no scleral icterus or conjunctival erythema  Skin: no suspicious lesions or rash. No jaundice.  CV: distal perfusion intact  Resp: normal respiratory effort without conversational dyspnea   Psych: normal mood and affect  Gait: normal steady gait with appropriate coordination and balance  Neuro: Normal light sensory exam of lower extremity    Bilateral hip exam    Inspection:      no edema or ecchymosis in hip area    Tender:      none right - points to groin    Non Tender:      remainder of the hip area bilateral    ROM:     Full active and passive ROM  bilateral    Strength:      flexion 5/5 bilateral       abduction 5-/5 right       adduction 5/5 bilateral    Sensation:      grossly intact in hip and thigh    Special Tests:      neg (-) JANAY right       positive (+) FADIR right    Bilateral Foot and Ankle Exam:    Inspection:       no visible ecchymosis       no visible edema or effusion    Foot inspection:       no deformity noted       ankle pronation    Tender:       None - point to arch bilaterally and MTP joints on the left    Non-Tender:       remainder of ankle and foot bilateral    ROM:        Full active and passive ROM, ankle dorsiflexion, plantarflexion, inversion, eversion, great toe dorsiflexion, remainder of toes, midfoot and subtalar bilateral    Strength:       ankle dorsiflexion 5/5 bilateral       plantarflexion 5/5 bilateral       inversion 5/5 bilateral       eversion 5/5 bilateral    Special Tests:       neg (-) anterior drawer bilateral       neg (-) talar tilt bilateral    Gait:       normal    Lower extremity alignment:       Normal    Flexibility:       Ankle dorsiflexion (with leg extended) 0 degrees  bilateral    Proprioception       normal    Neurovascular:       2+ peripheral pulses bilaterally and brisk capillary refill       sensation grossly intact      RADIOLOGY:  I independently ordered, visualized and reviewed these images with the patient  3 XR views of bilateral feet reviewed: no acute bony abnormality, no significant degenerative change  - will follow official read    AP Pelvis and right lateral XR views of hips reviewed: bilateral CAM deformities, no significant degenerative change  - will follow official read      Review of the result(s) of each unique test - XR             Again, thank you for allowing me to participate in the care of your patient.        Sincerely,        Kailee Diez MD

## 2022-08-02 ENCOUNTER — OFFICE VISIT (OUTPATIENT)
Dept: FAMILY MEDICINE | Facility: CLINIC | Age: 47
End: 2022-08-02
Payer: COMMERCIAL

## 2022-08-02 VITALS
BODY MASS INDEX: 31.17 KG/M2 | WEIGHT: 198.6 LBS | HEIGHT: 67 IN | DIASTOLIC BLOOD PRESSURE: 72 MMHG | SYSTOLIC BLOOD PRESSURE: 126 MMHG | TEMPERATURE: 97.5 F | HEART RATE: 77 BPM | OXYGEN SATURATION: 98 % | RESPIRATION RATE: 14 BRPM

## 2022-08-02 DIAGNOSIS — E78.5 HYPERLIPIDEMIA LDL GOAL <100: ICD-10-CM

## 2022-08-02 DIAGNOSIS — I83.811 VARICOSE VEINS OF LEG WITH PAIN, RIGHT: ICD-10-CM

## 2022-08-02 DIAGNOSIS — R73.9 ELEVATED RANDOM BLOOD GLUCOSE LEVEL: Primary | ICD-10-CM

## 2022-08-02 LAB — HBA1C MFR BLD: 5.6 % (ref 0–5.6)

## 2022-08-02 PROCEDURE — 83036 HEMOGLOBIN GLYCOSYLATED A1C: CPT | Performed by: NURSE PRACTITIONER

## 2022-08-02 PROCEDURE — 36415 COLL VENOUS BLD VENIPUNCTURE: CPT | Performed by: NURSE PRACTITIONER

## 2022-08-02 PROCEDURE — 99213 OFFICE O/P EST LOW 20 MIN: CPT | Performed by: NURSE PRACTITIONER

## 2022-08-02 RX ORDER — VALACYCLOVIR HYDROCHLORIDE 1 G/1
TABLET, FILM COATED ORAL
COMMUNITY
Start: 2022-04-02

## 2022-08-02 ASSESSMENT — PAIN SCALES - GENERAL: PAINLEVEL: NO PAIN (0)

## 2022-08-02 NOTE — PROGRESS NOTES
"  Assessment & Plan     Elevated random blood glucose level  Patient had elevated fasting glucose but A1C is within normal limits.  No further testing needed at this time, recheck glucose in 3 years.  - Hemoglobin A1c; Future  - Hemoglobin A1c    Varicose veins of leg with pain, right  Referral to vascular medicine to discuss current symptoms post surgery.  - Vascular Medicine Referral; Future    Hyperlipidemia LDL goal <100  Cholesterol is elevated.  Handout sent to patient on lowering cholesterol in the diet.  She does not need to start medication but would recommend follow-up in 1 year for recheck on lipids.    See Patient Instructions    Mary Allan NP  [facility] MELODY Wiggins is a 47 year old, presenting for the following health issues:  Results (Go over results from ob/gyn)      History of Present Illness       Reason for visit:  High cholesterol and glucose  Symptoms include:  None  Had these symptoms before:  No    She eats 0-1 servings of fruits and vegetables daily.She consumes 3 sweetened beverage(s) daily.She exercises with enough effort to increase her heart rate 9 or less minutes per day.  She exercises with enough effort to increase her heart rate 3 or less days per week.   She is taking medications regularly.    Review of Systems   CONSTITUTIONAL: NEGATIVE for fever, chills, change in weight  RESP: NEGATIVE for significant cough or SOB  CV: NEGATIVE for chest pain, palpitations or peripheral edema  PSYCHIATRIC: NEGATIVE for changes in mood or affect  ROS otherwise negative      Objective    /72 (BP Location: Right arm, Patient Position: Sitting, Cuff Size: Adult Large)   Pulse 77   Temp 97.5  F (36.4  C) (Tympanic)   Resp 14   Ht 1.695 m (5' 6.75\")   Wt 90.1 kg (198 lb 9.6 oz)   SpO2 98%   BMI 31.34 kg/m    Body mass index is 31.34 kg/m .  Physical Exam   GENERAL: healthy, alert and no distress  RESP: lungs clear to auscultation - no rales, " rhonchi or wheezes  CV: regular rate and rhythm, normal S1 S2, no S3 or S4, no murmur, click or rub, no peripheral edema and peripheral pulses strong  PSYCH: mentation appears normal, affect normal/bright

## 2022-08-02 NOTE — PATIENT INSTRUCTIONS
You need to work on exercise 150 minutes weekly to bring heart rate up over 120.  Lower fat in your diet.  Cholesterol packet given today.  A1C ordered today.  I will notify you of results in the next 1-2 days.  Make appointment with vascular medicine to discuss leg pain returning after surgery.

## 2022-08-16 ENCOUNTER — TELEPHONE (OUTPATIENT)
Dept: ORTHOPEDICS | Facility: CLINIC | Age: 47
End: 2022-08-16

## 2022-08-16 NOTE — TELEPHONE ENCOUNTER
Patient had [place] sports medicine appointment on .    Sent this message: Topic: Non-Medical Question.     I am going to fix it therapy for physical therapy. Dr. Diez wrote me a referral and my insurance needs a out of network [employer] sent to them.  My name is Rosalie Sauceda [facility] [phone]    77. Please let me know if this can be done      Routing to provider/team  Prachi Howell RN on 2022 at 8:16 AM

## 2022-08-17 NOTE — TELEPHONE ENCOUNTER
Called KAVITA Wiggins regarding the physical therapy out of network jose angel she is requesting. I have asked her if she has spoke with the physical therapy facility she is receiving care from if they know more information about this or if her insurance has a specific form that needs to be filled out.  I am not familiar with this, more information is needed.    Lexi Matthews ATC, CSCS  Dr. Diez's Clinical Coordinator  [facility] Sports Medicine Team

## 2022-08-18 NOTE — TELEPHONE ENCOUNTER
Patient LVM returning Lexi's call.  Patient has questions regarding a referral.    Diana Orellana, ATC

## 2022-08-19 NOTE — TELEPHONE ENCOUNTER
Called and spoke with patient.  She notes that her insurance is requesting our office to fill out forms for out of network physical therapy visits.  I explained that this is not a common practice however she can send us the forms to look at.  A MyChart was sent to the patient so she could attach them to a message.  She has a physical therapy appointment this upcoming Monday.  I explained that Dr. Diez is not currently in the office so the forms will be addressed early next week.  Patient expressed understanding.    Diana Orellana ATC

## 2022-08-31 NOTE — TELEPHONE ENCOUNTER
Medical PA department LVM requesting clinical/ office notes as well as Fit it PT tax Id ( if available)    States she needs to get a PA for this location due to it being an out of network request.    Please send information to:  Fax# [phone]  Or email to [email or web address]

## 2022-09-01 NOTE — TELEPHONE ENCOUNTER
Requested documents are faxed to [employer];  # [phone]    Lexi Matthews ATC, CSCS  Dr. Diez's Clinical Coordinator  [facility] Team

## 2022-09-20 ENCOUNTER — OFFICE VISIT (OUTPATIENT)
Dept: VASCULAR SURGERY | Facility: CLINIC | Age: 47
End: 2022-09-20
Attending: NURSE PRACTITIONER
Payer: COMMERCIAL

## 2022-09-20 DIAGNOSIS — I83.813 VARICOSE VEINS OF LEG WITH PAIN, BILATERAL: Primary | ICD-10-CM

## 2022-09-20 DIAGNOSIS — I83.811 VARICOSE VEINS OF LEG WITH PAIN, RIGHT: ICD-10-CM

## 2022-09-20 PROCEDURE — 99214 OFFICE O/P EST MOD 30 MIN: CPT | Performed by: SPECIALIST

## 2022-09-20 NOTE — LETTER
9/20/2022         RE: Rosalie Sauceda  [address]  [facility] [address]        Dear Colleague,    Thank you for referring your patient, Rosalie Sauceda, to the [facility] [place]. Please see a copy of my visit note below.    Consult requested by Mary Allan    Reason for consultation: Varicose veins and pain    HPI:  Patient is a 47-year-old white female presenting with a many year history of bilateral lower extremity varicose veins.  She was last treated with what sounds like a stab phlebectomy in her right lower extremity about 5 to 6 years ago.  She now returns with new onset veins in both her lower extremities and reports pain to the end the day.  She works as a [employer] standing for long periods of time.  She has worn compression over the winter and into the spring for several months without relief of her symptoms.  Her right leg is worse than her left.  She denies any leg trauma, DVT, superficial phlebitis or nonhealing wounds.  She has not had a recent ultrasound.  She now presents to me for evaluation of her varicose veins.    Past Medical History:   Diagnosis Date     Lumbago     back injury in the past     Past Surgical History:   Procedure Laterality Date     C/SECTION, LOW TRANSVERSE  6/2006     LIGATN/STRIP LONG & SHORT SAPHEN  7/2007     Current Outpatient Medications   Medication     acetaminophen (TYLENOL) 500 MG tablet     valACYclovir (VALTREX) 1000 mg tablet     No current facility-administered medications for this visit.        Allergies   Allergen Reactions     Amoxicillin      Pcn [Penicillins]      Social History     Socioeconomic History     Marital status:      Spouse name: Not on file     Number of children: 2     Years of education: Not on file     Highest education level: Not on file   Occupational History     Employer: [employer]   Tobacco Use     Smoking status: Former Smoker     Packs/day: 1.00     Years: 12.00     Pack years: 12.00      Types: Cigarettes     Smokeless tobacco: Never Used   Vaping Use     Vaping Use: Every day     Substances: Nicotine, Flavoring     Devices: Disposable   Substance and Sexual Activity     Alcohol use: Yes     Comment: 10 drinks per month- or less.     Drug use: No     Sexual activity: Yes     Partners: Male     Birth control/protection: I.U.D.   Other Topics Concern      Service No     Blood Transfusions No     Caffeine Concern No     Occupational Exposure No     Hobby Hazards No     Sleep Concern No     Stress Concern No     Weight Concern No     Special Diet No     Back Care Yes     Comment: Occasional chiropractor     Exercise No     Bike Helmet No     Seat Belt No     Self-Exams No     Parent/sibling w/ CABG, MI or angioplasty before 65F 55M? No   Social History Narrative     Not on file     Social Determinants of Health     Financial Resource Strain: Not on file   Food Insecurity: Not on file   Transportation Needs: Not on file   Physical Activity: Not on file   Stress: Not on file   Social Connections: Not on file   Intimate Partner Violence: Not on file   Housing Stability: Not on file     Family History   Problem Relation Age of Onset     Cancer Father         skin ca     Diabetes Maternal Grandmother      Breast Cancer Maternal Grandmother         Dx in her 70's     Hypertension Maternal Grandmother      Cancer Maternal Grandfather         LUNG     Prostate Cancer Maternal Grandfather       ROS: 10 point ROS neg other than the symptoms noted above in the HPI.    PE:  B/P: Data Unavailable, T: Data Unavailable, P: Data Unavailable, R: Data Unavailable  General: well developed, well nourished WF who appears their stated age  HEENT: NC/AT, EOMI, (-)icterus, (-)injection  Neck: Supple, No JVD  Chest: CTA  Heart: S1, S2, (-)m/r/g  Abd: Soft, non tender, non distended, non tender, no masses  Ext; Warm, no edema, no stasis changes.  Bilateral varicose veins and medial thighs and posterior thighs.  Psych:  AAOx3  Neuro: No focal deficits      Impression/plan:  This is a 47-year-old lady with bilateral symptomatic varicose veins despite compression therapy.  Her right leg is worse than her left.  She is a CEAP 2 bilaterally.  I discussed the etiology of recurrent varicose veins as well as the role of ultrasound and compression therapy.  She is symptomatic despite compression therapy.  After discussion with the patient the plan at this time obtain ultrasound of both lower extremities and proceed based on those findings.  Any further treatment will be determined by the ultrasound findings.  He will follow-up with me after ultrasound.    Reagan Martinez MD, FACS      Again, thank you for allowing me to participate in the care of your patient.        Sincerely,        Reagan Martinez MD

## 2022-09-20 NOTE — NURSING NOTE
Patient Reported symptoms:    Right leg   Heaviness A good bit of the time   Achiness A good bit of the time   Swelling A little of the time   Throbbing None of the time   Itching None of the time   Appearance Slightly noticeable   Impact on work/activities Symptoms but full able to participate    Left Leg   Heaviness A good bit of the time   Achiness A good bit of the time   Swelling A little of the time   Throbbing None of the time   Itching None of the time   Appearance Slightly noticeable   Impact on work/activities Symptoms but full able to participate

## 2022-09-20 NOTE — PATIENT INSTRUCTIONS
Varicose Veins and Spider Veins    Varicose veins are swollen, enlarged veins most often found in the legs. They are usually blue or purple in color and may bulge, twist, and stand out under the skin. Spider veins are small veins just under your skin that can look red, blue or purple.        Normally, veins return blood from the body to the heart. The leg veins have one-way valves that prevent blood from flowing backward in the vein. When the valves are weak or damaged, blood backs up in the veins. This may cause some of the veins to swell and bulge and become varicose veins.     Symptoms  Varicose veins may or may not cause symptoms. If symptoms do occur, they can include:  Legs that feel tired, achy, heavy, or itchy  Leg swelling  Leg muscle cramps  Skin changes, such as discoloration, dryness, redness, or rash (in more severe cases, you may also have sores on the skin called venous leg ulcers)    Risk factors  There are a number of factors that increase the risk for varicose veins. These can include:   Being a woman  Being older  Sitting or standing for long periods  Being overweight  Being pregnant  Having a family history of varicose veins  Hormones, birth control pills    Treatment starts with simple self-help measures (see below). If these don't help, there are many procedures that can be done to shrink or remove varicose veins. Your healthcare provider can tell you more about these options, if needed.     Home care  Support or compression stockings will likely be prescribed. If so, be sure to wear them as directed. They may help improve blood flow.  Exercising helps strengthen your leg muscles and improve blood flow. To get the most benefit, choose exercises such as walking, swimming, or cycling. Also try to exercise for at least 30 minutes on most days.  Raising your legs above heart level will help relieve swelling and keep blood from pooling in veins. Try to elevate your legs for 15 to 20 minutes at  the end of the day, and whenever you're relaxing. To make sure your legs are raised above heart level, prop them up on cushions or large pillows.  To keep blood moving when you have to sit or stand for long periods, try these tips:  At work, take walking breaks instead of coffee breaks. Walk during your lunch hour. Or try flexing your feet up and down 10 times each hour.  When standing, raise yourself up and down on your toes, or rock back and forth on your heels.  If you are overweight, talk with your healthcare provider about setting up a weight-loss plan. Maintaining a healthy weight can help reduce the strain on your veins. It may also improve symptoms, such as swelling and aching.  If you have dryness and itching, ask your provider about special lotions that can be applied to the skin to help improve symptoms.     Follow-up care  Follow up with your healthcare provider, or as directed. If imaging tests were done, you'll be told the results and if there are any new findings that affect your care.     When to seek medical advice  Call your healthcare provider right away if any of these occur:  Sudden, severe leg swelling, pain, or redness  Symptoms worsen, or they don't improve with self-care  Bleeding from any affected veins  Ulcers form on the legs, ankles, or feet  Fever of 100.4 F (38 C) or higher, or as advised by your provider    [place] last reviewed this educational content on 4/1/2018 2000-2021 [employer]. All rights reserved. This information is not intended as a substitute for professional medical care. Always follow your healthcare professional's instructions.    Self-Care for Spider and Varicose Veins    Your healthcare provider may suggest that you try self-care. Exercising and maintaining a healthy weight may keep problem veins from getting worse. Wearing elastic stockings and elevating your legs can help improve blood flow. Taking breaks when you sit or stand helps,  too.        Wearing compression stockings  Compression stockings gently squeeze veins so blood flows upward. If you need compression stockings, your healthcare provider can prescribe them for you. Follow your healthcare provider's advice about how and when to wear them. Compression stockings come in several different levels of pressure. Ask your healthcare provider which level of pressure would help you the most.     Raising your legs above heart level will help relieve swelling and keep blood from pooling in veins. Try to elevate your legs for 15 to 20 minutes at the end of the day, and whenever you're relaxing. To make sure your legs are raised above heart level, prop them up on cushions or large pillows.    To keep blood moving when you have to sit or stand for long periods, try these tips:  At work, take walking breaks instead of coffee breaks. Walk during your lunch hour. Or try flexing your feet up and down 10 times each hour.  When standing, raise yourself up and down on your toes, or rock back and forth on your heels.    [employer] last reviewed this educational content on 11/1/2019 2000-2021 [employer]. All rights reserved. This information is not intended as a substitute for professional medical care. Always follow your healthcare professional's instructions.    Treatment Options    Sclerotherapy  Your healthcare provider will inject the vein with a special chemical that will quickly close the vein from the inside. This is particularly useful for spider veins and smaller varicose veins.      If you have large varicose veins, surgery may be the best choice. But it will not prevent new varicose veins from forming. Surgery is most often done in a surgery center or in the office.    If surgery is recommended for you, your surgery will be tailored to your needs. Varicose veins may be tied off (ligation), destroyed, or removed. Blood will then flow through the healthy veins. One or more of the  following techniques may be used:    Ablation (laser or radiofrequency)  A tiny cut in the skin is made near the varicose vein. A small tube called a catheter is inserted into the vein. Energy or heat released from the catheter tip will make the vein walls collapse and stick together, stopping all blood flow through the vein.         Ablation (glue)  A tiny cut in the skin is made near the varicose vein. A small tube called a catheter is inserted into the vein. Droplets of glue are deposited into the vein to make vein walls collapse and stick together stopping blood flow through the vein.    Microphlebectomy or ambulatory phlebectomy  A special hook is used to gently take out a varicose vein through tiny incisions. Microphlebectomy may be done in your healthcare provider's office.      Vein stripping and ligation (rare)  In more severe cases, the surgeon may tie off and remove veins by making smaller cuts in the skin. Smaller branching veins may also be tied off or removed.    Know about the risks  Your healthcare provider will talk with you about the risks of surgery. These include:  Bleeding or swelling  A sense of numbness, burning, or tingling in areas near the procedure  Edema or swelling in the legs  Clots in the deep veins that may travel to the lungs  Infection  Scarring  Inflammation related to the glue    [employer] last reviewed this educational content on 11/1/2019 (Sclerotherapy image) 12/1/2019 (Radiofrequency ablation image, Microphlebectomy image)    6925-7237 [employer]. All rights reserved. This information is not intended as a substitute for professional medical care. Always follow your healthcare professional's instructions.

## 2022-09-20 NOTE — PROGRESS NOTES
Consult requested by Mary Allan    Reason for consultation: Varicose veins and pain    HPI:  Patient is a 47-year-old white female presenting with a many year history of bilateral lower extremity varicose veins.  She was last treated with what sounds like a stab phlebectomy in her right lower extremity about 5 to 6 years ago.  She now returns with new onset veins in both her lower extremities and reports pain to the end the day.  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  She has worn compression over the winter and into the spring for several months without relief of her symptoms.  Her right leg is worse than her left.  She denies any leg trauma, DVT, superficial phlebitis or nonhealing wounds.  She has not had a recent ultrasound.  She now presents to me for evaluation of her varicose veins.    Past Medical History:   Diagnosis Date     Lumbago     back injury in the past     Past Surgical History:   Procedure Laterality Date     C/SECTION, LOW TRANSVERSE  6/2006     LIGATN/STRIP LONG & SHORT SAPHEN  7/2007     Current Outpatient Medications   Medication     acetaminophen (TYLENOL) 500 MG tablet     valACYclovir (VALTREX) 1000 mg tablet     No current facility-administered medications for this visit.        Allergies   Allergen Reactions     Amoxicillin      Pcn [Penicillins]      Social History     Socioeconomic History     Marital status:      Spouse name: Not on file     Number of children: 2     Years of education: Not on file     Highest education level: Not on file   Occupational History     Employer: [employer]   Tobacco Use     Smoking status: Former Smoker     Packs/day: 1.00     Years: 12.00     Pack years: 12.00     Types: Cigarettes     Smokeless tobacco: Never Used   Vaping Use     Vaping Use: Every day     Substances: Nicotine, Flavoring     Devices: Disposable   Substance and Sexual Activity     Alcohol use: Yes     Comment: 10 drinks per month- or less.     Drug use:  No     Sexual activity: Yes     Partners: Male     Birth control/protection: I.U.D.   Other Topics Concern      Service No     Blood Transfusions No     Caffeine Concern No     Occupational Exposure No     Hobby Hazards No     Sleep Concern No     Stress Concern No     Weight Concern No     Special Diet No     Back Care Yes     Comment: Occasional chiropractor     Exercise No     Bike Helmet No     Seat Belt No     Self-Exams No     Parent/sibling w/ CABG, MI or angioplasty before 65F 55M? No   Social History Narrative     Not on file     Social Determinants of Health     Financial Resource Strain: Not on file   Food Insecurity: Not on file   Transportation Needs: Not on file   Physical Activity: Not on file   Stress: Not on file   Social Connections: Not on file   Intimate Partner Violence: Not on file   Housing Stability: Not on file     Family History   Problem Relation Age of Onset     Cancer Father         skin ca     Diabetes Maternal Grandmother      Breast Cancer Maternal Grandmother         Dx in her 70's     Hypertension Maternal Grandmother      Cancer Maternal Grandfather         LUNG     Prostate Cancer Maternal Grandfather       ROS: 10 point ROS neg other than the symptoms noted above in the HPI.    PE:  B/P: Data Unavailable, T: Data Unavailable, P: Data Unavailable, R: Data Unavailable  General: well developed, well nourished WF who appears their stated age  HEENT: NC/AT, EOMI, (-)icterus, (-)injection  Neck: Supple, No JVD  Chest: CTA  Heart: S1, S2, (-)m/r/g  Abd: Soft, non tender, non distended, non tender, no masses  Ext; Warm, no edema, no stasis changes.  Bilateral varicose veins and medial thighs and posterior thighs.  Psych: AAOx3  Neuro: No focal deficits      Impression/plan:  This is a 47-year-old lady with bilateral symptomatic varicose veins despite compression therapy.  Her right leg is worse than her left.  She is a CEAP 2 bilaterally.  I discussed the etiology of recurrent  varicose veins as well as the role of ultrasound and compression therapy.  She is symptomatic despite compression therapy.  After discussion with the patient the plan at this time obtain ultrasound of both lower extremities and proceed based on those findings.  Any further treatment will be determined by the ultrasound findings.  He will follow-up with me after ultrasound.    Reagan Martinez MD, FACS

## 2022-09-27 ENCOUNTER — ANCILLARY PROCEDURE (OUTPATIENT)
Dept: ULTRASOUND IMAGING | Facility: CLINIC | Age: 47
End: 2022-09-27
Attending: SPECIALIST
Payer: COMMERCIAL

## 2022-09-27 DIAGNOSIS — I83.811 VARICOSE VEINS OF LEG WITH PAIN, RIGHT: ICD-10-CM

## 2022-09-27 PROCEDURE — 93970 EXTREMITY STUDY: CPT | Performed by: SPECIALIST

## 2022-10-11 ENCOUNTER — VIRTUAL VISIT (OUTPATIENT)
Dept: VASCULAR SURGERY | Facility: CLINIC | Age: 47
End: 2022-10-11
Attending: SPECIALIST
Payer: COMMERCIAL

## 2022-10-11 ENCOUNTER — OFFICE VISIT (OUTPATIENT)
Dept: DERMATOLOGY | Facility: CLINIC | Age: 47
End: 2022-10-11
Attending: OBSTETRICS & GYNECOLOGY
Payer: COMMERCIAL

## 2022-10-11 DIAGNOSIS — L57.0 ACTINIC KERATOSIS: Primary | ICD-10-CM

## 2022-10-11 DIAGNOSIS — I83.811 VARICOSE VEINS OF LEG WITH PAIN, RIGHT: ICD-10-CM

## 2022-10-11 DIAGNOSIS — L82.1 SEBORRHEIC KERATOSIS: ICD-10-CM

## 2022-10-11 DIAGNOSIS — L81.4 LENTIGO: ICD-10-CM

## 2022-10-11 DIAGNOSIS — D22.9 MULTIPLE BENIGN NEVI: ICD-10-CM

## 2022-10-11 DIAGNOSIS — I83.813 VARICOSE VEINS OF LEG WITH PAIN, BILATERAL: Primary | ICD-10-CM

## 2022-10-11 DIAGNOSIS — D18.01 CHERRY ANGIOMA: ICD-10-CM

## 2022-10-11 PROCEDURE — 99213 OFFICE O/P EST LOW 20 MIN: CPT | Mod: 95 | Performed by: SPECIALIST

## 2022-10-11 PROCEDURE — 99203 OFFICE O/P NEW LOW 30 MIN: CPT | Mod: 25 | Performed by: PHYSICIAN ASSISTANT

## 2022-10-11 PROCEDURE — 17000 DESTRUCT PREMALG LESION: CPT | Performed by: PHYSICIAN ASSISTANT

## 2022-10-11 ASSESSMENT — PAIN SCALES - GENERAL: PAINLEVEL: NO PAIN (0)

## 2022-10-11 NOTE — PROGRESS NOTES
October 11, 2022    Vein Procedure Recommendation    Called and spoke with patient.    Dr. Martinez has recommended patient to have the following vein procedure(s):     1. Right leg VNUS closure GSV    2. Bilateral leg Phlebectomies (medically necessary)     **Both procedures done the same day*    Patient Pre-op Questions:  Preferred Pharmacy: [facility] in [place]  Anticoagulant/ASA: no  Artificial Joint or Heart Valve: no  Open ulcer: no  Sedation nausea: no      Patient is recommended to wear Thigh High compression hose following her procedure. Discussed compression hose. Explained to patient if insurance doesn't cover the compression hose there are a couple different options to getting them and to call the clinic to let us know if they need help.    Entered in patient's After Visit Summary (viewable in [employer]) written procedure instructions to review on her own (see After Visit Summary).      Next steps:    Insurance Submission  Informed patient this process could take up to 14 business days, but once approved, the patient will be contacted by our surgery scheduler to schedule the above procedure. Gave patient our surgery scheduler's information.    Patient is in agreement with all of the above and has no further questions at this time.    Naty Rose RN  [facility]  Vein Clinic

## 2022-10-11 NOTE — NURSING NOTE
Chief Complaint   Patient presents with     Skin Check     Right breast and left nsw       There were no vitals filed for this visit.  Wt Readings from Last 1 Encounters:   08/02/22 90.1 kg (198 lb 9.6 oz)       Lupe Wilson LPN .................10/11/2022

## 2022-10-11 NOTE — LETTER
10/11/2022         RE: Rosalie Sauceda  [address]  [facility] [address]        Dear Colleague,    Thank you for referring your patient, Rosalie Sauceda, to the [facility] [place]. Please see a copy of my visit note below.    Rosalie is a 47 year old who is being evaluated via a billable video visit.      How would you like to obtain your AVS? MyChart  If the video visit is dropped, the invitation should be resent by: Text to cell phone: [phone]  Will anyone else be joining your video visit? No            Video-Visit Details    Video Start Time: 9:03 AM    Type of service:  Video Visit    F/U US        Subjective:  Patient is a 47-year-old white female presenting with a many year history of bilateral lower extremity varicose veins.  She was last treated with what sounds like a stab phlebectomy in her right lower extremity about 5 to 6 years ago.  She now returns with new onset veins in both her lower extremities and reports pain to the end the day.  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  She has worn compression over the winter and into the spring for several months without relief of her symptoms.  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  Her right leg is worse than her left.  She denies any leg trauma, DVT, superficial phlebitis or nonhealing wounds.  She has not had a recent ultrasound.  She now presents to me for evaluation of her varicose veins.      Objective:   Ext; Warm, no edema, no stasis changes.  Bilateral varicose veins and medial thighs and posterior thighs.  Scattered bilateral spider vein    US -   Name:  Rosalie Sauceda                                                       Patient ID: [id number]  Date: 2022                                                     : 1975  Sex: female                                                                 Examined by: JOSE Martin RVT  Age:  47 year  old                                                         Reading MD: Reagan Martinez MD     INDICATION:  Bilateral varicose veins with pain; hx of stab phlebs in right leg 5-6 years ago     EXAM TYPE  BILATERAL LOWER EXTREMITY VENOUS DUPLEX FOR VENOUS INSUFFICIENCY  TECHNICAL SUMMARY     A duplex ultrasound study using color flow was performed, to evaluate the bilateral lower extremity veins for valvular incompetence with the patient in a steep reversed trendelenberg.      RIGHT:     The deep veins demonstrate phasic flow, compress and respond to augmentations.  There is no reflux or DVT.       The GSV demonstrates phasic flow, compresses and responds to augmentations from the saphenofemoral junction to the ankle with no evidence of  thrombus. The great saphenous vein measures 6.6 mm at the saphenofemoral junction, 4.1 mm at the proximal thigh and is varicosed at the knee. The GSV is incompetent from the proximal to mid thigh, with the greatest reflux time of 6021 milliseconds.  The GSV is varicosed (3.9 mm)  from the distal thigh to knee with a reflux time of 6871 milliseconds.     The AASV is not visualized.      The Giacomini vein is competent( 2.0 mm) communicating with the small saphenous vein at the knee level.      The SSV demonstrates phasic flow, compresses and responds to augmentations from the popliteal space to the ankle.  No reflux or thrombus is seen. The saphenopopliteal junction is absent. The SSV is incompetent at the mid calf with a reflux time of 2178 milliseconds.       Perforators: there is no evidence of incompetent  veins at any level.         LEFT:     The deep veins demonstrate phasic flow, compress and respond to augmentations.  There is no  DVT.  The common femoral vein is incompetent and free of thrombus. The remaining deep veins are competent and free of thrombus.      The GSV demonstrates phasic flow, compresses and responds to augmentations from the saphenofemoral  junction to the ankle with no evidence of thrombus. The great saphenous vein measures 7.8 mm at the saphenofemoral junction, 5.2 mm at the proximal thigh and 5.3 mm at the knee. The GSV courses superficial from the mid thigh to the mid calf.  The GSV is incompetent from SFJ to Proximal Thigh, with the greatest reflux time of 2854 milliseconds.       The AASV is competent ( 4.2 mm) draining into the saphenofemoral junction.      The Giacomini vein is competent ( 2.0 mm) communicating with the small saphenous vein at the knee level.      The SSV demonstrates phasic flow, compresses and responds to augmentations from the popliteal space to the ankle.  No reflux or thrombus is seen. The saphenopopliteal junction is absent.      Perforators: there is no evidence of incompetent  veins at any level.      FINAL SUMMARY:     1.  There is no evidence of DVT  2.  Left common femoral vein incompetence  3.  The right GSV is incompetent to the mid thigh with a varicose vein, segment distal thigh to knee.         4.  The left GSV is incompetent in the proximal thigh.  The remainder of the GSV is competent.  5.  The time of incompetence is greater than 500 milliseconds in the superficial and  veins and greater than 1000 milliseconds in the deep veins.      Reagan Martinez MD, FACS    Assessment/plan:  This is a 47-year-old lady with bilateral symptomatic varicose veins despite compression therapy.  Her right leg is worse than her left.  She is a CEAP 2 bilaterally.  I discussed the etiology of recurrent varicose veins as well as the role of ultrasound and compression therapy.  She is symptomatic despite compression therapy.  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  Her ultrasound revealed right GSV reflux feeding a large varicose segment and only minimal very short segment reflux on the left.  Based on her ultrasound she is a candidate for a right GSV V RF ablation with bilateral  stab phlebectomie.   The procedure, risks, benefits, and alternatives were discussed and the patient agrees to proceed.  She will be scheduled once insurance approval is obtained.        Reagan Martinez MD, FACS          Video End Time:9:19 AM    Originating Location (pt. Location): Home    Distant Location (provider location):  [facility] [place]     Platform used for Video Visit: Kimo        Again, thank you for allowing me to participate in the care of your patient.        Sincerely,        Reagan Martinez MD

## 2022-10-11 NOTE — PROGRESS NOTES
Rosalie is a 47 year old who is being evaluated via a billable video visit.      How would you like to obtain your AVS? MyChart  If the video visit is dropped, the invitation should be resent by: Text to cell phone: [phone]  Will anyone else be joining your video visit? No            Video-Visit Details    Video Start Time: 9:03 AM    Type of service:  Video Visit    F/U US        Subjective:  Patient is a 47-year-old white female presenting with a many year history of bilateral lower extremity varicose veins.  She was last treated with what sounds like a stab phlebectomy in her right lower extremity about 5 to 6 years ago.  She now returns with new onset veins in both her lower extremities and reports pain to the end the day.  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  She has worn compression over the winter and into the spring for several months without relief of her symptoms.  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  Her right leg is worse than her left.  She denies any leg trauma, DVT, superficial phlebitis or nonhealing wounds.  She has not had a recent ultrasound.  She now presents to me for evaluation of her varicose veins.      Objective:   Ext; Warm, no edema, no stasis changes.  Bilateral varicose veins and medial thighs and posterior thighs.  Scattered bilateral spider vein    US -   Name:  Rosalie Sauceda                                                       Patient ID: [id number]  Date: 2022                                                     : 1975  Sex: female                                                                 Examined by: JOSE Martin RVT  Age:  47 year old                                                         Reading MD: Reagan Martinez MD     INDICATION:  Bilateral varicose veins with pain; hx of stab phlebs in right leg 5-6 years ago     EXAM TYPE  BILATERAL LOWER EXTREMITY VENOUS DUPLEX FOR VENOUS  INSUFFICIENCY  TECHNICAL SUMMARY     A duplex ultrasound study using color flow was performed, to evaluate the bilateral lower extremity veins for valvular incompetence with the patient in a steep reversed trendelenberg.      RIGHT:     The deep veins demonstrate phasic flow, compress and respond to augmentations.  There is no reflux or DVT.       The GSV demonstrates phasic flow, compresses and responds to augmentations from the saphenofemoral junction to the ankle with no evidence of  thrombus. The great saphenous vein measures 6.6 mm at the saphenofemoral junction, 4.1 mm at the proximal thigh and is varicosed at the knee. The GSV is incompetent from the proximal to mid thigh, with the greatest reflux time of 6021 milliseconds.  The GSV is varicosed (3.9 mm)  from the distal thigh to knee with a reflux time of 6871 milliseconds.     The AASV is not visualized.      The Giacomini vein is competent( 2.0 mm) communicating with the small saphenous vein at the knee level.      The SSV demonstrates phasic flow, compresses and responds to augmentations from the popliteal space to the ankle.  No reflux or thrombus is seen. The saphenopopliteal junction is absent. The SSV is incompetent at the mid calf with a reflux time of 2178 milliseconds.       Perforators: there is no evidence of incompetent  veins at any level.         LEFT:     The deep veins demonstrate phasic flow, compress and respond to augmentations.  There is no  DVT.  The common femoral vein is incompetent and free of thrombus. The remaining deep veins are competent and free of thrombus.      The GSV demonstrates phasic flow, compresses and responds to augmentations from the saphenofemoral junction to the ankle with no evidence of thrombus. The great saphenous vein measures 7.8 mm at the saphenofemoral junction, 5.2 mm at the proximal thigh and 5.3 mm at the knee. The GSV courses superficial from the mid thigh to the mid calf.  The GSV is  incompetent from SFJ to Proximal Thigh, with the greatest reflux time of 2854 milliseconds.       The AASV is competent ( 4.2 mm) draining into the saphenofemoral junction.      The Giacomini vein is competent ( 2.0 mm) communicating with the small saphenous vein at the knee level.      The SSV demonstrates phasic flow, compresses and responds to augmentations from the popliteal space to the ankle.  No reflux or thrombus is seen. The saphenopopliteal junction is absent.      Perforators: there is no evidence of incompetent  veins at any level.      FINAL SUMMARY:     1.  There is no evidence of DVT  2.  Left common femoral vein incompetence  3.  The right GSV is incompetent to the mid thigh with a varicose vein, segment distal thigh to knee.         4.  The left GSV is incompetent in the proximal thigh.  The remainder of the GSV is competent.  5.  The time of incompetence is greater than 500 milliseconds in the superficial and  veins and greater than 1000 milliseconds in the deep veins.      Reagan Martinez MD, FACS    Assessment/plan:  This is a 47-year-old lady with bilateral symptomatic varicose veins despite compression therapy.  Her right leg is worse than her left.  She is a CEAP 2 bilaterally.  I discussed the etiology of recurrent varicose veins as well as the role of ultrasound and compression therapy.  She is symptomatic despite compression therapy.  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  Her ultrasound revealed right GSV reflux feeding a large varicose segment and only minimal very short segment reflux on the left.  Based on her ultrasound she is a candidate for a right GSV V RF ablation with bilateral stab phlebectomie.   The procedure, risks, benefits, and alternatives were discussed and the patient agrees to proceed.  She will be scheduled once insurance approval is obtained.        Reagan Martinez MD, FACS          Video End Time:9:19  AM    Originating Location (pt. Location): Home    Distant Location (provider location):  [facility]     Platform used for Video Visit: Kimo

## 2022-10-11 NOTE — LETTER
10/11/2022         RE: Rosalie Sauceda  [address] [facility] [address]        Dear Colleague,    Thank you for referring your patient, Rosalie Sauceda, to the [facility]. Please see a copy of my visit note below.    Rosalie Sauceda is an extremely pleasant 47 year old year old female patient here today for skin check. She notes rough area by left side of nose. She notes present for a few months. No pain or bleeding. She also notes brown spot on right breast, present for years, maybe peeling.   Patient has no other skin complaints today.  Remainder of the HPI, Meds, PMH, Allergies, FH, and SH was reviewed in chart.    Pertinent Hx:  No personal history of skin cancer.   Past Medical History:   Diagnosis Date     Lumbago     back injury in the past       Past Surgical History:   Procedure Laterality Date     C/SECTION, LOW TRANSVERSE  6/2006     LIGATN/STRIP LONG & SHORT SAPHEN  7/2007        Family History   Problem Relation Age of Onset     Cancer Father         skin ca     Diabetes Maternal Grandmother      Breast Cancer Maternal Grandmother         Dx in her 70's     Hypertension Maternal Grandmother      Cancer Maternal Grandfather         LUNG     Prostate Cancer Maternal Grandfather        Social History     Socioeconomic History     Marital status:      Spouse name: Not on file     Number of children: 2     Years of education: Not on file     Highest education level: Not on file   Occupational History     Employer: [employer]   Tobacco Use     Smoking status: Former     Packs/day: 1.00     Years: 12.00     Pack years: 12.00     Types: Cigarettes     Smokeless tobacco: Never   Vaping Use     Vaping Use: Every day     Substances: Nicotine, Flavoring     Devices: Disposable   Substance and Sexual Activity     Alcohol use: Yes     Comment: 10 drinks per month- or less.     Drug use: No     Sexual activity: Yes     Partners: Male     Birth control/protection: I.U.D.    Other Topics Concern      Service No     Blood Transfusions No     Caffeine Concern No     Occupational Exposure No     Hobby Hazards No     Sleep Concern No     Stress Concern No     Weight Concern No     Special Diet No     Back Care Yes     Comment: Occasional chiropractor     Exercise No     Bike Helmet No     Seat Belt No     Self-Exams No     Parent/sibling w/ CABG, MI or angioplasty before 65F 55M? No   Social History Narrative     Not on file     Social Determinants of Health     Financial Resource Strain: Not on file   Food Insecurity: Not on file   Transportation Needs: Not on file   Physical Activity: Not on file   Stress: Not on file   Social Connections: Not on file   Intimate Partner Violence: Not on file   Housing Stability: Not on file       Outpatient Encounter Medications as of 10/11/2022   Medication Sig Dispense Refill     acetaminophen (TYLENOL) 500 MG tablet Take 500-1,000 mg by mouth every 6 hours as needed for mild pain (Patient not taking: No sig reported)       valACYclovir (VALTREX) 1000 mg tablet TAKE 1 TABLET BY MOUTH TWICE DAILY FOR 3 DAYS (Patient not taking: No sig reported)       No facility-administered encounter medications on file as of 10/11/2022.             O:   NAD, WDWN, Alert & Oriented, Mood & Affect wnl, Vitals stable   Here today alone   There were no vitals taken for this visit.   General appearance normal   Vitals stable   Alert, oriented and in no acute distress    Gritty papule on left NSW   Stuck on papules and brown macules on trunk and ext   Red papules on trunk  Brown papules and macules with regular pigment network and borders on torso and extremities     The remainder of skin exam is normal      Eyes: Conjunctivae/lids:Normal     ENT: Lips: normal    MSK:Normal    Cardiovascular: peripheral edema none    Pulm: Breathing Normal    Neuro/Psych: Orientation:Alert and Orientedx3 ; Mood/Affect:normal     A/P:  1. Actinic keratosis on left NSW x 1  LN2:   Treated with LN2 for 5s for 1-2 cycles. Warned risks of blistering, pain, pigment change, scarring, and incomplete resolution.  Advised patient to return if lesions do not completely resolve.  Wound care sheet given.  2. Seborrheic keratosis, lentigo, angioma, benign nevi   It was a pleasure speaking to Rosalie MOORE Sohail today.  BENIGN LESIONS DISCUSSED WITH PATIENT:  I discussed the specifics of tumor, prognosis, and genetics of benign lesions.  I explained that treatment of these lesions would be purely cosmetic and not medically neccessary.  I discussed with patient different removal options including excision, cautery and /or laser.      Nature and genetics of benign skin lesions dicussed with patient.  Signs and Symptoms of skin cancer discussed with patient.  ABCDEs of melanoma reviewed with patient.  Patient encouraged to perform monthly skin exams.  UV precautions reviewed with patient.  Risks of non-melanoma skin cancer discussed with patient   Return to clinic in one year or sooner if needed.       Again, thank you for allowing me to participate in the care of your patient.        Sincerely,        Cheryl Wagner PA-C

## 2022-10-11 NOTE — PATIENT INSTRUCTIONS
Pre-Procedure Instructions:                           VNUS Closure and Phlebectomies  You are having a Closure(s) - where one or more veins are closed and Phlebectomies - where one or more veins are removed.    Insurance  Precertification and/or referral authorization may be required by your insurance company.  We will call your insurance company to verify benefits for the medically necessary part of your procedure.    Your Current Medications and Allergies  To reduce bruising, please do not take aspirin-type medications (Motrin, Aleve, Ibuprofen, Advil, etc.) for three days before your procedure. You may take Tylenol if you need a pain reliever.  Are you on blood thinner medications? (Plavix, Coumadin, Eliquis, Xarelto) Please discuss this with your surgeon. You may resume taking your blood thinner medication after your procedure.  Are you sensitive to latex or adhesives used for fake fingernails? Please let us know!    Driving Escort and   Please arrange to have a trusted adult (18 years old or older) drive you to and from the clinic.  For your safety, we recommend you have a trusted adult to stay with you until the next morning.    Your Health  If you have a change in your health before the procedure, contact our office immediately.   (For example: cold symptoms, cough, urinary tract infection, fever, flu symptoms).  A pre-procedure physical is not required.    Note  It is sometimes necessary to adjust the procedure schedule due to emergencies. We greatly appreciate your flexibility and understanding in this matter.  Compression hose are needed following this procedure.                   Check List: The Morning of Your Procedure  ___1. Please do not put anything on your leg(s) or shave the day of your procedure.  ___2. You may take your normal medications the day of your procedure.  ___3. It is recommended you eat a light breakfast or lunch the day of your procedure.  ___4. Wear comfortable  loose-fitting clothing and wide-fitting shoes (i.e. tennis shoes, slip-ons).  ___5. Please arrive at our clinic at the specified time given by the nurse.  ___6. You will sign an affirmation of informed consent.  ___7. Bring your pre-procedure sedation medication (lorazepam and clonidine) with you to the clinic. One hour              before your procedure, you will be instructed to take these medications. The lorazepam (Ativan) lowers              anxiety and sedates you; the clonidine makes the lorazepam more effective. Everyone's body processes              these medications differently. Therefore, reactions to these medications vary. Some people stay awake and              some people sleep through the whole procedure. You may not remember everything about the procedure              or the day. You do not want to make any big decisions for the rest of the day.     The Day of Your Procedure:       VNUS Closure and Phlebectomies  In the Exam Room  A nurse will bring you back to an exam room with your family member or friend. This is when your informed consent will be signed, and you will take your pre-procedure medications.  You will be asked to remove everything from the waist down, including undergarments. You will then put on a hospital gown or shorts and blue booties.  Your surgeon will come in to answer any questions and kashmir any bulging varicose veins to be removed.  You will be taken to the restroom to empty your bladder before going into the procedure room.    In the Procedure Room  You will be escorted to the procedure room. You will lie on a procedure table covered with a sheet or blanket.  A nurse will put a blood pressure cuff on your arm and a pulse/oxygen monitor on a finger. Your vital signs will be monitored every 15 minutes.  Your gown will be pulled up slightly and the groin exposed for a short period of time. The surgeon's assistant will clean your foot, leg, and groin with an antibacterial  solution. We will get you covered up as quickly as possible!  Sterile towels and blue drapes will be used to cover you and the table. You will be asked to keep your hands under the blue drapes during the procedure.  The lights will be turned down. The table will be tipped so your head is higher than your feet. You may feel like you're going to slide off, but you won't.    The Procedure  The surgeon will visualize your veins with an ultrasound machine. He or she will then numb your skin and access the vein. A catheter is passed up the vein and positioned with ultrasound guidance. The table will then be tipped head down.  Once the catheter is in the correct position, medication will be injected to numb your leg. You will feel some needle sticks and may feel discomfort as the medication goes in. Once this is done, you should not experience significant discomfort. But if you do, please let us know and more numbing medication can be injected. As the catheter sends out heat, the vein closes off and the catheter is withdrawn.  For the phlebectomy part of the procedure, small incisions are made where the bulging varicose veins have been marked on your leg(s) and these veins will be removed using a small katelynn hook instrument.                    VNUS closure                  Phlebectomy    Post-Procedure  Once the procedure is done, your leg(s) will be washed with warm water and dried. Your leg(s) will be bandaged with large soft dressings and a large ace bandage wrapped from toes to groin.   You will be offered something to drink and a light snack.  You will rest with your leg(s) elevated for approximately 30 minutes. Your friend or family member may join you.  For your safety, you will be taken to your car in a wheelchair. If you are able to, it is good to keep your leg(s) elevated on the car ride home.    Post-Procedure Instructions:             VNUS Closure and Phlebectomies    Post-Op Day Zero - The Day of Your  Procedure:0  1. Medication for Pain Control and Inflammation Control   - The numbing medication injected during your procedure will last for several hours. The pre-procedure                 tablets may make you very sleepy and you might not remember everything from the procedure or from                 the day. This will usually wear off by the next day.   - Ibuprofen:  If tolerated, take ibuprofen (e.g., Advil) to reduce inflammation whether or not you have                 pain. For three days, take two tablets (200 mg each) with every meal and at bedtime with a snack. If                 your pain is not controlled with ibuprofen, you may take prescription pain medication (such as Norco),                 if prescribed.   - You may resume taking any medications you were taking before your procedure.  2. Activity   - Rest with your leg(s) elevated above your heart. This will prevent swelling and bleeding.                 You do not need to elevate your leg(s) while sleeping at night. You may go upstairs, sit up to                 eat, use the bathroom, and take several five-minute walks. Otherwise, keep your leg(s) elevated.                 Minimize the amount of time you are up on your feet to about 30 minutes at a time.  3. Bandages   - The incision sites will be covered with soft bandages and an ACE wrap. Keep your bandages on and       dry for 48 hours. The ACE should provide  snug  compression but should not cause pain or numbness       in the toes. If you have significant discomfort or your toes become cold or numb, unwrap your ACE and       rewrap with less tension starting at the toes wrapping upward.  4. Incisions   - Bleeding: You may see some incision sites that are oozing through the bandages. This is not unusual       and can be managed with Rest, Ice, Compression and Elevation (RICE). Apply ice and firm pressure       directly to the site that is bleeding and rest with your leg(s) elevated above your  heart for 20-30 minutes.    Post-Op Day One:  1. Medication   - Ibuprofen: Continue the same as the Day of Your Procedure. If your pain is not controlled with       ibuprofen, you may take prescription pain medication (such as Norco), if prescribed.   2. Activity   - We would like you to get up at least six times and walk around for short periods of time, unless it is       causing you pain. You should not be on your feet more than 90 minutes at a time. Elevate your leg       above your heart when you are not walking.  3. Bandages   - Your bandages must be kept on and dry for 48 hours.  4. Driving   - You may resume driving when you can do so safely. Do not drive if you are taking narcotic pain       medication.    Post-Op Day Two:   1. Medication  - Ibuprofen: Continue the same as the Day of Your Procedure.  2.  Activity  - Walk as tolerated. Elevate as much as possible when not walking.  3. Bandages and Compression  - Remove ACE wrap and padding. Shower and put on your compression hose during waking hours only for at least five days.    (Your doctor may instruct you to keep your bandages on until your return appointment; please follow your doctor's instructions.)  4. Incisions  - Your leg(s) will be bruised; there may be swelling, hard knots under the skin and possibly some numbness. These will likely resolve over time.   If you see  hair-like  strings coming out of your incisions, do not pull them (this will only cause pain/discomfort). We will trim them when you come back for your follow-up appointment.  5. Call Us If:   - You see any areas on your leg that are red and angry in appearance.   - You notice any drainage that is milky or cloudy in appearance or has a foul odor.   - You run a temperature of 100.5 or greater.    Post-Op Day Three:  You will have a follow up appointment 2-4 days post-procedure. At this appointment, you will have an ultrasound and we will check your incisions.     __________________________________________________________________________________________    The Two Weeks Following Your Procedure  1.  Skin Care   - Do not use any lotions, creams or powders on your incision(s) for 14 days or until the incisions have               healed.   - Do not soak in a bathtub, hot tub or go swimming for 14 days or until your incisions have healed.  2.  Medications   - You may use ibuprofen or acetaminophen (e.g., Tylenol) as needed for pain or discomfort.  3.  Activity   - Do not lift over 25 pounds. After about two weeks you may resume exercise such as aerobics, running,               tennis or weightlifting. Use your common sense and ease back into your exercise routine slowly.   - You may feel a cord-like tightness along the inside of your leg. Gentle stretching can be helpful.  4. Compression Hose   - Your doctor may instruct you to wear compression for longer than seven days; please    follow your doctor's instructions. As a comfort measure, you may choose to wear compression for    longer than required.  5.  Travel   - Do not fly in an airplane for 14 days after your procedure. If you have a long car trip planned within    two to three weeks following your procedure, stop and walk for a few minutes every two hours.    Periodic ankle pumps during the ride may be helpful.    Six Week Appointment  - At your six-week appointment, you will see your surgeon for an exam and evaluation. This office visit   will be scheduled when you return for post-op day three return appointment.     Return to Work  1.  If you work outside the home, you may return to work in a few days depending on the extent of your        procedure, how you tolerate it, and the type of work you perform.  2.  Paperwork: If your employer requires paperwork or you would like a letter written to your employer, please        let us know. We will complete disability type forms at no charge. Please allow five business days  for forms        to be completed.      [employer] last reviewed this educational content on 11/1/2019 (RFA photo), 12/1/2019 (Phlebectomy photo)    9700-2138 [employer]. All rights reserved. This information is not intended as a substitute for professional medical care. Always follow your healthcare professional's instructions.

## 2022-10-11 NOTE — PROGRESS NOTES
Rosalie Sauceda is an extremely pleasant 47 year old year old female patient here today for skin check. She notes rough area by left side of nose. She notes present for a few months. No pain or bleeding. She also notes brown spot on right breast, present for years, maybe peeling.   Patient has no other skin complaints today.  Remainder of the HPI, Meds, PMH, Allergies, FH, and SH was reviewed in chart.    Pertinent Hx:  No personal history of skin cancer.   Past Medical History:   Diagnosis Date     Lumbago     back injury in the past       Past Surgical History:   Procedure Laterality Date     C/SECTION, LOW TRANSVERSE  6/2006     LIGATN/STRIP LONG & SHORT SAPHEN  7/2007        Family History   Problem Relation Age of Onset     Cancer Father         skin ca     Diabetes Maternal Grandmother      Breast Cancer Maternal Grandmother         Dx in her 70's     Hypertension Maternal Grandmother      Cancer Maternal Grandfather         LUNG     Prostate Cancer Maternal Grandfather        Social History     Socioeconomic History     Marital status:      Spouse name: Not on file     Number of children: 2     Years of education: Not on file     Highest education level: Not on file   Occupational History     Employer: [employer]   Tobacco Use     Smoking status: Former     Packs/day: 1.00     Years: 12.00     Pack years: 12.00     Types: Cigarettes     Smokeless tobacco: Never   Vaping Use     Vaping Use: Every day     Substances: Nicotine, Flavoring     Devices: Disposable   Substance and Sexual Activity     Alcohol use: Yes     Comment: 10 drinks per month- or less.     Drug use: No     Sexual activity: Yes     Partners: Male     Birth control/protection: I.U.D.   Other Topics Concern      Service No     Blood Transfusions No     Caffeine Concern No     Occupational Exposure No     Hobby Hazards No     Sleep Concern No     Stress Concern No     Weight Concern No     Special Diet No     Back Care Yes      Comment: Occasional chiropractor     Exercise No     Bike Helmet No     Seat Belt No     Self-Exams No     Parent/sibling w/ CABG, MI or angioplasty before 65F 55M? No   Social History Narrative     Not on file     Social Determinants of Health     Financial Resource Strain: Not on file   Food Insecurity: Not on file   Transportation Needs: Not on file   Physical Activity: Not on file   Stress: Not on file   Social Connections: Not on file   Intimate Partner Violence: Not on file   Housing Stability: Not on file       Outpatient Encounter Medications as of 10/11/2022   Medication Sig Dispense Refill     acetaminophen (TYLENOL) 500 MG tablet Take 500-1,000 mg by mouth every 6 hours as needed for mild pain (Patient not taking: No sig reported)       valACYclovir (VALTREX) 1000 mg tablet TAKE 1 TABLET BY MOUTH TWICE DAILY FOR 3 DAYS (Patient not taking: No sig reported)       No facility-administered encounter medications on file as of 10/11/2022.             O:   NAD, WDWN, Alert & Oriented, Mood & Affect wnl, Vitals stable   Here today alone   There were no vitals taken for this visit.   General appearance normal   Vitals stable   Alert, oriented and in no acute distress    Gritty papule on left NSW   Stuck on papules and brown macules on trunk and ext   Red papules on trunk  Brown papules and macules with regular pigment network and borders on torso and extremities     The remainder of skin exam is normal      Eyes: Conjunctivae/lids:Normal     ENT: Lips: normal    MSK:Normal    Cardiovascular: peripheral edema none    Pulm: Breathing Normal    Neuro/Psych: Orientation:Alert and Orientedx3 ; Mood/Affect:normal     A/P:  1. Actinic keratosis on left NSW x 1  LN2:  Treated with LN2 for 5s for 1-2 cycles. Warned risks of blistering, pain, pigment change, scarring, and incomplete resolution.  Advised patient to return if lesions do not completely resolve.  Wound care sheet given.  2. Seborrheic keratosis, lentigo,  angioma, benign nevi   It was a pleasure speaking to Rosalie Sauceda today.  BENIGN LESIONS DISCUSSED WITH PATIENT:  I discussed the specifics of tumor, prognosis, and genetics of benign lesions.  I explained that treatment of these lesions would be purely cosmetic and not medically neccessary.  I discussed with patient different removal options including excision, cautery and /or laser.      Nature and genetics of benign skin lesions dicussed with patient.  Signs and Symptoms of skin cancer discussed with patient.  ABCDEs of melanoma reviewed with patient.  Patient encouraged to perform monthly skin exams.  UV precautions reviewed with patient.  Risks of non-melanoma skin cancer discussed with patient   Return to clinic in one year or sooner if needed.

## 2022-11-15 ENCOUNTER — HOSPITAL ENCOUNTER (OUTPATIENT)
Dept: PHYSICAL THERAPY | Facility: CLINIC | Age: 47
Setting detail: THERAPIES SERIES
Discharge: HOME | End: 2022-11-15
Attending: PEDIATRICS
Payer: COMMERCIAL

## 2022-11-15 DIAGNOSIS — M72.2 PLANTAR FASCIITIS: ICD-10-CM

## 2022-11-15 DIAGNOSIS — M25.851 RIGHT HIP IMPINGEMENT SYNDROME: ICD-10-CM

## 2022-11-15 DIAGNOSIS — M79.671 BILATERAL FOOT PAIN: ICD-10-CM

## 2022-11-15 DIAGNOSIS — M79.672 BILATERAL FOOT PAIN: ICD-10-CM

## 2022-11-15 DIAGNOSIS — M25.551 RIGHT HIP PAIN: ICD-10-CM

## 2022-11-15 PROCEDURE — 97162 PT EVAL MOD COMPLEX 30 MIN: CPT | Mod: GP | Performed by: PHYSICAL THERAPIST

## 2022-11-15 PROCEDURE — 97110 THERAPEUTIC EXERCISES: CPT | Mod: GP | Performed by: PHYSICAL THERAPIST

## 2022-11-15 PROCEDURE — 97140 MANUAL THERAPY 1/> REGIONS: CPT | Mod: GP | Performed by: PHYSICAL THERAPIST

## 2022-11-15 NOTE — PROGRESS NOTES
PHYSICAL THERAPY EVALUATION    11/15/22 1300   General Information   Type of Visit Initial OP Ortho PT Evaluation   Start of Care Date 11/15/22   Referring Physician DR Diez   Patient/Family Goals Statement better hip ROM, decrease feet pain   Orders Evaluate and Treat   Date of Order 07/17/22   Certification Required? No   Medical Diagnosis R hip pain, B foot pain   Surgical/Medical history reviewed Yes   Body Part(s)   Body Part(s) Hip   Presentation and Etiology   Pertinent history of current problem (include personal factors and/or comorbidities that impact the POC) B plantar fascitis arjun when wearing flip flops, barefoot. Just got crocs to wear in home. Does calf stretches, has tried assorted shoe inserts allOTC, pain about a year.  R hip pain after sitting on saddle chair and getting up, or sitting on couch in evening too long and getting up. Once it warms up, works better. Hurts alot, almost can't walk on it to start. Hip gettting worse 8 months  Pain 0-6/10   Onset date of current episode/exacerbation 03/15/22   Prior Level of Function   Functional Level Prior Comment indep living, ski's in winter   Current Level of Function   Patient role/employment history A. Employed   Employment Comments [employer]   Living environment [facility]   Fall Risk Screen   Fall screen completed by PT   Have you fallen 2 or more times in the past year? No   Have you fallen and had an injury in the past year? No   Is patient a fall risk? No   Abuse Screen (yes response referral indicated)   Physical Signs of Abuse Present no   Hip Objective Findings   Gait/Locomotion walks to outer edge of feet   Side (if bilateral, select both right and left) Right   Hip ROM Comments ankle DF 10*, gastroc in WB 40*   Lumbar ROM WNL, no affect on hip sx   Scour Test neg   JANAY Test + limited ROM   FADIR Test neg   Palpation tender R>L foot at FDB   Posture B feet supinated R>L, B genu varum, L lumbar curve more noticeable onfwd bend    Right Hamstring Flexibility 70* B   Right Piriformis Flexibility tight   Right Prone Quad Flexibility min tight   Right Hip Flexion PROM WNL   Right Hip Abduction PROM 40, L 45   Right Hip Adduction PROM mild loss   Right Hip ER PROM 40* at flex90, JANAY 30* post hip pain   Right Hip IR PROM 20*, L 30*   Right Hip Ext PROM mild tight vs L   Right Hip Flexion Strength 4   Right Hip Abduction Strength 4+   Right Hip Extension Strength 4   Right Hip ER Strength 4   Planned Therapy Interventions   Planned Therapy Interventions manual therapy;joint mobilization;strengthening;stretching   Clinical Impression   Criteria for Skilled Therapeutic Interventions Met yes, treatment indicated   PT Diagnosis R hip limited ROM and decreased strength, B plantar fascitis R>L   Functional limitations due to impairments walking/standing, getting up after sitting too long   Clinical Presentation Evolving/Changing   Clinical Presentation Rationale 2 separate body parts, ROM, strength   Clinical Decision Making (Complexity) Moderate complexity   Therapy Frequency 1 time/week   Predicted Duration of Therapy Intervention (days/wks) see every 2-3 weeks, primary focus on plantar fascitis in clinic, strength and strethces given for hip HEP   Risk & Benefits of therapy have been explained Yes   Patient, Family & other staff in agreement with plan of care Yes   Education Assessment   Preferred Learning Style Listening   Barriers to Learning No barriers   ORTHO GOALS   PT Ortho Eval Goals 1;2   Ortho Goal 1   Goal Identifier 1   Goal Description pt will be able to sit 10-15 min and get up w/o hip pain in 6wk   Target Date 12/27/22   Ortho Goal 2   Goal Identifier 2   Goal Description pt will be able to walk /stand for work with foot pain no worse than 2/10 in6wk   Target Date 12/27/22   Total Evaluation Time   PT Eval, Moderate Complexity Minutes (90074) 25   M [facility]  Kris Hoenk  PT  [facility]  [address] [place] [address]  [email or web address]  "[email or web address]   Office: [phone]  Voicemail: [phone]

## 2022-11-19 ENCOUNTER — HEALTH MAINTENANCE LETTER (OUTPATIENT)
Age: 47
End: 2022-11-19

## 2022-12-20 NOTE — PROGRESS NOTES
Discharge Note -Physical Therapy    NAME:  Rosalie Sauceda  MRN:   [id number]    S:    Pt did not follow up for therapy as recommended.    O:  Objective information is not available as pt has not returned for therapy.  Last objective information or progress note will serve as final entry.    A:   Pt did not return for further treatment.    Status of goals is unknown     P:  Discharge from PT this date.      [facility]  Kris Hoenk  PT  [facility]  [address] [place] [address]  [email or web address]   Office: [phone]  Voicemail: [phone]

## 2022-12-23 ENCOUNTER — TELEPHONE (OUTPATIENT)
Dept: FAMILY MEDICINE | Facility: CLINIC | Age: 47
End: 2022-12-23

## 2022-12-23 NOTE — TELEPHONE ENCOUNTER
Patient Quality Outreach    Patient is due for the following:   Colon Cancer Screening    Next Steps:   ptto schedule    Type of outreach:    Sent letter.      Questions for provider review:    None     Kavon Mckeon

## 2022-12-23 NOTE — LETTER
[facility]  [address]  [facility] [address]  Phone: [phone]    December 23, 2022    To  Rosalie MOORE [facility]  [address] [facility] [address]    Your team at [facility] cares about your health. We have reviewed your chart and based on our findings; we are making the following recommendations to better manage your health.     You are in particular need of attention regarding the following:     Call or MyChart message your clinic to schedule a colonoscopy, schedule/ a FIT Test, or order a Cologuard test. If you are unsure what type of test you need, please call your clinic and speak to clinic staff.   Colon cancer is now the second leading cause of cancer-related deaths in the [place] for both men and women and there are over 130,000 new cases and 50,000 deaths per year from colon cancer. Colonoscopies can prevent 90-95% of these deaths. Problem lesions can be removed before they ever become cancer. This test is not only looking for cancer, but also getting rid of precancerous lesions.   If you are under/uninsured, we recommend you contact the [employer] Program.[employer]ing [employer] is a free colorectal cancer screening program that provides colonoscopies for eligible under/uninsured [place] men and women. If you are interested in receiving a free colonoscopy, please call [facility] at t [phone] (mention code [employer]Web) to see if you're eligible. Please have them send us the results.   Please call [phone] to schedule a colonoscopy.    If you have already completed these items, please contact the clinic via phone or   [employer] managementhart so your care team can review and update your records. Thank you for   choosing [facility] for your healthcare needs. For any questions,   concerns, or to schedule an appointment please contact our clinic.    Healthy Regards,      Your [facility] Care Team

## 2023-01-26 ENCOUNTER — TELEPHONE (OUTPATIENT)
Dept: OBGYN | Facility: CLINIC | Age: 48
End: 2023-01-26

## 2023-01-26 NOTE — TELEPHONE ENCOUNTER
Panel Management Review        Health Maintenance List    Health Maintenance   Topic Date Due     HEPATITIS B IMMUNIZATION (1 of 3 - 3-dose series) Never done     COLORECTAL CANCER SCREENING  Never done     COVID-19 Vaccine (2 - Booster for Bridgett series) 04/21/2022     INFLUENZA VACCINE (1) 09/01/2022     PHQ-2 (once per calendar year)  01/01/2023     MAMMO SCREENING  02/17/2023     NICOTINE/TOBACCO CESSATION COUNSELING Q 1 YR  06/02/2023     YEARLY PREVENTIVE VISIT  06/02/2023     HPV TEST  06/02/2027     ADVANCE CARE PLANNING  06/02/2027     PAP  06/02/2027     LIPID  07/20/2027     DTAP/TDAP/TD IMMUNIZATION (4 - Td or Tdap) 08/03/2027     HEPATITIS C SCREENING  Completed     HIV SCREENING  Completed     Pneumococcal Vaccine: Pediatrics (0 to 5 Years) and At-Risk Patients (6 to 64 Years)  Aged Out     IPV IMMUNIZATION  Aged Out     MENINGITIS IMMUNIZATION  Aged Out       Composite cancer screening  Chart review shows that this patient is due/due soon for the following Colonoscopy  Lab Results   Component Value Date    PAP NIL 02/17/2017     Past Surgical History:   Procedure Laterality Date     C/SECTION, LOW TRANSVERSE  6/2006     LIGATN/STRIP LONG & SHORT SAPHEN  7/2007       Is hysterectomy listed in surgical history? No   Is mastectomy listed in surgical history? No     Summary:    Patient is due/failing the following:   Colonoscopy    Action needed: Patient needs referral/order: colonoscopy    Type of outreach:  Sent Recovr message.      Staff Signature:  Urmila Kumar MA

## 2023-04-18 ENCOUNTER — TELEPHONE (OUTPATIENT)
Dept: FAMILY MEDICINE | Facility: CLINIC | Age: 48
End: 2023-04-18

## 2023-04-18 NOTE — TELEPHONE ENCOUNTER
Patient Quality Outreach    Patient is due for the following:   Colon Cancer Screening    Next Steps:   PT TO SCHEDULE    Type of outreach:    Sent letter.      Questions for provider review:    NONE      Kavon Mckeon

## 2023-04-18 NOTE — LETTER
April 18, 2023    To  Rosalie MOORE [facility]  [address] [place] [address]    Your team at [facility] cares about your health. We have reviewed your chart and based on our findings; we are making the following recommendations to better manage your health.     You are in particular need of attention regarding the following:     Call or MyChart message your clinic to schedule a colonoscopy, schedule/ a FIT Test, or order a Cologuard test. If you are unsure what type of test you need, please call your clinic and speak to clinic staff.   Colon cancer is now the second leading cause of cancer-related deaths in the [facility] for both men and women and there are over 130,000 new cases and 50,000 deaths per year from colon cancer. Colonoscopies can prevent 90-95% of these deaths. Problem lesions can be removed before they ever become cancer. This test is not only looking for cancer, but also getting rid of precancerous lesions.   If you are under/uninsured, we recommend you contact the [employer] Program.[employer] is a free colorectal cancer screening program that provides colonoscopies for eligible under/uninsured [place] men and women. If you are interested in receiving a free colonoscopy, please call [employer] at t [phone] (mention code [employer]Web) to see if you're eligible. Please have them send us the results.   Schedule an office visit with your provider if you are interested in completing your colon cancer screening with a Cologuard test    If you have already completed these items, please contact the clinic via phone or   Kalturahart so your care team can review and update your records. Thank you for   choosing [facility] for your healthcare needs. For any questions,   concerns, or to schedule an appointment please contact our clinic.    Healthy Regards,      Your [facility] Care Team

## 2023-06-01 ENCOUNTER — HEALTH MAINTENANCE LETTER (OUTPATIENT)
Age: 48
End: 2023-06-01

## 2023-06-22 ENCOUNTER — HOSPITAL ENCOUNTER (OUTPATIENT)
Dept: MAMMOGRAPHY | Facility: CLINIC | Age: 48
Discharge: HOME | End: 2023-06-22
Payer: COMMERCIAL

## 2023-06-22 DIAGNOSIS — Z12.31 VISIT FOR SCREENING MAMMOGRAM: ICD-10-CM

## 2023-06-22 PROCEDURE — 77067 SCR MAMMO BI INCL CAD: CPT

## 2023-07-02 ENCOUNTER — HEALTH MAINTENANCE LETTER (OUTPATIENT)
Age: 48
End: 2023-07-02

## 2023-07-03 ENCOUNTER — TELEPHONE (OUTPATIENT)
Dept: OBGYN | Facility: CLINIC | Age: 48
End: 2023-07-03

## 2023-07-03 NOTE — LETTER
July 3, 2023      Rosalie MOORE [facility]  [address] [facility] [address]        Dear Rosalie,     To ensure we are providing the best quality care, we have reviewed your chart and see that you are due for:    Colon Cancer Screening:    If you have received a referral to schedule a Colonoscopy or choose to do a Colonoscopy instead of the FIT/ Cologuard test, please call [phone] to schedule.    If you have received a FIT test kit from a prior office visit, please check the expiration date. If , please get a new kit from the Lab/ Clinic. If not , please complete the test and mail in as soon as you are able.    If you would prefer to complete a Cologuard test, please reach out to your provider to see if this is an option for you.    You may call Dept: [phone] if you have any questions. If you have completed the tests outside of [facility], please have the results forwarded to our office Fax # [phone]. We will update the chart for your primary Physician to review before your next annual physical.        Sincerely,        Griselda Spencer MD

## 2023-07-03 NOTE — TELEPHONE ENCOUNTER
Panel Management Review        Health Maintenance List    Health Maintenance   Topic Date Due     HEPATITIS B IMMUNIZATION (1 of 3 - 3-dose series) Never done     COLORECTAL CANCER SCREENING  Never done     COVID-19 Vaccine (2 - Booster for Bridgett series) 04/21/2022     PHQ-2 (once per calendar year)  01/01/2023     NICOTINE/TOBACCO CESSATION COUNSELING Q 1 YR  06/02/2023     YEARLY PREVENTIVE VISIT  06/02/2023     INFLUENZA VACCINE (1) 09/01/2023     MAMMO SCREENING  06/22/2024     HPV TEST  06/02/2027     ADVANCE CARE PLANNING  06/02/2027     PAP  06/02/2027     LIPID  07/20/2027     DTAP/TDAP/TD IMMUNIZATION (4 - Td or Tdap) 08/03/2027     HEPATITIS C SCREENING  Completed     HIV SCREENING  Completed     Pneumococcal Vaccine: Pediatrics (0 to 5 Years) and At-Risk Patients (6 to 64 Years)  Aged Out     IPV IMMUNIZATION  Aged Out     MENINGITIS IMMUNIZATION  Aged Out       Composite cancer screening  Chart review shows that this patient is due/due soon for the following Colonoscopy  Lab Results   Component Value Date    PAP NIL 02/17/2017     Past Surgical History:   Procedure Laterality Date     C/SECTION, LOW TRANSVERSE  6/2006     LIGATN/STRIP LONG & SHORT SAPHEN  7/2007       Is hysterectomy listed in surgical history? No   Is mastectomy listed in surgical history? No     Summary:    Patient is due/failing the following:   Colonoscopy    Action needed: Patient needs office visit for colonoscopy.    Type of outreach:  Sent Workables message.      Staff Signature:  Urmila Kumar MA

## 2023-07-11 ENCOUNTER — NURSE TRIAGE (OUTPATIENT)
Dept: FAMILY MEDICINE | Facility: CLINIC | Age: 48
End: 2023-07-11

## 2023-07-11 ENCOUNTER — HOSPITAL ENCOUNTER (EMERGENCY)
Facility: CLINIC | Age: 48
Discharge: HOME | End: 2023-07-11
Admitting: EMERGENCY MEDICINE
Payer: COMMERCIAL

## 2023-07-11 VITALS — DIASTOLIC BLOOD PRESSURE: 83 MMHG | OXYGEN SATURATION: 98 % | SYSTOLIC BLOOD PRESSURE: 153 MMHG | HEART RATE: 94 BPM

## 2023-07-11 PROCEDURE — G0463 HOSPITAL OUTPT CLINIC VISIT: HCPCS | Performed by: EMERGENCY MEDICINE

## 2023-07-11 NOTE — TELEPHONE ENCOUNTER
Disposition:  Go to office now for dizziness present now, after 2 hours of rest and fluids.   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 Patient states she's at work and likely can't be seen for an hour anyway.  Urgent care recommended, and not routed to provider because patient does not have an established PCP.  It sounds like patient is agreeable to going to urgent care, unsure if she'll go now as advised, or finish shift in an hour.     Reason for Disposition    Lightheadedness (dizziness) present now, after 2 hours of rest and fluids    Additional Information    Negative: SEVERE difficulty breathing (e.g., struggling for each breath, speaks in single words)    Negative: Shock suspected (e.g., cold/pale/clammy skin, too weak to stand, low BP, rapid pulse)    Negative: Difficult to awaken or acting confused (e.g., disoriented, slurred speech)    Negative: Fainted, and still feels dizzy afterwards    Negative: Overdose (accidental or intentional) of medications    Negative: New neurologic deficit that is present now: * Weakness of the face, arm, or leg on one side of the body * Numbness of the face, arm, or leg on one side of the body * Loss of speech or garbled speech    Negative: Heart beating < 50 beats per minute OR > 140 beats per minute    Negative: Sounds like a life-threatening emergency to the triager    Negative: Chest pain    Negative: Rectal bleeding, bloody stool, or tarry-black stool    Negative: Vomiting is main symptom    Negative: Diarrhea is main symptom    Negative: Headache is main symptom    Negative: Heat exhaustion suspected (i.e., dehydration from heat exposure)    Negative: Patient states that they are having an anxiety or panic attack    Negative: Dizziness from low blood sugar (i.e., < 60 mg/dl or 3.5 mmol/l)    Negative: SEVERE dizziness (e.g., unable to stand, requires support to walk, feels like passing out now)    Negative: SEVERE headache or  "neck pain    Negative: Spinning or tilting sensation (vertigo) present now and one or more stroke risk factors (i.e., hypertension, diabetes mellitus, prior stroke/TIA, heart attack, age over 60) (Exception: prior physician evaluation for this AND no different/worse than usual)    Negative: Neurologic deficit that was brief (now gone), ANY of the following:* Weakness of the face, arm, or leg on one side of the body* Numbness of the face, arm, or leg on one side of the body* Loss of speech or garbled speech    Negative: Loss of vision or double vision  (Exception: Similar to previous migraines.)    Negative: Extra heart beats OR irregular heart beating (i.e., 'palpitations')    Negative: Difficulty breathing    Negative: Drinking very little and has signs of dehydration (e.g., no urine > 12 hours, very dry mouth, very lightheaded)    Negative: Follows bleeding (e.g., stomach, rectum, vagina)  (Exception: Became dizzy from sight of small amount blood.)    Negative: Patient sounds very sick or weak to the triager    Answer Assessment - Initial Assessment Questions  1. DESCRIPTION: \"Describe your dizziness.\"      Feeling a bit dizzy/vertigo intermittently.    2. LIGHTHEADED: \"Do you feel lightheaded?\" (e.g., somewhat faint, woozy, weak upon standing)      No, more vertigo/dizzy like on a boat.   3. VERTIGO: \"Do you feel like either you or the room is spinning or tilting?\" (i.e. vertigo)      Yes  4. SEVERITY: \"How bad is it?\"  \"Do you feel like you are going to faint?\" \"Can you stand and walk?\"    - MILD: Feels slightly dizzy, but walking normally.    - MODERATE: Feels unsteady when walking, but not falling; interferes with normal activities (e.g., school, work).    - SEVERE: Unable to walk without falling, or requires assistance to walk without falling; feels like passing out now.       Mild to moderate.  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 It only " "lasts like this for about 10 seconds.   5. ONSET:  \"When did the dizziness begin?\"      Started Friday morning when got out of bed.  Nothing seemed to precipitate it.  Then intermittently since. Yesterday was most of the day, then went away in the evening, then came back last evening.   6. AGGRAVATING FACTORS: \"Does anything make it worse?\" (e.g., standing, change in head position)      Changing head position seems to worsen, especially to L side.   7. HEART RATE: \"Can you tell me your heart rate?\" \"How many beats in 15 seconds?\"  (Note: not all patients can do this)        Patient reports normal heart rate and denies any cardiac symptoms including palpitations or chest pain or SOB.   8. CAUSE: \"What do you think is causing the dizziness?\"      Unsure, but thinks vertigo and thinks has some fluid in L ear. Has taken allergy pill and Sudafed since yesterday to try to dry up, but not helping.  Also took a renetta supplement for motion sickness, but not helping either.   9. RECURRENT SYMPTOM: \"Have you had dizziness before?\" If Yes, ask: \"When was the last time?\" \"What happened that time?\"      No, first time.   10. OTHER SYMPTOMS: \"Do you have any other symptoms?\" (e.g., fever, chest pain, vomiting, diarrhea, bleeding)        Seems to have a bit of fluid in L ear.  Otherwise no other symptoms.  No HTN and not on any BP meds.   11. PREGNANCY: \"Is there any chance you are pregnant?\" \"When was your last menstrual period?\"        No    Protocols used: DIZZINESS-A-OH    Maday Barton RN  [facility]    "

## 2023-07-11 NOTE — ED TRIAGE NOTES
DIZZINESS AND NAUSEA SINCE 7/7/23 AND COMES AND GOES. PT REPORTS THAT IS WORSE AT NIGHTTIME WHEN SHE IS LAYING DOWN.     Pt denies dizziness right now.    Writer and provider spoke with pt about treatment and diagnostic options in ED vs. UC. Pt declined to be seen at this time and left after triage.

## 2023-10-19 NOTE — MR AVS SNAPSHOT
"              After Visit Summary   11/14/2017    Rosalie Sauceda    MRN: [id number]           Patient Information     Date Of Birth          1975        Visit Information        Provider Department      11/14/2017 2:20 PM Xavi Carson,  [facility]        Today's Diagnoses     Left lateral epicondylitis    -  1    Left elbow pain           Follow-ups after your visit        Who to contact     If you have questions or need follow up information about today's clinic visit or your schedule please contact [facility] directly at [phone].  Normal or non-critical lab and imaging results will be communicated to you by [employer], letter or phone within 4 business days after the clinic has received the results. If you do not hear from us within 7 days, please contact the clinic through [place] (RTP)t or phone. If you have a critical or abnormal lab result, we will notify you by phone as soon as possible.  Submit refill requests through [employer] or call your pharmacy and they will forward the refill request to us. Please allow 3 business days for your refill to be completed.          Additional Information About Your Visit        MyChart Information     [employer] gives you secure access to your electronic health record. If you see a primary care provider, you can also send messages to your care team and make appointments. If you have questions, please call your primary care clinic.  If you do not have a primary care provider, please call [phone] and they will assist you.        Care EveryWhere ID     This is your Care EveryWhere ID. This could be used by other organizations to access your [facility] records  [id number]        Your Vitals Were     Height BMI (Body Mass Index)                5' 9.5\" (1.765 m) 25.04 kg/m2           Blood Pressure from Last 3 Encounters:   11/14/17 121/80   11/01/17 126/82   09/13/17 133/82    Weight from Last " 3 Encounters:   11/14/17 172 lb (78 kg)   11/01/17 172 lb 9.6 oz (78.3 kg)   09/13/17 168 lb (76.2 kg)              Today, you had the following     No orders found for display       Primary Care Provider Office Phone # Fax #    [facility] [place] [phone] [address] [facility] [address]        Equal Access to Services     YASMEEN JOHNSON : Hadii aad ku hadasho Soomaali, waaxda luqadaha, qaybta kaalmada adeegyada, waxay idiin hayaan adeeg kharash laeitan maloney. So [facility] [phone].    ATENCIÓN: Si abrilla espling, tiene a atkins disposición servicios gratuitos de asistencia lingüística. Llame al [phone].    We comply with applicable federal civil rights laws and [place] laws. We do not discriminate on the basis of race, color, national origin, age, disability, sex, sexual orientation, or gender identity.            Thank you!     Thank you for choosing [facility]  for your care. Our goal is always to provide you with excellent care. Hearing back from our patients is one way we can continue to improve our services. Please take a few minutes to complete the written survey that you may receive in the mail after your visit with us. Thank you!             Your Updated Medication List - Protect others around you: Learn how to safely use, store and throw away your medicines at www.disposemymeds.org.          This list is accurate as of: 11/14/17 11:59 PM.  Always use your most recent med list.                   Brand Name Dispense Instructions for use Diagnosis    fluticasone 50 MCG/ACT spray    FLONASE    1 Bottle    Spray 1-2 sprays into both nostrils daily    Otitis media with effusion, bilateral       levonorgestrel 20 MCG/24HR IUD    MIRENA    1 each    1 each (20 mcg) by Intrauterine route once for 1 dose    Encounter for insertion of mirena IUD       nicotine polacrilex 4 MG lozenge    NICOTINE MINI    48 tablet    Place 1 lozenge (4 mg) inside cheek  as needed for smoking cessation    Encounter for smoking cessation counseling       nitroFURantoin (macrocrystal-monohydrate) 100 MG capsule    MACROBID    14 capsule    Take 1 capsule (100 mg) by mouth 2 times daily    Dysuria          Minoxidil Counseling: Minoxidil is a topical medication which can increase blood flow where it is applied. It is uncertain how this medication increases hair growth. Side effects are uncommon and include stinging and allergic reactions.

## 2024-02-13 ENCOUNTER — OFFICE VISIT (OUTPATIENT)
Dept: FAMILY MEDICINE | Facility: CLINIC | Age: 49
End: 2024-02-13
Payer: COMMERCIAL

## 2024-02-13 VITALS
BODY MASS INDEX: 31.08 KG/M2 | WEIGHT: 198 LBS | HEART RATE: 78 BPM | HEIGHT: 67 IN | TEMPERATURE: 98 F | DIASTOLIC BLOOD PRESSURE: 78 MMHG | OXYGEN SATURATION: 98 % | SYSTOLIC BLOOD PRESSURE: 122 MMHG | RESPIRATION RATE: 20 BRPM

## 2024-02-13 DIAGNOSIS — M72.2 PLANTAR FASCIITIS: ICD-10-CM

## 2024-02-13 DIAGNOSIS — Z00.00 ROUTINE GENERAL MEDICAL EXAMINATION AT A HEALTH CARE FACILITY: Primary | ICD-10-CM

## 2024-02-13 DIAGNOSIS — H43.392 FLOATERS IN VISUAL FIELD, LEFT: ICD-10-CM

## 2024-02-13 DIAGNOSIS — Z12.11 SCREEN FOR COLON CANCER: ICD-10-CM

## 2024-02-13 DIAGNOSIS — Z12.31 ENCOUNTER FOR SCREENING MAMMOGRAM FOR BREAST CANCER: ICD-10-CM

## 2024-02-13 DIAGNOSIS — Z13.220 LIPID SCREENING: ICD-10-CM

## 2024-02-13 DIAGNOSIS — Z71.6 ENCOUNTER FOR SMOKING CESSATION COUNSELING: ICD-10-CM

## 2024-02-13 LAB
ANION GAP SERPL CALCULATED.3IONS-SCNC: 11 MMOL/L (ref 7–15)
BUN SERPL-MCNC: 15.3 MG/DL (ref 6–20)
CALCIUM SERPL-MCNC: 10 MG/DL (ref 8.6–10)
CHLORIDE SERPL-SCNC: 100 MMOL/L (ref 98–107)
CHOLEST SERPL-MCNC: 178 MG/DL
CREAT SERPL-MCNC: 0.73 MG/DL (ref 0.51–0.95)
DEPRECATED HCO3 PLAS-SCNC: 26 MMOL/L (ref 22–29)
EGFRCR SERPLBLD CKD-EPI 2021: >90 ML/MIN/1.73M2
FASTING STATUS PATIENT QL REPORTED: NO
GLUCOSE SERPL-MCNC: 93 MG/DL (ref 70–99)
HDLC SERPL-MCNC: 46 MG/DL
LDLC SERPL CALC-MCNC: 108 MG/DL
NONHDLC SERPL-MCNC: 132 MG/DL
POTASSIUM SERPL-SCNC: 3.9 MMOL/L (ref 3.4–5.3)
SODIUM SERPL-SCNC: 137 MMOL/L (ref 135–145)
TRIGL SERPL-MCNC: 118 MG/DL

## 2024-02-13 PROCEDURE — 36415 COLL VENOUS BLD VENIPUNCTURE: CPT | Performed by: STUDENT IN AN ORGANIZED HEALTH CARE EDUCATION/TRAINING PROGRAM

## 2024-02-13 PROCEDURE — 80048 BASIC METABOLIC PNL TOTAL CA: CPT | Performed by: STUDENT IN AN ORGANIZED HEALTH CARE EDUCATION/TRAINING PROGRAM

## 2024-02-13 PROCEDURE — 80061 LIPID PANEL: CPT | Performed by: STUDENT IN AN ORGANIZED HEALTH CARE EDUCATION/TRAINING PROGRAM

## 2024-02-13 PROCEDURE — 99396 PREV VISIT EST AGE 40-64: CPT | Performed by: STUDENT IN AN ORGANIZED HEALTH CARE EDUCATION/TRAINING PROGRAM

## 2024-02-13 RX ORDER — NICOTINE 21 MG/24HR
1 PATCH, TRANSDERMAL 24 HOURS TRANSDERMAL EVERY 24 HOURS
Qty: 14 PATCH | Refills: 0 | Status: SHIPPED | OUTPATIENT
Start: 2024-04-12 | End: 2024-04-26

## 2024-02-13 RX ORDER — NICOTINE 21 MG/24HR
1 PATCH, TRANSDERMAL 24 HOURS TRANSDERMAL EVERY 24 HOURS
Qty: 14 PATCH | Refills: 0 | Status: SHIPPED | OUTPATIENT
Start: 2024-03-28 | End: 2024-04-11

## 2024-02-13 SDOH — HEALTH STABILITY: PHYSICAL HEALTH: ON AVERAGE, HOW MANY MINUTES DO YOU ENGAGE IN EXERCISE AT THIS LEVEL?: 40 MIN

## 2024-02-13 SDOH — HEALTH STABILITY: PHYSICAL HEALTH: ON AVERAGE, HOW MANY DAYS PER WEEK DO YOU ENGAGE IN MODERATE TO STRENUOUS EXERCISE (LIKE A BRISK WALK)?: 3 DAYS

## 2024-02-13 ASSESSMENT — SOCIAL DETERMINANTS OF HEALTH (SDOH): HOW OFTEN DO YOU GET TOGETHER WITH FRIENDS OR RELATIVES?: MORE THAN THREE TIMES A WEEK

## 2024-02-13 ASSESSMENT — PAIN SCALES - GENERAL: PAINLEVEL: NO PAIN (0)

## 2024-02-13 NOTE — RESULT ENCOUNTER NOTE
Luh Sauceda,     It is a pleasure providing you with medical care. I have received and reviewed your lab results, and have the following recommendations:     Based on the results of your cholesterol, I calculate your future risk for a heart attack in the next 10 years to be less than 10%.  At this time no medication changes are needed, however to limit risk for future heart attack or stroke, it is very important that you: Try to limit your dietary intake of fatty, greasy, oily, fried, take out, and fast food when possible. Also, I would suggest you cut back on red and processed meats, sodium and sugar-sweetened foods and beverages. Regarding exercise, please get at least 30 minutes of CONTINUOUS moderate intensity aerobic exercise at least 3 times per week.    Your basic metabolic panel was within normal limits indicating you do not have any electrolyte or kidney maladies.  Your glucose was within appropriate limits as well.    Sincerely,     Dee Pulido MD

## 2024-02-13 NOTE — PATIENT INSTRUCTIONS
"Eating Healthy Foods: Care Instructions  With every meal, you can make healthy food choices. Try to eat a variety of fruits, vegetables, whole grains, lean proteins, and low-fat dairy products. This can help you get the right balance of nutrients, including vitamins and minerals. Small changes add up over time. You can start by adding one healthy food to your meals each day.    Try to make half your plate fruits and vegetables, one-fourth whole grains, and one-fourth lean proteins. Try including dairy with your meals.   Eat more fruits and vegetables. Try to have them with most meals and snacks.   Foods for healthy eating    Fruits    These can be fresh, frozen, canned, or dried.  Try to choose whole fruit rather than fruit juice.  Eat a variety of colors.    Vegetables    These can be fresh, frozen, canned, or dried.  Beans, peas, and lentils count too.    Whole grains    Choose whole-grain breads, cereals, and noodles.  Try brown rice.    Lean proteins    These can include lean meat, poultry, fish, and eggs.  You can also have tofu, beans, peas, lentils, nuts, and seeds.    Dairy    Try milk, yogurt, and cheese.  Choose low-fat or fat-free when you can.  If you need to, use lactose-free milk or fortified plant-based milk products, such as soy milk.    Water    Drink water when you're thirsty.  Limit sugar-sweetened drinks, including soda, fruit drinks, and sports drinks.  Where can you learn more?  Go to https://www.Svpply.net/patiented  Enter T756 in the search box to learn more about \"Eating Healthy Foods: Care Instructions.\"  Current as of: February 28, 2023               Content Version: 13.8    1578-2736 [employer].   Care instructions adapted under license by your healthcare professional. If you have questions about a medical condition or this instruction, always ask your healthcare professional. [employer] disclaims any warranty or liability for your use of this " information.      Learning About Stress  What is stress?     Stress is your body's response to a hard situation. Your body can have a physical, emotional, or mental response. Stress is a fact of life for most people, and it affects everyone differently. What causes stress for you may not be stressful for someone else.  A lot of things can cause stress. You may feel stress when you go on a job interview, take a test, or run a race. This kind of short-term stress is normal and even useful. It can help you if you need to work hard or react quickly. For example, stress can help you finish an important job on time.  Long-term stress is caused by ongoing stressful situations or events. Examples of long-term stress include long-term health problems, ongoing problems at work, or conflicts in your family. Long-term stress can harm your health.  How does stress affect your health?  When you are stressed, your body responds as though you are in danger. It makes hormones that speed up your heart, make you breathe faster, and give you a burst of energy. This is called the fight-or-flight stress response. If the stress is over quickly, your body goes back to normal and no harm is done.  But if stress happens too often or lasts too long, it can have bad effects. Long-term stress can make you more likely to get sick, and it can make symptoms of some diseases worse. If you tense up when you are stressed, you may develop neck, shoulder, or low back pain. Stress is linked to high blood pressure and heart disease.  Stress also harms your emotional health. It can make you grimes, tense, or depressed. Your relationships may suffer, and you may not do well at work or school.  What can you do to manage stress?  You can try these things to help manage stress:   Do something active. Exercise or activity can help reduce stress. Walking is a great way to get started. Even everyday activities such as housecleaning or yard work can help.  Try  yoga or himanshu chi. These techniques combine exercise and meditation. You may need some training at first to learn them.  Do something you enjoy. For example, listen to music or go to a movie. Practice your hobby or do volunteer work.  Meditate. This can help you relax, because you are not worrying about what happened before or what may happen in the future.  Do guided imagery. Imagine yourself in any setting that helps you feel calm. You can use online videos, books, or a teacher to guide you.  Do breathing exercises. For example:  From a standing position, bend forward from the waist with your knees slightly bent. Let your arms dangle close to the floor.  Breathe in slowly and deeply as you return to a standing position. Roll up slowly and lift your head last.  Hold your breath for just a few seconds in the standing position.  Breathe out slowly and bend forward from the waist.  Let your feelings out. Talk, laugh, cry, and express anger when you need to. Talking with supportive friends or family, a counselor, or a colleen leader about your feelings is a healthy way to relieve stress. Avoid discussing your feelings with people who make you feel worse.  Write. It may help to write about things that are bothering you. This helps you find out how much stress you feel and what is causing it. When you know this, you can find better ways to cope.  What can you do to prevent stress?  You might try some of these things to help prevent stress:  Manage your time. This helps you find time to do the things you want and need to do.  Get enough sleep. Your body recovers from the stresses of the day while you are sleeping.  Get support. Your family, friends, and community can make a difference in how you experience stress.  Limit your news feed. Avoid or limit time on social media or news that may make you feel stressed.  Do something active. Exercise or activity can help reduce stress. Walking is a great way to get started.  Where  "can you learn more?  Go to https://www.Utterz.net/patiented  Enter N032 in the search box to learn more about \"Learning About Stress.\"  Current as of: February 26, 2023               Content Version: 13.8    9361-4779 [employer].   Care instructions adapted under license by your healthcare professional. If you have questions about a medical condition or this instruction, always ask your healthcare professional. [employer] disclaims any warranty or liability for your use of this information.      Substance Use Disorder: Care Instructions  Overview     You can improve your life and health by stopping your use of alcohol or drugs. When you don't drink or use drugs, you may feel and sleep better. You may get along better with your family, friends, and coworkers. There are medicines and programs that can help with substance use disorder.  How can you care for yourself at home?  Here are some ways to help you stay sober and prevent relapse.  If you have been given medicine to help keep you sober or reduce your cravings, be sure to take it exactly as prescribed.  Talk to your doctor about programs that can help you stop using drugs or drinking alcohol.  Do not keep alcohol or drugs in your home.  Plan ahead. Think about what you'll say if other people ask you to drink or use drugs. Try not to spend time with people who drink or use drugs.  Use the time and money spent on drinking or drugs to do something that's important to you.  Preventing a relapse  Have a plan to deal with relapse. Learn to recognize changes in your thinking that lead you to drink or use drugs. Get help before you start to drink or use drugs again.  Try to stay away from situations, friends, or places that may lead you to drink or use drugs.  If you feel the need to drink alcohol or use drugs again, seek help right away. Call a trusted friend or family member. Some people get support from organizations such as Narcotics " Anonymous or [employer] Recovery or from treatment facilities.  If you relapse, get help as soon as you can. Some people make a plan with another person that outlines what they want that person to do for them if they relapse. The plan usually includes how to handle the relapse and who to notify in case of relapse.  Don't give up. Remember that a relapse doesn't mean that you have failed. Use the experience to learn the triggers that lead you to drink or use drugs. Then quit again. Recovery is a lifelong process. Many people have several relapses before they are able to quit for good.  Follow-up care is a key part of your treatment and safety. Be sure to make and go to all appointments, and call your doctor if you are having problems. It's also a good idea to know your test results and keep a list of the medicines you take.  When should you call for help?   Call 911  anytime you think you may need emergency care. For example, call if you or someone else:    Has overdosed or has withdrawal signs. Be sure to tell the emergency workers that you are or someone else is using or trying to quit using drugs. Overdose or withdrawal signs may include:  Losing consciousness.  Seizure.  Seeing or hearing things that aren't there (hallucinations).     Is thinking or talking about suicide or harming others.   Where to get help 24 hours a day, 7 days a week   If you or someone you know talks about suicide, self-harm, a mental health crisis, a substance use crisis, or any other kind of emotional distress, get help right away. You can:    Call the Suicide and Crisis Lifeline at 988.     Call 3-593-811-TALK (1-810.298.1685).     Text HOME to 605610 to access the Crisis Text Line.   Consider saving these numbers in your phone.  Go to [place].org for more information or to chat online.  Call your doctor now or seek immediate medical care if:    You are having withdrawal symptoms. These may include nausea or vomiting, sweating, shakiness,  "and anxiety.   Watch closely for changes in your health, and be sure to contact your doctor if:    You have a relapse.     You need more help or support to stop.   Where can you learn more?  Go to https://www.Renrenmoney.net/patiented  Enter H573 in the search box to learn more about \"Substance Use Disorder: Care Instructions.\"  Current as of: March 21, 2023               Content Version: 13.8    3679-5144 [employer].   Care instructions adapted under license by your healthcare professional. If you have questions about a medical condition or this instruction, always ask your healthcare professional. [employer] disclaims any warranty or liability for your use of this information.      Preventive Care Advice   This is general advice given by our system to help you stay healthy. However, your care team may have specific advice just for you. Please talk to your care team about your preventive care needs.  Nutrition  Eat 5 or more servings of fruits and vegetables each day.  Try wheat bread, brown rice and whole grain pasta (instead of white bread, rice, and pasta).  Get enough calcium and vitamin D. Check the label on foods and aim for 100% of the RDA (recommended daily allowance).  Lifestyle  Exercise at least 150 minutes each week  (30 minutes a day, 5 days a week).  Do muscle strengthening activities 2 days a week. These help control your weight and prevent disease.  No smoking.  Wear sunscreen to prevent skin cancer.  Have a dental exam and cleaning every 6 months.  Yearly exams  See your health care team every year to talk about:  Any changes in your health.  Any medicines your care team has prescribed.  Preventive care, family planning, and ways to prevent chronic diseases.  Shots (vaccines)   HPV shots (up to age 26), if you've never had them before.  Hepatitis B shots (up to age 59), if you've never had them before.  COVID-19 shot: Get this shot when it's due.  Flu shot: Get a flu shot " every year.  Tetanus shot: Get a tetanus shot every 10 years.  Pneumococcal, hepatitis A, and RSV shots: Ask your care team if you need these based on your risk.  Shingles shot (for age 50 and up)  General health tests  Diabetes screening:  Starting at age 35, Get screened for diabetes at least every 3 years.  If you are younger than age 35, ask your care team if you should be screened for diabetes.  Cholesterol test: At age 39, start having a cholesterol test every 5 years, or more often if advised.  Bone density scan (DEXA): At age 50, ask your care team if you should have this scan for osteoporosis (brittle bones).  Hepatitis C: Get tested at least once in your life.  STIs (sexually transmitted infections)  Before age 24: Ask your care team if you should be screened for STIs.  After age 24: Get screened for STIs if you're at risk. You are at risk for STIs (including HIV) if:  You are sexually active with more than one person.  You don't use condoms every time.  You or a partner was diagnosed with a sexually transmitted infection.  If you are at risk for HIV, ask about PrEP medicine to prevent HIV.  Get tested for HIV at least once in your life, whether you are at risk for HIV or not.  Cancer screening tests  Cervical cancer screening: If you have a cervix, begin getting regular cervical cancer screening tests starting at age 21.  Breast cancer scan (mammogram): If you've ever had breasts, begin having regular mammograms starting at age 40. This is a scan to check for breast cancer.  Colon cancer screening: It is important to start screening for colon cancer at age 45.  Have a colonoscopy test every 10 years (or more often if you're at risk) Or, ask your provider about stool tests like a FIT test every year or Cologuard test every 3 years.  To learn more about your testing options, visit:   https://www.Tower Vision/501034.pdf.  For help making a decision, visit:   https://bit.ly/al04439.  Prostate cancer screening  test: If you have a prostate, ask your care team if a prostate cancer screening test (PSA) at age 55 is right for you.  Lung cancer screening: If you are a current or former smoker ages 50 to 80, ask your care team if ongoing lung cancer screenings are right for you.  For informational purposes only. Not to replace the advice of your health care provider. Copyright   2023 [facility]. All rights reserved. Clinically reviewed by the [facility] Transitions Program. [employer] [id number] - REV 01/24.

## 2024-02-13 NOTE — LETTER
February 14, 2024      Rosalie Sauceda  [address] [facility] [address]        Dear ,    We are writing to inform you of your test results.    It is a pleasure providing you with medical care. I have received and reviewed your lab results, and have the following recommendations:     Based on the results of your cholesterol, I calculate your future risk for a heart attack in the next 10 years to be less than 10%.  At this time no medication changes are needed, however to limit risk for future heart attack or stroke, it is very important that you: Try to limit your dietary intake of fatty, greasy, oily, fried, take out, and fast food when possible. Also, I would suggest you cut back on red and processed meats, sodium and sugar-sweetened foods and beverages. Regarding exercise, please get at least 30 minutes of CONTINUOUS moderate intensity aerobic exercise at least 3 times per week.     Your basic metabolic panel was within normal limits indicating you do not have any electrolyte or kidney maladies.  Your glucose was within appropriate limits as well.     Resulted Orders   Lipid panel reflex to direct LDL Fasting   Result Value Ref Range    Cholesterol 178 <200 mg/dL    Triglycerides 118 <150 mg/dL    Direct Measure HDL 46 (L) >=50 mg/dL    LDL Cholesterol Calculated 108 (H) <=100 mg/dL    Non HDL Cholesterol 132 (H) <130 mg/dL    Patient Fasting > 8hrs? No     Narrative    Cholesterol  Desirable:  <200 mg/dL    Triglycerides  Normal:  Less than 150 mg/dL  Borderline High:  150-199 mg/dL  High:  200-499 mg/dL  Very High:  Greater than or equal to 500 mg/dL    Direct Measure HDL  Female:  Greater than or equal to 50 mg/dL   Male:  Greater than or equal to 40 mg/dL    LDL Cholesterol  Desirable:  <100mg/dL  Above Desirable:  100-129 mg/dL   Borderline High:  130-159 mg/dL   High:  160-189 mg/dL   Very High:  >= 190 mg/dL    Non HDL Cholesterol  Desirable:  130 mg/dL  Above Desirable:  130-159  mg/dL  Borderline High:  160-189 mg/dL  High:  190-219 mg/dL  Very High:  Greater than or equal to 220 mg/dL   Basic metabolic panel  (Ca, Cl, CO2, Creat, Gluc, K, Na, BUN)   Result Value Ref Range    Sodium 137 135 - 145 mmol/L      Comment:      Reference intervals for this test were updated on 09/26/2023 to more accurately reflect our healthy population. There may be differences in the flagging of prior results with similar values performed with this method. Interpretation of those prior results can be made in the context of the updated reference intervals.     Potassium 3.9 3.4 - 5.3 mmol/L    Chloride 100 98 - 107 mmol/L    Carbon Dioxide (CO2) 26 22 - 29 mmol/L    Anion Gap 11 7 - 15 mmol/L    Urea Nitrogen 15.3 6.0 - 20.0 mg/dL    Creatinine 0.73 0.51 - 0.95 mg/dL    GFR Estimate >90 >60 mL/min/1.73m2    Calcium 10.0 8.6 - 10.0 mg/dL    Glucose 93 70 - 99 mg/dL       If you have any questions or concerns, please call the clinic at the number listed above.       Sincerely,      Dee Pulido MD

## 2024-02-13 NOTE — PROGRESS NOTES
"Preventive Care Visit  [facility]  DINORAH MACDONALD MD, Family Medicine  Feb 13, 2024    Assessment & Plan     Routine general medical examination at a health care facility  - declined vaccines at today's visit   - Basic metabolic panel  (Ca, Cl, CO2, Creat, Gluc, K, Na, BUN)    Encounter for screening mammogram for breast cancer  - *MA Screening Digital Bilateral; Future    Lipid screening  - Lipid panel reflex to direct LDL Fasting    Screen for colon cancer  - Colonoscopy Screening  Referral; Future    Encounter for smoking cessation counseling  > patient had tried lozenge in the past without much success is interested in trying nicotine patches   - nicotine (NICODERM CQ) 21 MG/24HR 24 hr patch; Place 1 patch onto the skin every 24 hours for 14 days  - nicotine (NICODERM CQ) 14 MG/24HR 24 hr patch; Place 1 patch onto the skin every 24 hours for 14 days  - nicotine (NICODERM CQ) 7 MG/24HR 24 hr patch; Place 1 patch onto the skin every 24 hours for 43 days    Floaters in visual field, left  - Adult Eye  Referral; Future    Plantar fasciitis  > had been seen at specialty clinic, but was lost to follow-up   - Orthopedic  Referral; Future      BMI  Estimated body mass index is 30.82 kg/m  as calculated from the following:    Height as of this encounter: 1.707 m (5' 7.21\").    Weight as of this encounter: 89.8 kg (198 lb).       Counseling  Appropriate preventive services were discussed with this patient, including applicable screening as appropriate for fall prevention, nutrition, physical activity, Tobacco-use cessation, weight loss and cognition.  Checklist reviewing preventive services available has been given to the patient.  Reviewed patient's diet, addressing concerns and/or questions.   She is at risk for lack of exercise and has been provided with information to increase physical activity for the benefit of her well-being.     Patient has been advised of " split billing requirements and indicates understanding: Yes      Subjective   Rosalie is a 49 year old, presenting for the following:  Physical and Health Maintenance (Declines vaccinations today)        2/13/2024     2:39 PM   Additional Questions   Roomed by Prachi GUILLORY LPN   Accompanied by self         2/13/2024     2:39 PM   Patient Reported Additional Medications   Patient reports taking the following new medications no new meds        Health Care Directive  Patient does not have a Health Care Directive or Living Will: Discussed advance care planning with patient; information given to patient to review.    HPI    Denies any unintentional weight loss    No hemoptysis   No back so severe it wakes her up at night         2/13/2024   General Health   How would you rate your overall physical health? Good   Feel stress (tense, anxious, or unable to sleep) Rather much   (!) STRESS CONCERN      2/13/2024   Nutrition   Three or more servings of calcium each day? (!) NO   Diet: Regular (no restrictions)   How many servings of fruit and vegetables per day? (!) 0-1   How many sweetened beverages each day? (!) 2         2/13/2024   Exercise   Days per week of moderate/strenous exercise 3 days   Average minutes spent exercising at this level 40 min         2/13/2024   Social Factors   Frequency of gathering with friends or relatives More than three times a week   Worry food won't last until get money to buy more No   Food not last or not have enough money for food? No   Do you have housing?  Yes   Are you worried about losing your housing? No   Lack of transportation? No   Unable to get utilities (heat,electricity)? No         2/13/2024   Dental   Dentist two times every year? Yes         2/13/2024   TB Screening   Were you born outside of [place]?  No         Today's PHQ-2 Score:       2/13/2024     2:36 PM   PHQ-2 ( 1999 [employer])   Q1: Little interest or pleasure in doing things 0   Q2: Feeling down, depressed or hopeless 0    PHQ-2 Score 0   Q1: Little interest or pleasure in doing things Not at all   Q2: Feeling down, depressed or hopeless Not at all   PHQ-2 Score 0           2/13/2024   Substance Use   Alcohol more than 3/day or more than 7/wk No   Do you use any other substances recreationally? (!) BATH SALTS    (!) OTHER     Social History     Tobacco Use    Smoking status: Former     Packs/day: 1.00     Years: 12.00     Additional pack years: 0.00     Total pack years: 12.00     Types: Cigarettes    Smokeless tobacco: Never   Vaping Use    Vaping Use: Every day    Substances: Nicotine, Flavoring    Devices: Disposable   Substance Use Topics    Alcohol use: Yes     Comment: 10 drinks per month- or less.    Drug use: No             2/13/2024   Breast Cancer Screening   Family history of breast, colon, or ovarian cancer? Yes         2/13/2024   LAST FHS-7 RESULTS   1st degree relative breast or ovarian cancer Yes   Any relative bilateral breast cancer Yes   Any male have breast cancer No   Any woman have breast and ovarian cancer No   Any woman with breast cancer before 50yrs No   2 or more relatives with breast and/or ovarian cancer No   2 or more relatives with breast and/or bowel cancer No        Mammogram Screening - Mammogram every 1-2 years updated in Health Maintenance based on mutual decision making        2/13/2024   STI Screening   New sexual partner(s) since last STI/HIV test? No     History of abnormal Pap smear:         Latest Ref Rng & Units 6/2/2022     1:36 PM 2/17/2017    10:07 AM 5/6/2014    12:00 AM   PAP / HPV   PAP  Negative for Intraepithelial Lesion or Malignancy (NILM)      PAP (Historical)   NIL  NIL    HPV 16 DNA Negative Negative  Negative     HPV 18 DNA Negative Negative  Negative     Other HR HPV Negative Negative  Negative       The 10-year ASCVD risk score (Juliet AARON, et al., 2019) is: 1.3%    Values used to calculate the score:      Age: 49 years      Sex: Female      Is Non- :  "No      Diabetic: No      Tobacco smoker: No      Systolic Blood Pressure: 122 mmHg      Is BP treated: No      HDL Cholesterol: 50 mg/dL      Total Cholesterol: 236 mg/dL        2/13/2024   Contraception/Family Planning   Questions about contraception or family planning No        Reviewed and updated as needed this visit by Provider                    Review of Systems   Constitutional:  Negative for chills and fever.   HENT:  Negative for ear pain.    Eyes:  Negative for pain.   Respiratory:  Negative for cough.    Cardiovascular:  Negative for chest pain.   Gastrointestinal:  Negative for abdominal pain.   Genitourinary:  Negative for dysuria.   Musculoskeletal:  Negative for neck pain.   Skin:  Negative for rash.   Neurological:  Negative for headaches.          Objective    Exam  /78 (BP Location: Right arm, Patient Position: Sitting, Cuff Size: Adult Regular)   Pulse 78   Temp 98  F (36.7  C) (Tympanic)   Resp 20   Ht 1.707 m (5' 7.21\")   Wt 89.8 kg (198 lb)   LMP  (LMP Unknown)   SpO2 98%   BMI 30.82 kg/m     Estimated body mass index is 30.82 kg/m  as calculated from the following:    Height as of this encounter: 1.707 m (5' 7.21\").    Weight as of this encounter: 89.8 kg (198 lb).    Physical Exam  Constitutional:       General: She is not in acute distress.  HENT:      Head: Normocephalic and atraumatic.      Right Ear: External ear normal.      Left Ear: External ear normal.      Mouth/Throat:      Mouth: Mucous membranes are moist.      Pharynx: Oropharynx is clear. No oropharyngeal exudate or posterior oropharyngeal erythema.   Eyes:      Extraocular Movements: Extraocular movements intact.   Cardiovascular:      Rate and Rhythm: Normal rate and regular rhythm.      Heart sounds: Normal heart sounds.   Pulmonary:      Effort: Pulmonary effort is normal. No respiratory distress.      Breath sounds: Normal breath sounds. No wheezing or rhonchi.   Abdominal:      Palpations: Abdomen is soft. " There is no mass.      Tenderness: There is no abdominal tenderness.   Musculoskeletal:         General: No deformity. Normal range of motion.      Cervical back: Normal range of motion and neck supple.   Skin:     General: Skin is warm.      Findings: No rash.   Neurological:      General: No focal deficit present.      Mental Status: She is alert and oriented to person, place, and time.   Psychiatric:         Mood and Affect: Mood normal.         Signed Electronically by: DINORAH MACDONALD MD

## 2024-02-20 ENCOUNTER — TELEPHONE (OUTPATIENT)
Dept: DERMATOLOGY | Facility: CLINIC | Age: 49
End: 2024-02-20

## 2024-02-20 NOTE — TELEPHONE ENCOUNTER
Patient Contact    Attempt # 1    Was call answered?  Yes    Called patient. Patient was offered a cancellation appointment with Cheryl. Patient accepted.     Lupe Wilson LPN   [facility]   [phone]

## 2024-02-20 NOTE — TELEPHONE ENCOUNTER
[facility] Call Center    Phone Message    May a detailed message be left on voicemail: no     Reason for Call: Other: Had appt on 2/20/ Charley out.  Pt, [place] 3[place]3/12.  Call to reschedule with STEPHANIE Whitehead.  Rosalie @ [phone] Mole on Right Breast area of concern    Action Taken: Other: [place] [facility]    Travel Screening: Not Applicable

## 2024-02-27 ENCOUNTER — OFFICE VISIT (OUTPATIENT)
Dept: DERMATOLOGY | Facility: CLINIC | Age: 49
End: 2024-02-27
Payer: COMMERCIAL

## 2024-02-27 DIAGNOSIS — D22.9 MULTIPLE BENIGN NEVI: ICD-10-CM

## 2024-02-27 DIAGNOSIS — L82.1 SEBORRHEIC KERATOSIS: ICD-10-CM

## 2024-02-27 DIAGNOSIS — L81.4 LENTIGO: ICD-10-CM

## 2024-02-27 DIAGNOSIS — D18.01 CHERRY ANGIOMA: Primary | ICD-10-CM

## 2024-02-27 PROCEDURE — 99213 OFFICE O/P EST LOW 20 MIN: CPT | Performed by: PHYSICIAN ASSISTANT

## 2024-02-27 ASSESSMENT — PAIN SCALES - GENERAL: PAINLEVEL: NO PAIN (0)

## 2024-02-27 NOTE — NURSING NOTE
Chief Complaint   Patient presents with    Skin Check     Spot on right breast          There were no vitals filed for this visit.  Wt Readings from Last 1 Encounters:   02/13/24 89.8 kg (198 lb)       Lupe Wilson LPN .................2/27/2024

## 2024-02-27 NOTE — LETTER
2/27/2024         RE: Rosalie Sauceda  [address] [facility] [address]        Dear Colleague,    Thank you for referring your patient, Rosalie Sauceda, to the [facility]. Please see a copy of my visit note below.    Rosalie Sauceda is an extremely pleasant 49 year old year old female patient here today for skin check. She notes brown spot on breast she notes was changing. She notes that recently it fell off and hasn't returned. No painful or bleeding skin lesions.  Patient has no other skin complaints today.  Remainder of the HPI, Meds, PMH, Allergies, FH, and SH was reviewed in chart.    Pertinent Hx:   No personal history of skin cancer.   Past Medical History:   Diagnosis Date     Lumbago     back injury in the past       Past Surgical History:   Procedure Laterality Date     C/SECTION, LOW TRANSVERSE  6/2006     LIGATN/STRIP LONG & SHORT SAPHEN  7/2007        Family History   Problem Relation Age of Onset     Cancer Father         skin ca     Diabetes Maternal Grandmother      Breast Cancer Maternal Grandmother         Dx in her 70's     Hypertension Maternal Grandmother      Cancer Maternal Grandfather         LUNG     Prostate Cancer Maternal Grandfather        Social History     Socioeconomic History     Marital status:      Spouse name: Not on file     Number of children: 2     Years of education: Not on file     Highest education level: Not on file   Occupational History     Employer: [employer]   Tobacco Use     Smoking status: Former     Packs/day: 1.00     Years: 29.00     Additional pack years: 0.00     Total pack years: 29.00     Types: Cigarettes     Smokeless tobacco: Never   Vaping Use     Vaping Use: Every day     Substances: Nicotine, Flavoring     Devices: Disposable   Substance and Sexual Activity     Alcohol use: Yes     Comment: 10 drinks per month- or less.     Drug use: No     Sexual activity: Yes     Partners: Male     Birth control/protection:  I.U.D.   Other Topics Concern      Service No     Blood Transfusions No     Caffeine Concern No     Occupational Exposure No     Hobby Hazards No     Sleep Concern No     Stress Concern No     Weight Concern No     Special Diet No     Back Care Yes     Comment: Occasional chiropractor     Exercise No     Bike Helmet No     Seat Belt No     Self-Exams No     Parent/sibling w/ CABG, MI or angioplasty before 65F 55M? No   Social History Narrative     Not on file     Social Determinants of Health     Financial Resource Strain: Low Risk  (2/13/2024)    Financial Resource Strain      Within the past 12 months, have you or your family members you live with been unable to get utilities (heat, electricity) when it was really needed?: No   Food Insecurity: Low Risk  (2/13/2024)    Food Insecurity      Within the past 12 months, did you worry that your food would run out before you got money to buy more?: No      Within the past 12 months, did the food you bought just not last and you didn t have money to get more?: No   Transportation Needs: Low Risk  (2/13/2024)    Transportation Needs      Within the past 12 months, has lack of transportation kept you from medical appointments, getting your medicines, non-medical meetings or appointments, work, or from getting things that you need?: No   Physical Activity: Insufficiently Active (2/13/2024)    Exercise Vital Sign      Days of Exercise per Week: 3 days      Minutes of Exercise per Session: 40 min   Stress: Stress Concern Present (2/13/2024)    [employer] [place] [employer] - Occupational Stress Questionnaire      Feeling of Stress : Rather much   Social Connections: Unknown (2/13/2024)    Social Connection and Isolation Panel [NHANES]      Frequency of Communication with Friends and Family: Not on file      Frequency of Social Gatherings with Friends and Family: More than three times a week      Attends Protestant Services: Not on file      Active Member of  Clubs or Organizations: Not on file      Attends Club or Organization Meetings: Not on file      Marital Status: Not on file   Interpersonal Safety: Low Risk  (2/13/2024)    Interpersonal Safety      Do you feel physically and emotionally safe where you currently live?: Yes      Within the past 12 months, have you been hit, slapped, kicked or otherwise physically hurt by someone?: No      Within the past 12 months, have you been humiliated or emotionally abused in other ways by your partner or ex-partner?: No   Housing Stability: Low Risk  (2/13/2024)    Housing Stability      Do you have housing? : Yes      Are you worried about losing your housing?: No       Outpatient Encounter Medications as of 2/27/2024   Medication Sig Dispense Refill     acetaminophen (TYLENOL) 500 MG tablet Take 500-1,000 mg by mouth every 6 hours as needed for mild pain       levonorgestrel (MIRENA) 52 MG (20 mcg/day) IUD by Intrauterine route once       [START ON 3/28/2024] nicotine (NICODERM CQ) 14 MG/24HR 24 hr patch Place 1 patch onto the skin every 24 hours for 14 days 14 patch 0     [START ON 4/12/2024] nicotine (NICODERM CQ) 21 MG/24HR 24 hr patch Place 1 patch onto the skin every 24 hours for 14 days 14 patch 0     nicotine (NICODERM CQ) 7 MG/24HR 24 hr patch Place 1 patch onto the skin every 24 hours for 43 days 43 patch 0     valACYclovir (VALTREX) 1000 mg tablet TAKE 1 TABLET BY MOUTH TWICE DAILY FOR 3 DAYS       No facility-administered encounter medications on file as of 2/27/2024.             O:   NAD, WDWN, Alert & Oriented, Mood & Affect wnl, Vitals stable   Here today alone   LMP  (LMP Unknown)    General appearance normal   Vitals stable   Alert, oriented and in no acute distress     Stuck on papules and brown macules on trunk and ext   Red papules on trunk  Brown papules and macules with regular pigment network and borders on torso and extremities     The remainder of skin exam is normal       Eyes:  Conjunctivae/lids:Normal     ENT: Lips: normal    MSK:Normal    Cardiovascular: peripheral edema none    Pulm: Breathing Normal    Neuro/Psych: Orientation:Alert and Orientedx3 ; Mood/Affect:normal   A/P:  1. Seborrheic keratosis, lentigo, angioma, benign nevi   It was a pleasure speaking to Rosalie Sauceda today.  BENIGN LESIONS DISCUSSED WITH PATIENT:  I discussed the specifics of tumor, prognosis, and genetics of benign lesions.  I explained that treatment of these lesions would be purely cosmetic and not medically neccessary.  I discussed with patient different removal options including excision, cautery and /or laser.      Nature and genetics of benign skin lesions dicussed with patient.  Signs and Symptoms of skin cancer discussed with patient.  ABCDEs of melanoma reviewed with patient.  Patient encouraged to perform monthly skin exams.  UV precautions reviewed with patient.  Risks of non-melanoma skin cancer discussed with patient   Return to clinic in 1-2 years or sooner if needed.       Again, thank you for allowing me to participate in the care of your patient.        Sincerely,        Cheryl Wagner PA-C

## 2024-02-28 NOTE — PROGRESS NOTES
Rosalie Sauceda is an extremely pleasant 49 year old year old female patient here today for skin check. She notes brown spot on breast she notes was changing. She notes that recently it fell off and hasn't returned. No painful or bleeding skin lesions.  Patient has no other skin complaints today.  Remainder of the HPI, Meds, PMH, Allergies, FH, and SH was reviewed in chart.    Pertinent Hx:   No personal history of skin cancer.   Past Medical History:   Diagnosis Date    Lumbago     back injury in the past       Past Surgical History:   Procedure Laterality Date    C/SECTION, LOW TRANSVERSE  6/2006    LIGATN/STRIP LONG & SHORT SAPHEN  7/2007        Family History   Problem Relation Age of Onset    Cancer Father         skin ca    Diabetes Maternal Grandmother     Breast Cancer Maternal Grandmother         Dx in her 70's    Hypertension Maternal Grandmother     Cancer Maternal Grandfather         LUNG    Prostate Cancer Maternal Grandfather        Social History     Socioeconomic History    Marital status:      Spouse name: Not on file    Number of children: 2    Years of education: Not on file    Highest education level: Not on file   Occupational History     Employer: [employer]   Tobacco Use    Smoking status: Former     Packs/day: 1.00     Years: 29.00     Additional pack years: 0.00     Total pack years: 29.00     Types: Cigarettes    Smokeless tobacco: Never   Vaping Use    Vaping Use: Every day    Substances: Nicotine, Flavoring    Devices: Disposable   Substance and Sexual Activity    Alcohol use: Yes     Comment: 10 drinks per month- or less.    Drug use: No    Sexual activity: Yes     Partners: Male     Birth control/protection: I.U.D.   Other Topics Concern     Service No    Blood Transfusions No    Caffeine Concern No    Occupational Exposure No    Hobby Hazards No    Sleep Concern No    Stress Concern No    Weight Concern No    Special Diet No    Back Care Yes     Comment: Occasional  chiropractor    Exercise No    Bike Helmet No    Seat Belt No    Self-Exams No    Parent/sibling w/ CABG, MI or angioplasty before 65F 55M? No   Social History Narrative    Not on file     Social Determinants of Health     Financial Resource Strain: Low Risk  (2/13/2024)    Financial Resource Strain     Within the past 12 months, have you or your family members you live with been unable to get utilities (heat, electricity) when it was really needed?: No   Food Insecurity: Low Risk  (2/13/2024)    Food Insecurity     Within the past 12 months, did you worry that your food would run out before you got money to buy more?: No     Within the past 12 months, did the food you bought just not last and you didn t have money to get more?: No   Transportation Needs: Low Risk  (2/13/2024)    Transportation Needs     Within the past 12 months, has lack of transportation kept you from medical appointments, getting your medicines, non-medical meetings or appointments, work, or from getting things that you need?: No   Physical Activity: Insufficiently Active (2/13/2024)    Exercise Vital Sign     Days of Exercise per Week: 3 days     Minutes of Exercise per Session: 40 min   Stress: Stress Concern Present (2/13/2024)    [place] [place] [employer] - Occupational Stress Questionnaire     Feeling of Stress : Rather much   Social Connections: Unknown (2/13/2024)    Social Connection and Isolation Panel [NHANES]     Frequency of Communication with Friends and Family: Not on file     Frequency of Social Gatherings with Friends and Family: More than three times a week     Attends Religion Services: Not on file     Active Member of Clubs or Organizations: Not on file     Attends Club or Organization Meetings: Not on file     Marital Status: Not on file   Interpersonal Safety: Low Risk  (2/13/2024)    Interpersonal Safety     Do you feel physically and emotionally safe where you currently live?: Yes     Within the past 12  months, have you been hit, slapped, kicked or otherwise physically hurt by someone?: No     Within the past 12 months, have you been humiliated or emotionally abused in other ways by your partner or ex-partner?: No   Housing Stability: Low Risk  (2/13/2024)    Housing Stability     Do you have housing? : Yes     Are you worried about losing your housing?: No       Outpatient Encounter Medications as of 2/27/2024   Medication Sig Dispense Refill    acetaminophen (TYLENOL) 500 MG tablet Take 500-1,000 mg by mouth every 6 hours as needed for mild pain      levonorgestrel (MIRENA) 52 MG (20 mcg/day) IUD by Intrauterine route once      [START ON 3/28/2024] nicotine (NICODERM CQ) 14 MG/24HR 24 hr patch Place 1 patch onto the skin every 24 hours for 14 days 14 patch 0    [START ON 4/12/2024] nicotine (NICODERM CQ) 21 MG/24HR 24 hr patch Place 1 patch onto the skin every 24 hours for 14 days 14 patch 0    nicotine (NICODERM CQ) 7 MG/24HR 24 hr patch Place 1 patch onto the skin every 24 hours for 43 days 43 patch 0    valACYclovir (VALTREX) 1000 mg tablet TAKE 1 TABLET BY MOUTH TWICE DAILY FOR 3 DAYS       No facility-administered encounter medications on file as of 2/27/2024.             O:   NAD, WDWN, Alert & Oriented, Mood & Affect wnl, Vitals stable   Here today alone   LMP  (LMP Unknown)    General appearance normal   Vitals stable   Alert, oriented and in no acute distress     Stuck on papules and brown macules on trunk and ext   Red papules on trunk  Brown papules and macules with regular pigment network and borders on torso and extremities     The remainder of skin exam is normal       Eyes: Conjunctivae/lids:Normal     ENT: Lips: normal    MSK:Normal    Cardiovascular: peripheral edema none    Pulm: Breathing Normal    Neuro/Psych: Orientation:Alert and Orientedx3 ; Mood/Affect:normal   A/P:  1. Seborrheic keratosis, lentigo, angioma, benign nevi   It was a pleasure speaking to Rosalie Sauceda today.  BENIGN  LESIONS DISCUSSED WITH PATIENT:  I discussed the specifics of tumor, prognosis, and genetics of benign lesions.  I explained that treatment of these lesions would be purely cosmetic and not medically neccessary.  I discussed with patient different removal options including excision, cautery and /or laser.      Nature and genetics of benign skin lesions dicussed with patient.  Signs and Symptoms of skin cancer discussed with patient.  ABCDEs of melanoma reviewed with patient.  Patient encouraged to perform monthly skin exams.  UV precautions reviewed with patient.  Risks of non-melanoma skin cancer discussed with patient   Return to clinic in 1-2 years or sooner if needed.

## 2024-03-01 DIAGNOSIS — I83.813 VARICOSE VEINS OF LEG WITH PAIN, BILATERAL: Primary | ICD-10-CM

## 2024-03-22 ENCOUNTER — TELEPHONE (OUTPATIENT)
Dept: CALL CENTER | Age: 49
End: 2024-03-22
Payer: COMMERCIAL

## 2024-03-22 NOTE — TELEPHONE ENCOUNTER
Ok for first available general/optometery    Single floater times 2-3 months per note.    Note to patient communicator to assist in scheduling.    May direct back to triage if having any other symptoms/vision changes/concerns.    Rj Montoya RN 4:23 PM 03/22/24

## 2024-03-22 NOTE — TELEPHONE ENCOUNTER
Health Call Center    Phone Message    May a detailed message be left on voicemail: yes     Reason for Call: Appointment Intake    Referring Provider Name: Dee Pulido MD in [facility]  Diagnosis and/or Symptoms: Floaters in visual field, left [H43.392], seeing a black spot in her left eye, that is always in the same spot around the 3 oclock position, comes and goes, this has been present for the last 2-3 months    Per protocol to send urgent TE for floaters. Please call patient back at [phone]. Thank you.    Action Taken: Message routed to:  [facility]    Travel Screening: Not Applicable

## 2024-03-25 NOTE — TELEPHONE ENCOUNTER
Called and spoke to rhea     She would like to be seen at the [facility] if possible - closer to home     Ok for first available general/optometery per nurse review     Christie Win Communication Facilitator on 3/25/2024 at 1:40 PM

## 2024-03-25 NOTE — TELEPHONE ENCOUNTER
LVM - [facility] team - please schedule pt tomorrow with Dr. Aden in NP appt spots if still open. No need to route msg to us unless day/time does not work.  Thank you.

## 2024-03-26 ENCOUNTER — OFFICE VISIT (OUTPATIENT)
Dept: OPTOMETRY | Facility: CLINIC | Age: 49
End: 2024-03-26
Attending: STUDENT IN AN ORGANIZED HEALTH CARE EDUCATION/TRAINING PROGRAM
Payer: COMMERCIAL

## 2024-03-26 DIAGNOSIS — H43.392 FLOATER, VITREOUS, LEFT: Primary | ICD-10-CM

## 2024-03-26 PROCEDURE — 99203 OFFICE O/P NEW LOW 30 MIN: CPT | Performed by: OPTOMETRIST

## 2024-03-26 ASSESSMENT — VISUAL ACUITY
OS_CC: 20/25
CORRECTION_TYPE: CONTACTS
OD_CC: 20/25
METHOD: SNELLEN - LINEAR

## 2024-03-26 ASSESSMENT — CONF VISUAL FIELD
OS_INFERIOR_NASAL_RESTRICTION: 0
METHOD: COUNTING FINGERS
OD_SUPERIOR_NASAL_RESTRICTION: 0
OS_SUPERIOR_TEMPORAL_RESTRICTION: 0
OD_SUPERIOR_TEMPORAL_RESTRICTION: 0
OS_INFERIOR_TEMPORAL_RESTRICTION: 0
OS_NORMAL: 1
OD_INFERIOR_TEMPORAL_RESTRICTION: 0
OD_INFERIOR_NASAL_RESTRICTION: 0
OD_NORMAL: 1
OS_SUPERIOR_NASAL_RESTRICTION: 0

## 2024-03-26 ASSESSMENT — REFRACTION_CURRENTRX
OS_CYLINDER: ?
OD_DIAMETER: ?
OD_BASECURVE: ?
OS_BRAND: MONTHLY
OD_SPHERE: ?
OD_AXIS: ?
OS_AXIS: ?
OD_CYLINDER: ?
OD_BRAND: MONTHLY
OS_DIAMETER: ?
OS_BASECURVE: ?
OS_SPHERE: ?

## 2024-03-26 ASSESSMENT — EXTERNAL EXAM - RIGHT EYE: OD_EXAM: NORMAL

## 2024-03-26 ASSESSMENT — SLIT LAMP EXAM - LIDS
COMMENTS: NORMAL
COMMENTS: NORMAL

## 2024-03-26 ASSESSMENT — CUP TO DISC RATIO
OD_RATIO: 0.2
OS_RATIO: 0.2

## 2024-03-26 ASSESSMENT — TONOMETRY
OD_IOP_MMHG: 16
IOP_METHOD: APPLANATION
OS_IOP_MMHG: 18

## 2024-03-26 ASSESSMENT — EXTERNAL EXAM - LEFT EYE: OS_EXAM: NORMAL

## 2024-03-26 NOTE — PROGRESS NOTES
Chief Complaint   Patient presents with    Floaters     Floaters  In left eye. Single floater. Black. Intermittent. Characterized as small gnat-like. Duration of months. Since onset it is stable. No trauma to eyes/head. No hx of similar issues. Also noticing more glare while driving at night and wondering if they are related.     (-)Flashes, (-)Loss of vision/curtain effect     Had a CE at [facility] a few months ago and symptoms were present before then but were not addressed at that appt.       OK to dilate    Do you wear contact lenses? Yes - monthly lens ; wears most of the time. Has glasses too but rarely wears. Uses OTC readers over CLs as needed.      Julee Fisher  Optometry Assistant       Medical, surgical and family histories reviewed and updated 3/26/2024.         OBJECTIVE: See Ophthalmology exam    ASSESSMENT:    ICD-10-CM    1. Floater, vitreous, left  H43.392          PLAN:    Patient Instructions   Floaters are small specks or clouds that move in your vision. They may appear to dart away when you try to look directly at them. They are most noticeable in bright light when looking at a blank wall, a solid colored surface, or the radha.    These happen with age as the vitreous gel ( the fluid that fills the eyeball), starts to become more liquified and shrinks to form clumps or strands. The gel then pulls away from the back of the retina leaving a clump of tissue where the vitreous was previously attached, causing a posterior vitreous detachment.   This is more common with people that are nearsighted or have undergone certain ocular surgeries, inflammatory conditions or experienced trauma.  You should always be checked by an eye doctor right away if you have a sudden change in floaters, flashes, or change/ loss in peripheral vision. This could indicate a retinal tear, hole or detachment that could lead to permanent vision loss if not treated.      The effects of the dilating drops last for 4- 6  hours.  You will be more sensitive to light and vision will be blurry up close.  Mydriatic sunglasses were given if needed.     Alexander Aden, [facility]  [address] [place] [address]. K[place] Borja  [id number] [phone]

## 2024-03-26 NOTE — LETTER
3/26/2024         RE: Rosalie Sauceda  [address] [facility] [id number]        Dear Colleague,    Thank you for referring your patient, Rosalie Sauceda, to the [facility]. Please see a copy of my visit note below.    Chief Complaint   Patient presents with     Floaters     Floaters  In left eye. Single floater. Black. Intermittent. Characterized as small gnat-like. Duration of months. Since onset it is stable. No trauma to eyes/head. No hx of similar issues. Also noticing more glare while driving at night and wondering if they are related.     (-)Flashes, (-)Loss of vision/curtain effect     Had a CE at [facility] a few months ago and symptoms were present before then but were not addressed at that appt.       OK to dilate    Do you wear contact lenses? Yes - monthly lens ; wears most of the time. Has glasses too but rarely wears. Uses OTC readers over CLs as needed.      Julee Fisher  Optometry Assistant       Medical, surgical and family histories reviewed and updated 3/26/2024.         OBJECTIVE: See Ophthalmology exam    ASSESSMENT:    ICD-10-CM    1. Floater, vitreous, left  H43.392          PLAN:    Patient Instructions   Floaters are small specks or clouds that move in your vision. They may appear to dart away when you try to look directly at them. They are most noticeable in bright light when looking at a blank wall, a solid colored surface, or the radha.    These happen with age as the vitreous gel ( the fluid that fills the eyeball), starts to become more liquified and shrinks to form clumps or strands. The gel then pulls away from the back of the retina leaving a clump of tissue where the vitreous was previously attached, causing a posterior vitreous detachment.   This is more common with people that are nearsighted or have undergone certain ocular surgeries, inflammatory conditions or experienced trauma.  You should always be checked by an eye doctor right away if  you have a sudden change in floaters, flashes, or change/ loss in peripheral vision. This could indicate a retinal tear, hole or detachment that could lead to permanent vision loss if not treated.      The effects of the dilating drops last for 4- 6 hours.  You will be more sensitive to light and vision will be blurry up close.  Mydriatic sunglasses were given if needed.     Alexander Aden OD  [address] [place]  [address]    [phone]       Again, thank you for allowing me to participate in the care of your patient.        Sincerely,        Alexander Aden OD

## 2024-03-26 NOTE — PATIENT INSTRUCTIONS
Floaters are small specks or clouds that move in your vision. They may appear to dart away when you try to look directly at them. They are most noticeable in bright light when looking at a blank wall, a solid colored surface, or the radha.    These happen with age as the vitreous gel ( the fluid that fills the eyeball), starts to become more liquified and shrinks to form clumps or strands. The gel then pulls away from the back of the retina leaving a clump of tissue where the vitreous was previously attached, causing a posterior vitreous detachment.   This is more common with people that are nearsighted or have undergone certain ocular surgeries, inflammatory conditions or experienced trauma.  You should always be checked by an eye doctor right away if you have a sudden change in floaters, flashes, or change/ loss in peripheral vision. This could indicate a retinal tear, hole or detachment that could lead to permanent vision loss if not treated.      The effects of the dilating drops last for 4- 6 hours.  You will be more sensitive to light and vision will be blurry up close.  Mydriatic sunglasses were given if needed.     Alexander Aden, OD  [facility]  [address] [place] [address]. [place]  [address]    [phone]

## 2024-03-29 ENCOUNTER — ANCILLARY PROCEDURE (OUTPATIENT)
Dept: ULTRASOUND IMAGING | Facility: CLINIC | Age: 49
End: 2024-03-29
Attending: SPECIALIST
Payer: COMMERCIAL

## 2024-03-29 DIAGNOSIS — I83.813 VARICOSE VEINS OF LEG WITH PAIN, BILATERAL: ICD-10-CM

## 2024-03-29 PROCEDURE — 93970 EXTREMITY STUDY: CPT | Performed by: SURGERY

## 2024-04-05 ENCOUNTER — OFFICE VISIT (OUTPATIENT)
Dept: FAMILY MEDICINE | Facility: CLINIC | Age: 49
End: 2024-04-05
Payer: COMMERCIAL

## 2024-04-05 ENCOUNTER — MYC MEDICAL ADVICE (OUTPATIENT)
Dept: FAMILY MEDICINE | Facility: CLINIC | Age: 49
End: 2024-04-05

## 2024-04-05 ENCOUNTER — ANCILLARY PROCEDURE (OUTPATIENT)
Dept: GENERAL RADIOLOGY | Facility: CLINIC | Age: 49
End: 2024-04-05
Attending: NURSE PRACTITIONER
Payer: COMMERCIAL

## 2024-04-05 VITALS
RESPIRATION RATE: 16 BRPM | BODY MASS INDEX: 31.08 KG/M2 | HEART RATE: 78 BPM | SYSTOLIC BLOOD PRESSURE: 121 MMHG | TEMPERATURE: 97.5 F | WEIGHT: 198 LBS | DIASTOLIC BLOOD PRESSURE: 78 MMHG | HEIGHT: 67 IN | OXYGEN SATURATION: 98 %

## 2024-04-05 DIAGNOSIS — M54.41 ACUTE BILATERAL LOW BACK PAIN WITH BILATERAL SCIATICA: ICD-10-CM

## 2024-04-05 DIAGNOSIS — M54.41 ACUTE BILATERAL LOW BACK PAIN WITH BILATERAL SCIATICA: Primary | ICD-10-CM

## 2024-04-05 DIAGNOSIS — M54.42 ACUTE BILATERAL LOW BACK PAIN WITH BILATERAL SCIATICA: Primary | ICD-10-CM

## 2024-04-05 DIAGNOSIS — M54.42 ACUTE BILATERAL LOW BACK PAIN WITH BILATERAL SCIATICA: ICD-10-CM

## 2024-04-05 PROCEDURE — 72100 X-RAY EXAM L-S SPINE 2/3 VWS: CPT | Mod: TC | Performed by: RADIOLOGY

## 2024-04-05 PROCEDURE — 99214 OFFICE O/P EST MOD 30 MIN: CPT | Performed by: NURSE PRACTITIONER

## 2024-04-05 RX ORDER — PREDNISONE 20 MG/1
40 TABLET ORAL DAILY
Qty: 10 TABLET | Refills: 0 | Status: SHIPPED | OUTPATIENT
Start: 2024-04-05 | End: 2024-04-10

## 2024-04-05 RX ORDER — HYDROCODONE BITARTRATE AND ACETAMINOPHEN 5; 325 MG/1; MG/1
1 TABLET ORAL EVERY 6 HOURS PRN
Qty: 10 TABLET | Refills: 0 | Status: SHIPPED | OUTPATIENT
Start: 2024-04-05 | End: 2024-04-08

## 2024-04-05 RX ORDER — CYCLOBENZAPRINE HCL 5 MG
10 TABLET ORAL 2 TIMES DAILY PRN
Qty: 25 TABLET | Refills: 0 | Status: SHIPPED | OUTPATIENT
Start: 2024-04-05

## 2024-04-05 ASSESSMENT — PAIN SCALES - GENERAL: PAINLEVEL: EXTREME PAIN (9)

## 2024-04-05 NOTE — PROGRESS NOTES
"  Assessment & Plan     Acute bilateral low back pain with bilateral sciatica  Acute back pain with intermittent bilateral sciatica not improving with routine chiropractic care. Discussed starting cyclobenzaprine BID and prednisone daily to help decrease muscle spasms and inflammation. Reviewed different stretches to complete at home to help back pain. Recommended patient to follow with physical therapy to assist with significance of pain and to gather additional prevention strategies. Will obtain a X-Ray as patient has a hx of frequent acute back pain.     Due to extreme, acute pain, Norco was ordered to help provide minor relief. Educated patient on the risks and adverse effects of narcotics. Discussed with patient to only use when pain is uncontrollable and not resolving with other non-pharmalogical or pharmalogical interventions. Patient verbalized understanding.     Patient should contact PCP if back pain worsens or she begins to develops red flag symptoms.     - cyclobenzaprine (FLEXERIL) 5 MG tablet  Dispense: 25 tablet; Refill: 0  - predniSONE (DELTASONE) 20 MG tablet  Dispense: 10 tablet; Refill: 0  - HYDROcodone-acetaminophen (NORCO) 5-325 MG tablet  Dispense: 10 tablet; Refill: 0  - Physical Therapy  Referral  - XR Lumbar Spine 2/3 Views      BMI  Estimated body mass index is 30.71 kg/m  as calculated from the following:    Height as of this encounter: 1.71 m (5' 7.32\").    Weight as of this encounter: 89.8 kg (198 lb).       Agnieszka Wiggins is a 49 year old, presenting for the following health issues:  Back Pain        4/5/2024    12:51 PM   Additional Questions   Roomed by Belen ANDRES   Accompanied by self     History of Present Illness       Back Pain:  She presents for follow up of back pain. Patient's back pain is a recurring problem.  Location of back pain:  Right lower back, left lower back, right buttock, left buttock, right hip, left hip, right side of waist and left side of " waist  Description of back pain: cramping, sharp, shooting and stabbing  Back pain spreads: right buttocks, left buttocks, right thigh and left thigh    Since patient first noticed back pain, pain is: always present, but gets better and worse  Does back pain interfere with her job:  Yes       She eats 0-1 servings of fruits and vegetables daily.She consumes 2 sweetened beverage(s) daily.She exercises with enough effort to increase her heart rate 30 to 60 minutes per day.  She exercises with enough effort to increase her heart rate 3 or less days per week.   She is taking medications regularly.     Pain History:  When did you first notice your pain? Threw it out on Saturday   Have you seen anyone else for your pain? No  How has your pain affected your ability to work? Can work part time with limitations   What type of work do you or did you do?   Where in your body do you have pain? Back Pain  Onset/Duration: Saturday   Description:   Location of pain: low back both  Character of pain: constant  Pain radiation: radiates into the right buttocks, radiates into the right leg, radiates into the left buttocks, and radiates into the left leg  New numbness or weakness in legs, not attributed to pain: no   Intensity: Currently 9/10  Progression of Symptoms: a little better  History:   Specific cause: getting into bathtub  Pain interferes with job: YES  History of back problems: recurrent self limited episodes of low back pain in the past  Any previous MRI or X-rays: None  Sees a specialist for back pain: No   Alleviating factors:   Improved by: chiropractor, cold, heat, massage, and rest    Precipitating factors:  Worsened by: any movement   Therapies tried and outcome: chiropractor, cold, heat, massage, and rest. Has not improved pain     Accompanying Signs & Symptoms:  Risk of Fracture: None  Risk of Cauda Equina: None  Risk of Infection: None  Risk of Cancer: None  Risk of Ankylosing Spondylitis: Onset at age  "<35, male, AND morning back stiffness No      Significant lower back pain with bilateral pain shooting into BLE. States the right side hurts worse than the left.   Has a history of frequently \"throwing out the lower back\". In the past, used to throw her back out about every 6 months. When this has happened in the past, she normally goes to the chiropractor two times and her back goes back to normal. Currently, she has went to the chiropractor twice and has not had any improvement in her pain or mobility. She is having difficultly moving around and working. Pain causes her to tear.     Review of Systems  Constitutional, neuro, ENT, endocrine, pulmonary, cardiac, gastrointestinal, genitourinary, musculoskeletal, integument and psychiatric systems are negative, except as otherwise noted.      Objective    /78 (BP Location: Right arm, Patient Position: Sitting, Cuff Size: Adult Large)   Pulse 78   Temp 97.5  F (36.4  C) (Tympanic)   Resp 16   Ht 1.71 m (5' 7.32\")   Wt 89.8 kg (198 lb)   LMP  (LMP Unknown)   SpO2 98%   BMI 30.71 kg/m    Body mass index is 30.71 kg/m .  Physical Exam   GENERAL: alert, uncomfortable, fatigued, tearful, and mild distress with movement  MS: normal muscle tone, decreased range of motion in the lower back and bilateral hips, no edema, peripheral pulses normal, tenderness to palpation on the sacrum, and spine exam shows decreased ROM     ORTHO  Cervical Spine Exam: Inspection: normal cervical lordosis  Non-tender:  normal musculature  Range of Motion:  Full ROM of cervical spine  Strength: Full strength of all neck muscles    Hip Exam: Hip ROM decreased  Comprehensive back pain exam:  Tenderness of right and left lower back with movement, Pain limits the following motions: ambulation, flexion, extension, and lateral rotation, and supine Straight leg raise positive bilaterally, seated straight leg raise negative bilaterally. . Anterior rotation noted of the right hip with standing " and laying down.     Xray completed and reviewed personally. Arthritis changes noted. Slight lumbar spine curvature present    XR Lumbar Spine 2/3 Views    Result Date: 4/5/2024  XR LUMBAR SPINE 2/3 VIEWS  4/5/2024 2:13 PM HISTORY: chronic low back pain with acute spasm; Acute bilateral low back pain with bilateral sciatica; Acute bilateral low back pain with bilateral sciatica COMPARISON: None.     IMPRESSION: Left convex curvature centered at the thoracolumbar junction. No loss of vertebral body height. Multilevel disc height loss and osteophyte formation. SHAI VARGAS MD   SYSTEM ID:  [id number]          Signed Electronically by: ANDREW Combs CNP, [employer], DNP, FNP student

## 2024-04-30 ENCOUNTER — OFFICE VISIT (OUTPATIENT)
Dept: VASCULAR SURGERY | Facility: CLINIC | Age: 49
End: 2024-04-30
Payer: COMMERCIAL

## 2024-04-30 DIAGNOSIS — I83.813 VARICOSE VEINS OF LEG WITH PAIN, BILATERAL: Primary | ICD-10-CM

## 2024-04-30 PROCEDURE — 99213 OFFICE O/P EST LOW 20 MIN: CPT | Performed by: SPECIALIST

## 2024-04-30 NOTE — PATIENT INSTRUCTIONS
Pre-Procedure Instructions:                           VNUS Closure and Phlebectomies   You are having a Closure(s) - where one or more veins are closed and Phlebectomies - where one or more veins are removed.   Insurance  Precertification and/or referral authorization may be required by your insurance company.  We will call your insurance company to verify benefits for the medically necessary part of your procedure.    Your Current Medications and Allergies  To reduce bruising, please do not take aspirin-type medications (Motrin, Aleve, Ibuprofen, Advil, etc.) for three days before your procedure. You may take Tylenol if you need a pain reliever.  Are you on blood thinner medications? (Plavix, Coumadin, Eliquis, Xarelto) Please discuss this with your surgeon. You may resume taking your blood thinner medication after your procedure.  Are you sensitive to latex or adhesives used for fake fingernails? Please let us know!    Driving Escort and   Please arrange to have a trusted adult (18 years old or older) drive you to and from the clinic.  For your safety, we recommend you have a trusted adult to stay with you until the next morning.    Your Health  If you have a change in your health before the procedure, contact our office immediately. (For example: cold symptoms, cough, urinary tract infection, fever, flu symptoms.)  A pre-procedure physical is not required.    Note  It is sometimes necessary to adjust the procedure schedule due to emergencies. We greatly appreciate your flexibility and understanding in this matter.                  Check List: The Morning of Your Procedure  ___1. Please do not put anything on your leg(s) or shave the day of your procedure.  ___2. You may take your normal medications the day of your procedure.  ___3. It is recommended you eat a light breakfast or lunch the day of your procedure.  ___4. Wear comfortable loose-fitting clothing and wide-fitting shoes (i.e. tennis shoes,  slip-ons).  ___5. Please arrive at our clinic at the specified time given by the nurse.  ___6. You will sign an affirmation of informed consent.  ___7. Bring your pre-procedure sedation medication (lorazepam and clonidine) with you to the clinic.              One hour before your procedure, you will be instructed to take these medications. The lorazepam              (Ativan) lowers anxiety and sedates you; the clonidine makes the lorazepam more effective. Everyone's              body processes these medications differently. Therefore, reactions to these medications vary. Some              people stay awake and some people sleep through the whole procedure. You may not remember              everything about the procedure or the day. You do not want to make any big decisions for the rest of the              day.    The Day of Your Procedure:       VNUS Closure and Phlebectomies  In the Exam Room  A nurse will bring you back to an exam room with your family member or friend. This is when your informed consent will be signed, and you will take your pre-procedure medications.  You will be asked to remove everything from the waist down, including undergarments. You will then put on a hospital gown or shorts and blue booties.  Your surgeon will come in to answer any questions and kashmir any bulging varicose veins to be removed.  You will be taken to the restroom to empty your bladder before going into the procedure room.    In the Procedure Room  You will be escorted to the procedure room. You will lie on a procedure table covered with a sheet or blanket.  A nurse will put a blood pressure cuff on your arm and a pulse/oxygen monitor on a finger. Your vital signs will be monitored every 15 minutes.  Your gown will be pulled up slightly and the groin exposed for a short period of time. The surgeon's assistant will clean your foot, leg, and groin with an antibacterial solution. We will get you covered up as quickly as  possible!  Sterile towels and blue drapes will be used to cover you and the table. You will be asked to keep your hands under the blue drapes during the procedure.  The lights will be turned down. The table will be tipped so your head is higher than your feet. You may feel like you're going to slide off, but you won't.    The Procedure  The surgeon will visualize your veins with an ultrasound machine. He or she will then numb your skin and access the vein. A catheter is passed up the vein and positioned with ultrasound guidance. The table will then be tipped head down.  Once the catheter is in the correct position, medication will be injected to numb your leg. You will feel some needle sticks and may feel discomfort as the medication goes in. Once this is done, you should not experience significant discomfort. But if you do, please let us know and more numbing medication can be injected. As the catheter sends out heat, the vein closes off and the catheter is withdrawn.  For the phlebectomy part of the procedure, small incisions are made where the bulging varicose veins have been marked on your leg(s) and these veins will be removed using a small katelynn hook instrument.    Post-Procedure  Once the procedure is done, your leg(s) will be washed with warm water and dried. Your leg(s) will be bandaged with large soft dressings and a large ACE bandage wrapped from toes to groin.   You will be offered something to drink and a light snack.  You will rest with your leg(s) elevated for approximately 30 minutes. Your friend or family member may join you.  For your safety, you will be taken to your car in a wheelchair. If you are able to, it is good to keep your leg(s) elevated on the car ride home.    Post-Procedure Instructions:             VNUS Closure and Phlebectomies      Post-Op Day Zero - The Day of Your Procedure:0  1. Medication for Pain Control and Inflammation Control   - The numbing medication injected during your  procedure will last for several hours. The pre-procedure                 tablets may make you very sleepy and you might not remember everything from the procedure or from                 the day. This will usually wear off by the next day.   - Ibuprofen:  If tolerated, take ibuprofen (e.g., Advil) to reduce inflammation whether or not you have                 pain. For three days, take two tablets (200 mg each) with every meal and at bedtime with a snack. If                 your pain is not controlled with ibuprofen, you may take prescription pain medication (such as Norco),                 if prescribed.   - You may resume taking any medications you were taking before your procedure.  2. Activity   - Rest with your leg(s) elevated above your heart. This will prevent from a lot of swelling and                 bleeding. You do not need to elevate your leg(s) while sleeping at night. You may go upstairs, sit up to                 eat, use the bathroom, and take several five-minute walks. Otherwise, keep your leg(s) elevated.                 Minimize the amount of time you are up on your feet to about 30 minutes at a time.  3. Bandages   - The incision sites will be covered with soft bandages and an ACE wrap. Keep your bandages on                 and dry for 48 hours. The ACE should provide  snug  compression but should not cause pain or                 numbness in the toes. If you have significant discomfort or your toes become cold or numb, unwrap                 your ACE and rewrap with less tension starting at the toes wrapping upward.  4. Incisions   - Bleeding: You may see some incision sites that are oozing through the bandages. This is not unusual      and can be managed with Rest, Ice, Compression and Elevation (RICE). Apply ice and firm pressure      directly to the site that is bleeding and rest with your leg(s) elevated above your heart for 20-30                 minutes.    Post-Op Day One:  1.  Medication   - Ibuprofen: Continue the same as the Day of Your Procedure. If your pain is not controlled with                 ibuprofen, you may take prescription pain medication (such as Norco), if prescribed.   2. Activity   - We would like you to get up at least six times and walk around for short periods of time, unless it is      causing you pain. You should not be on your feet more than 90 minutes at a time. Elevate your leg      above your heart when you are not walking.  3. Bandages   - Your bandages must be kept on and dry for 48 hours.  4. Driving   - You may resume driving when you can do so safely. Do not drive if you are taking narcotic pain      medication.  Post-Op Day Two:   1. Medication  - Ibuprofen: Continue the same as the Day of Your Procedure.  2.  Activity  - Walk as tolerated. Elevate as much as possible when not walking.  3. Bandages and Compression  - Remove ACE wrap and padding. Shower and put on your compression hose during waking hours only       for at least 5 days. (Your doctor may instruct you to keep your bandages on until your return     appointment; please follow your doctor's instructions.)  4. Incisions  - Your leg(s) will be bruised; there may be swelling, hard knots under the skin and possibly some     numbness. These will likely resolve over time. If you see  hair-like  strings coming out of your     incisions, do not pull them (this will only cause pain/discomfort). We will trim them when you come     back for your follow-up appointment.  5. Call Us If:   - You see any areas on your leg that are red and angry in appearance.   - You notice any drainage that is milky or cloudy in appearance or that has a foul odor.   - You run a temperature of 100.5 or greater.    Post-Op Day Three:  You will have a follow up appointment 2-4 days post-procedure. At this appointment, you will have an ultrasound and we will check your incisions.     ________________________________________________________________________________________    The Two Weeks Following Your Procedure  1.  Skin Care   - Do not use any lotions, creams or powders on your incisions for 14 days or until the incisions have                 healed.   - Do not soak in a bathtub, hot tub or go swimming for 14 days or until your incisions have healed.  2.  Medications   - You may use ibuprofen or acetaminophen (e.g., Tylenol) as needed for pain or discomfort.  3.  Activity   - Do not lift over 25 pounds. After about two weeks you may resume exercise such as aerobics,                 running, tennis or weightlifting. Use your common sense and ease back into your exercise routine                 slowly.   - You may feel a cord-like tightness along the inside of your leg. Gentle stretching can be helpful.  4. Compression Hose   - Your doctor may instruct you to wear compression for longer than seven days; please follow your                 doctor's instructions. As a comfort measure, you may choose to wear compression for longer than                 required.  5.  Travel   - Do not fly in an airplane for 14 days after your procedure. If you have a long car trip planned within                 two to three weeks following your procedure, stop and walk for a few minutes every two hours.      Periodic ankle pumps during the ride may be helpful.    Six Week Appointment  - At your six-week appointment, you will see your surgeon for an exam and evaluation. This office visit     will be scheduled when you return for post-op day three return appointment.       Return to Work  1.  If you work outside the home, you may return to work in a few days depending on the extent of your        procedure, how you tolerate it, and the type of work you perform.  2.  Paperwork: If your employer requires paperwork or you would like a letter written to your employer, please        let us know. We will complete  disability type forms at no charge. Please allow five business days for forms        to be completed.

## 2024-04-30 NOTE — PROGRESS NOTES
Follow-up for varicose veins    Subjective:  Patient is a 49-year-old white female with a longstanding history of bilateral varicose veins.  She was last seen by me 2 years ago and was approved for a right GSV RF ablation and bilateral stab phlebectomies.  At that time she reported bilateral leg pain worse at the end the day with the right being worse than left.  Somehow she was never scheduled and now presents for follow-up.  She recently had a repeat ultrasound as her last one was 2 years ago.  She works as a [employer] and her symptoms do affect her ability to work.  Her symptoms are essentially unchanged over that time, even slightly worse.  She continues to be symptomatic despite compression.  She denies any leg trauma, DVT, superficial bites or nonhealing wounds.  She now presents for follow-up of her varicose veins.      Objective:  B/P: Data Unavailable, T: Data Unavailable, P: Data Unavailable, R: Data Unavailable  Lower Ext: Right lower extremity: Extensive medial thigh varicosities.  No edema.  Left lower extremity: Varicosities around knee.  No edema.        Ultrasound  Name:  Rosalie Sauceda                                           Patient ID: [id number]  Date: 2024                                                 : 1975  Sex: female                                                                 Examined by: FELIX Lobo RVT  Age:  49 year old                                                         Reading MD: ANTON Shcofield MD     INDICATION:  Bilateral varicose veins with pain     EXAM TYPE  BILATERAL LOWER EXTREMITY VENOUS DUPLEX FOR VENOUS INSUFFICIENCY  TECHNICAL SUMMARY     A duplex ultrasound study using color flow was performed, to evaluate the bilateral lower extremity veins for valvular incompetence with the patient in a steep reversed trendelenberg.      RIGHT:     The deep veins demonstrate phasic flow, compress and respond to augmentations.  There is no reflux or  DVT.       The GSV demonstrates phasic flow, compresses and responds to augmentations from the saphenofemoral junction to the mid thigh, and from the proximal calf to the ankle with no evidence of thrombus. The GSV is not visualized from distal thigh to the knee.  The great saphenous vein measures 6.8 mm at the saphenofemoral junction and 6.5 mm at the proximal thigh. The GSV is incompetent from Proximal Thigh to Mid Thigh, and from Mid Calf to Distal Calf, with the greatest reflux time of 53711 milliseconds.  The GSV gives rise to a varicose branch measuring 4.1 mm off the  Mid Thigh that courses Medial with a reflux time of 50661 milliseconds.  The GSV leaves the sheath and becomes superficial at the mid thigh.     The AASV is not visualized.      The Giacomini vein is competent ( 1.6 mm) communicating with the small saphenous vein at the knee level.      The SSV demonstrates phasic flow, compresses and responds to augmentations from the popliteal space to the ankle.  No reflux or thrombus is seen. The saphenopopliteal junction is absent.      Perforators: There is no evidence of incompetent  veins at any level.         LEFT:     The deep veins demonstrate phasic flow, compress and respond to augmentations.  There is no DVT.  The common femoral vein is incompetent and free of thrombus. The remaining deep veins are competent and free of thrombus.      The GSV demonstrates phasic flow, compresses and responds to augmentations from the saphenofemoral junction to the ankle with no evidence of thrombus. The great saphenous vein measures 8.8 mm at the saphenofemoral junction, 6.5 mm at the proximal thigh, and 5.0 mm at the knee. The GSV is incompetent from SFJ to Distal Thigh, with the greatest reflux time of 7440 milliseconds.  The GSV leaves the sheath at mid thigh and is superficial from the mid thigh to the mid calf.     The AASV is competent ( 2.7 mm) draining into the saphenofemoral junction.      The  Giacomini vein is competent ( 1.4 mm) communicating with the small saphenous vein at the knee level.      The SSV demonstrates phasic flow, compresses and responds to augmentations from the popliteal space to the ankle.  No reflux or thrombus is seen. The saphenopopliteal junction is absent.      Perforators: There is no evidence of incompetent  veins at any level.         FINAL SUMMARY:  Right lower extremity:  Deep veins  No deep vein thrombosis or deep vein valve incompetence     Superficial veins  1.  Absent distal thigh through knee level great saphenous vein  2.  Great saphenous vein is patent and incompetent from the proximal thigh through the mid thigh and from the mid calf to the distal calf.  3.  The thigh great saphenous veins the source of incompetent varicosities in the thigh and calf      veins  No incompetent perforators     Left lower extremity:  Deep veins  1.  No deep vein thrombosis  2.  Common femoral vein incompetence.  The remaining deep vein valves are competent     Superficial veins  1.  No superficial vein thrombosis  2.  Saphenofemoral junction through distal thigh great saphenous vein incompetence.  The mid thigh through mid calf great saphenous vein is superficial      veins  No incompetent perforators     Incompetence Criteria: Greater than 500 milliseconds reflux in the superficial and  veins and greater than 1000 milliseconds reflux in the deep veins.     JOLANTA Schofield MD, FACS              Assessment/plan:  This is a 49-year-old with bilateral symptomatic varicose veins despite compression therapy.  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  She was scheduled to be treated but for some reason was not scheduled 2 years ago.  She now returns with worsening symptoms.  On her ultrasound her left leg has progressed since 2 years ago.  Based on her ultrasound she is a candidate for bilateral GSV RF ablations and stab phlebectomies.    The procedure, risks, benefits, and alternatives were discussed and the patient agrees to proceed.  She will be scheduled once insurance approval is obtained.    Reagan Martinez MD, FACS

## 2024-04-30 NOTE — LETTER
2024         RE: Rosalie Sauceda  [address] [facility] [address]        Dear Colleague,    Thank you for referring your patient, Rosalie Sauceda, to the [facility] [place]. Please see a copy of my visit note below.    Follow-up for varicose veins    Subjective:  Patient is a 49-year-old white female with a longstanding history of bilateral varicose veins.  She was last seen by me 2 years ago and was approved for a right GSV RF ablation and bilateral stab phlebectomies.  At that time she reported bilateral leg pain worse at the end the day with the right being worse than left.  Somehow she was never scheduled and now presents for follow-up.  She recently had a repeat ultrasound as her last one was 2 years ago.  She works as a [employer] and her symptoms do affect her ability to work.  Her symptoms are essentially unchanged over that time, even slightly worse.  She continues to be symptomatic despite compression.  She denies any leg trauma, DVT, superficial bites or nonhealing wounds.  She now presents for follow-up of her varicose veins.      Objective:  B/P: Data Unavailable, T: Data Unavailable, P: Data Unavailable, R: Data Unavailable  Lower Ext: Right lower extremity: Extensive medial thigh varicosities.  No edema.  Left lower extremity: Varicosities around knee.  No edema.        Ultrasound  Name:  Rosalie Sauceda                                           Patient ID: [id number]  Date: 2024                                                 : 1975  Sex: female                                                                 Examined by: FELIX Lobo RVT  Age:  49 year old                                                         Reading MD: ANTON Schofield MD     INDICATION:  Bilateral varicose veins with pain     EXAM TYPE  BILATERAL LOWER EXTREMITY VENOUS DUPLEX FOR VENOUS INSUFFICIENCY  TECHNICAL SUMMARY     A duplex ultrasound study using color flow  was performed, to evaluate the bilateral lower extremity veins for valvular incompetence with the patient in a steep reversed trendelenberg.      RIGHT:     The deep veins demonstrate phasic flow, compress and respond to augmentations.  There is no reflux or DVT.       The GSV demonstrates phasic flow, compresses and responds to augmentations from the saphenofemoral junction to the mid thigh, and from the proximal calf to the ankle with no evidence of thrombus. The GSV is not visualized from distal thigh to the knee.  The great saphenous vein measures 6.8 mm at the saphenofemoral junction and 6.5 mm at the proximal thigh. The GSV is incompetent from Proximal Thigh to Mid Thigh, and from Mid Calf to Distal Calf, with the greatest reflux time of 67951 milliseconds.  The GSV gives rise to a varicose branch measuring 4.1 mm off the  Mid Thigh that courses Medial with a reflux time of 42277 milliseconds.  The GSV leaves the sheath and becomes superficial at the mid thigh.     The AASV is not visualized.      The Giacomini vein is competent ( 1.6 mm) communicating with the small saphenous vein at the knee level.      The SSV demonstrates phasic flow, compresses and responds to augmentations from the popliteal space to the ankle.  No reflux or thrombus is seen. The saphenopopliteal junction is absent.      Perforators: There is no evidence of incompetent  veins at any level.         LEFT:     The deep veins demonstrate phasic flow, compress and respond to augmentations.  There is no DVT.  The common femoral vein is incompetent and free of thrombus. The remaining deep veins are competent and free of thrombus.      The GSV demonstrates phasic flow, compresses and responds to augmentations from the saphenofemoral junction to the ankle with no evidence of thrombus. The great saphenous vein measures 8.8 mm at the saphenofemoral junction, 6.5 mm at the proximal thigh, and 5.0 mm at the knee. The GSV is incompetent from  SFJ to Distal Thigh, with the greatest reflux time of 7440 milliseconds.  The GSV leaves the sheath at mid thigh and is superficial from the mid thigh to the mid calf.     The AASV is competent ( 2.7 mm) draining into the saphenofemoral junction.      The Giacomini vein is competent ( 1.4 mm) communicating with the small saphenous vein at the knee level.      The SSV demonstrates phasic flow, compresses and responds to augmentations from the popliteal space to the ankle.  No reflux or thrombus is seen. The saphenopopliteal junction is absent.      Perforators: There is no evidence of incompetent  veins at any level.         FINAL SUMMARY:  Right lower extremity:  Deep veins  No deep vein thrombosis or deep vein valve incompetence     Superficial veins  1.  Absent distal thigh through knee level great saphenous vein  2.  Great saphenous vein is patent and incompetent from the proximal thigh through the mid thigh and from the mid calf to the distal calf.  3.  The thigh great saphenous veins the source of incompetent varicosities in the thigh and calf      veins  No incompetent perforators     Left lower extremity:  Deep veins  1.  No deep vein thrombosis  2.  Common femoral vein incompetence.  The remaining deep vein valves are competent     Superficial veins  1.  No superficial vein thrombosis  2.  Saphenofemoral junction through distal thigh great saphenous vein incompetence.  The mid thigh through mid calf great saphenous vein is superficial      veins  No incompetent perforators     Incompetence Criteria: Greater than 500 milliseconds reflux in the superficial and  veins and greater than 1000 milliseconds reflux in the deep veins.     JOLANTA Schofield MD, FACS              Assessment/plan:  This is a 49-year-old with bilateral symptomatic varicose veins despite compression therapy.  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  She was scheduled to be treated but  for some reason was not scheduled 2 years ago.  She now returns with worsening symptoms.  On her ultrasound her left leg has progressed since 2 years ago.  Based on her ultrasound she is a candidate for bilateral GSV RF ablations and stab phlebectomies.   The procedure, risks, benefits, and alternatives were discussed and the patient agrees to proceed.  She will be scheduled once insurance approval is obtained.    Reagan Martinez MD, FACS          April 30, 2024    Vein Procedure Recommendation    Spoke with patient in clinic.    Dr. Martinez has recommended patient to have the following vein procedure(s):     1. Right leg VNUS closure GSV and 20-30 Phlebectomies (medically necessary)    2. Left leg VNUS closure GSV and 10-20 Phlebectomies (medically necessary)      Patient Pre-op Questions:  Preferred Pharmacy: [facility]   Anticoagulant/ASA: No  Artificial Joint or Heart Valve:  No  Open ulcer:  No  Sedation nausea: No      Patient is recommended to wear Thigh High compression hose following her procedure. Discussed compression hose. Explained to patient if insurance doesn't cover the compression hose there are a couple different options to getting them and to call the clinic to let us know if they need help.    Handed patient written procedure instructions to review on her own (see After Visit Summary).    Next steps:    Insurance Submission  Informed patient this process could take up to 14 business days, but once approved, the patient will be contacted by our surgery scheduler to schedule the above procedure. Gave patient our surgery scheduler's information.    Patient is in agreement with all of the above and has no further questions at this time.    Naty Rose RN  [facility]  Vein Clinic        Again, thank you for allowing me to participate in the care of your patient.        Sincerely,        Reagan Martinez MD

## 2024-04-30 NOTE — PROGRESS NOTES
April 30, 2024    Vein Procedure Recommendation    Spoke with patient in clinic.    Dr. Martinez has recommended patient to have the following vein procedure(s):     1. Right leg VNUS closure GSV and 20-30 Phlebectomies (medically necessary)    2. Left leg VNUS closure GSV and 10-20 Phlebectomies (medically necessary)      Patient Pre-op Questions:  Preferred Pharmacy: [facility]   Anticoagulant/ASA: No  Artificial Joint or Heart Valve:  No  Open ulcer:  No  Sedation nausea: No      Patient is recommended to wear Thigh High compression hose following her procedure. Discussed compression hose. Explained to patient if insurance doesn't cover the compression hose there are a couple different options to getting them and to call the clinic to let us know if they need help.    Handed patient written procedure instructions to review on her own (see After Visit Summary).    Next steps:    Insurance Submission  Informed patient this process could take up to 14 business days, but once approved, the patient will be contacted by our surgery scheduler to schedule the above procedure. Gave patient our surgery scheduler's information.    Patient is in agreement with all of the above and has no further questions at this time.    Naty Rose RN  [facility]  Vein Clinic

## 2024-05-08 ENCOUNTER — OFFICE VISIT (OUTPATIENT)
Dept: PODIATRY | Facility: CLINIC | Age: 49
End: 2024-05-08
Attending: STUDENT IN AN ORGANIZED HEALTH CARE EDUCATION/TRAINING PROGRAM
Payer: COMMERCIAL

## 2024-05-08 VITALS
SYSTOLIC BLOOD PRESSURE: 129 MMHG | WEIGHT: 198 LBS | HEART RATE: 67 BPM | DIASTOLIC BLOOD PRESSURE: 86 MMHG | HEIGHT: 67 IN | BODY MASS INDEX: 31.08 KG/M2

## 2024-05-08 DIAGNOSIS — M72.2 PLANTAR FASCIITIS, RIGHT: Primary | ICD-10-CM

## 2024-05-08 DIAGNOSIS — M72.2 PLANTAR FASCIITIS, LEFT: ICD-10-CM

## 2024-05-08 PROCEDURE — 99203 OFFICE O/P NEW LOW 30 MIN: CPT | Performed by: PODIATRIST

## 2024-05-08 ASSESSMENT — PAIN SCALES - GENERAL: PAINLEVEL: MODERATE PAIN (4)

## 2024-05-08 NOTE — PROGRESS NOTES
"PATIENT HISTORY:  Rosalie Sauceda is a 49 year old female who presents to clinic in consultation at the request of  Dee Pulido M.D. with a chief complaint of bilateral foot pain.  The patient is seen by themselves.  The patient relates the pain is primarily located around the ball of the foot into the bottom arch.  Reports insidious onset without acute precipitating event. that has been going on for 2 year(s).  The patient has previously tried massage, different shoes, and physical therapy with little relief.  Prior history of similar pain/issues ~ 2 years ago..  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  Any previous notes and studies that pertain to the patient's condition were reviewed.    Pertinent medical, surgical and family history was reviewed in the Epic chart.    Past Medical History:   Past Medical History:   Diagnosis Date    Lumbago     back injury in the past       Medications:   Current Outpatient Medications:     levonorgestrel (MIRENA) 52 MG (20 mcg/day) IUD, by Intrauterine route once, Disp: , Rfl:     UNABLE TO FIND, MEDICATION NAME: Ozempic one tab daily, patient reports taking for the past 2 weeks, Disp: , Rfl:     acetaminophen (TYLENOL) 500 MG tablet, Take 500-1,000 mg by mouth every 6 hours as needed for mild pain (Patient not taking: Reported on 5/8/2024), Disp: , Rfl:     cyclobenzaprine (FLEXERIL) 5 MG tablet, Take 2 tablets (10 mg) by mouth 2 times daily as needed for muscle spasms (Patient not taking: Reported on 5/8/2024), Disp: 25 tablet, Rfl: 0    valACYclovir (VALTREX) 1000 mg tablet, TAKE 1 TABLET BY MOUTH TWICE DAILY FOR 3 DAYS (Patient not taking: Reported on 4/5/2024), Disp: , Rfl:      Allergies:    Allergies   Allergen Reactions    Amoxicillin     Pcn [Penicillins]        Vitals: /86   Pulse 67   Ht 1.71 m (5' 7.32\")   Wt 89.8 kg (198 lb)   BMI 30.72 kg/m    BMI= Body mass index is 30.72 kg/m .    LOWER EXTREMITY PHYSICAL EXAM    Dermatologic: Skin is intact " to right and left lower extremity without significant lesions, rash or abrasion.        Vascular: DP & PT pulses are intact & regular on the right and left.   CFT and skin temperature is normal to the right and left lower extremity.     Neurologic: Lower extremity sensation is intact to light touch.  No evidence of weakness in the right and left lower extremity.        Musculoskeletal: Patient is ambulatory without assistive device or brace.  No gross ankle deformity noted.  No foot or ankle joint effusion is noted.  Noted pain on palpation on the plantar aspect of the right and left heel near the insertion point of the plantar fascia.  No surrounding erythema or edema noted.  Noted tight gastroc complex on the right and left.           ASSESSMENT / PLAN:     ICD-10-CM    1. Plantar fasciitis, right  M72.2 Orthotics, Mastectomy and Custom Compression Orders      2. Plantar fasciitis, left  M72.2 Orthotics, Mastectomy and Custom Compression Orders          I have explained to Rosalie about the conditions.  We discussed the underlying contributing factors to the condition as well as treatment options along with expected length of recovery.  At this time, the patient was educated on the importance of offloading supportive shoes and other devices.  I demonstrated to the patient calf stretches to perform every hour daily until symptoms resolve.  After symptoms resolve, the patient was advised to perform the stretches 3 times daily to prevent future recurrence.  The patient was instructed to perform warm soaks with Epson salt after which to also apply over-the-counter Voltaren gel to deeply massage the injured tissue.  The patient was instructed to do this on a daily basis until symptoms resolve.  The patient was prescribed custom orthotics that will aid in offloading the tension forces to the soft tissues and prevent further inflammation.    The patient may return in four weeks for reevaluation to determine if any  further treatment will be needed.    Rosalie verbalized agreement with and understanding of the rational for the diagnosis and treatment plan.  All questions were answered to best of my ability and the patient's satisfaction. The patient was advised to contact the clinic with any questions that may arise after the clinic visit.      Disclaimer: This note consists of symbols derived from keyboarding, dictation and/or voice recognition software. As a result, there may be errors in the script that have gone undetected. Please consider this when interpreting information found in this chart.       SWAPNA Pillai D.P.M., F.SARAH.[email or web address].SARAH.S.

## 2024-05-08 NOTE — NURSING NOTE
"Chief Complaint   Patient presents with    Consult     Bilateral plantarfasciitis, worse in the right heel       Initial /86   Pulse 67   Ht 1.71 m (5' 7.32\")   Wt 89.8 kg (198 lb)   BMI 30.72 kg/m   Estimated body mass index is 30.72 kg/m  as calculated from the following:    Height as of this encounter: 1.71 m (5' 7.32\").    Weight as of this encounter: 89.8 kg (198 lb).  Medications and allergies reviewed.      Griselda EUGENE MA    "

## 2024-05-08 NOTE — LETTER
5/8/2024         RE: Rosalie Sauceda  [address] [facility] [address]        Dear Colleague,    Thank you for referring your patient, Rosalie Sauceda, to the [facility] [place]. Please see a copy of my visit note below.    PATIENT HISTORY:  Rosalie Sauceda is a 49 year old female who presents to clinic in consultation at the request of  Dee Pulido M.D. with a chief complaint of bilateral foot pain.  The patient is seen by themselves.  The patient relates the pain is primarily located around the ball of the foot into the bottom arch.  Reports insidious onset without acute precipitating event. that has been going on for 2 year(s).  The patient has previously tried massage, different shoes, and physical therapy with little relief.  Prior history of similar pain/issues ~ 2 years ago..  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  Any previous notes and studies that pertain to the patient's condition were reviewed.    Pertinent medical, surgical and family history was reviewed in the [facility] chart.    Past Medical History:   Past Medical History:   Diagnosis Date     Lumbago     back injury in the past       Medications:   Current Outpatient Medications:      levonorgestrel (MIRENA) 52 MG (20 mcg/day) IUD, by Intrauterine route once, Disp: , Rfl:      UNABLE TO FIND, MEDICATION NAME: Ozempic one tab daily, patient reports taking for the past 2 weeks, Disp: , Rfl:      acetaminophen (TYLENOL) 500 MG tablet, Take 500-1,000 mg by mouth every 6 hours as needed for mild pain (Patient not taking: Reported on 5/8/2024), Disp: , Rfl:      cyclobenzaprine (FLEXERIL) 5 MG tablet, Take 2 tablets (10 mg) by mouth 2 times daily as needed for muscle spasms (Patient not taking: Reported on 5/8/2024), Disp: 25 tablet, Rfl: 0     valACYclovir (VALTREX) 1000 mg tablet, TAKE 1 TABLET BY MOUTH TWICE DAILY FOR 3 DAYS (Patient not taking: Reported on 4/5/2024), Disp: , Rfl:      Allergies:   "  Allergies   Allergen Reactions     Amoxicillin      Pcn [Penicillins]        Vitals: /86   Pulse 67   Ht 1.71 m (5' 7.32\")   Wt 89.8 kg (198 lb)   BMI 30.72 kg/m    BMI= Body mass index is 30.72 kg/m .    LOWER EXTREMITY PHYSICAL EXAM    Dermatologic: Skin is intact to right and left lower extremity without significant lesions, rash or abrasion.        Vascular: DP & PT pulses are intact & regular on the right and left.   CFT and skin temperature is normal to the right and left lower extremity.     Neurologic: Lower extremity sensation is intact to light touch.  No evidence of weakness in the right and left lower extremity.        Musculoskeletal: Patient is ambulatory without assistive device or brace.  No gross ankle deformity noted.  No foot or ankle joint effusion is noted.  Noted pain on palpation on the plantar aspect of the right and left heel near the insertion point of the plantar fascia.  No surrounding erythema or edema noted.  Noted tight gastroc complex on the right and left.           ASSESSMENT / PLAN:     ICD-10-CM    1. Plantar fasciitis, right  M72.2 Orthotics, Mastectomy and Custom Compression Orders      2. Plantar fasciitis, left  M72.2 Orthotics, Mastectomy and Custom Compression Orders          I have explained to Rosalie about the conditions.  We discussed the underlying contributing factors to the condition as well as treatment options along with expected length of recovery.  At this time, the patient was educated on the importance of offloading supportive shoes and other devices.  I demonstrated to the patient calf stretches to perform every hour daily until symptoms resolve.  After symptoms resolve, the patient was advised to perform the stretches 3 times daily to prevent future recurrence.  The patient was instructed to perform warm soaks with Epson salt after which to also apply over-the-counter Voltaren gel to deeply massage the injured tissue.  The patient was instructed to " do this on a daily basis until symptoms resolve.  The patient was prescribed custom orthotics that will aid in offloading the tension forces to the soft tissues and prevent further inflammation.    The patient may return in four weeks for reevaluation to determine if any further treatment will be needed.    Rosalie verbalized agreement with and understanding of the rational for the diagnosis and treatment plan.  All questions were answered to best of my ability and the patient's satisfaction. The patient was advised to contact the clinic with any questions that may arise after the clinic visit.      Disclaimer: This note consists of symbols derived from keyboarding, dictation and/or voice recognition software. As a result, there may be errors in the script that have gone undetected. Please consider this when interpreting information found in this chart.       VALORIE Day.P.OSCAR., F.A.C.F.A.S.      Again, thank you for allowing me to participate in the care of your patient.        Sincerely,        Thomas Pillai DPM

## 2024-05-08 NOTE — PATIENT INSTRUCTIONS

## 2024-05-09 ENCOUNTER — THERAPY VISIT (OUTPATIENT)
Dept: PHYSICAL THERAPY | Facility: CLINIC | Age: 49
End: 2024-05-09
Attending: NURSE PRACTITIONER
Payer: COMMERCIAL

## 2024-05-09 DIAGNOSIS — M54.42 ACUTE BILATERAL LOW BACK PAIN WITH BILATERAL SCIATICA: ICD-10-CM

## 2024-05-09 DIAGNOSIS — M54.41 ACUTE BILATERAL LOW BACK PAIN WITH BILATERAL SCIATICA: ICD-10-CM

## 2024-05-09 PROCEDURE — 97161 PT EVAL LOW COMPLEX 20 MIN: CPT | Mod: GP | Performed by: PHYSICAL MEDICINE & REHABILITATION

## 2024-05-09 PROCEDURE — 97110 THERAPEUTIC EXERCISES: CPT | Mod: GP | Performed by: PHYSICAL MEDICINE & REHABILITATION

## 2024-05-09 NOTE — PROGRESS NOTES
PHYSICAL THERAPY EVALUATION  Type of Visit: Evaluation    See electronic medical record for Abuse and Falls Screening details.    Subjective       Presenting condition or subjective complaint:  Patient notes that she injured her back mid April and was getting into her bathtub and her low back spasmed to the point where she was unable to move. This occurrence has happens a few times a year. Has been off muscle relaxers for about 2 weeks. Cannot relate if pain comes from one side versus other. Denies numbness/tingling or radiating leg pain.  Date of onset: 04/12/24    Relevant medical history:   pain at rest  Dates & types of surgery:  none relevant    Prior diagnostic imaging/testing results:       XR IMPRESSION: Left convex curvature centered at the thoracolumbar junction. No loss of vertebral body height. Multilevel disc height loss and osteophyte formation.    Prior therapy history for the same diagnosis, illness or injury:    yes, chiro    Employment:    stylist  Hobbies/Interests:  knitting    Patient goals for therapy:  to reduce occurrence of spasms in low back        Objective   LUMBAR SPINE EVALUATION  PAIN: Pain Level at Rest: 2/10  Pain Level with Use: 6/10  Pain Location: lumbar spine and hip  Pain Quality: Aching, Dull, and Sharp  Pain Frequency: intermittent  Pain is Worst: daytime or nighttime  Pain is Exacerbated By: twisting, lifting/carrying  Pain is Relieved By: heat, rest, stretch, and TENS  INTEGUMENTARY (edema, incisions): WFL  POSTURE: Standing Posture: Rounded shoulders  GAIT:   Weightbearing Status: WBAT  Assistive Device(s): None  Gait Deviations: WFL  BALANCE/PROPRIOCEPTION: WFL  WEIGHTBEARING ALIGNMENT: Hip WB Alignment:Greater trochanter high R  Lumbar/Pelvic WB Alignment:Anterior pelvic tilt, Lordosis increased  ROM:   (Degrees) Left AROM Left PROM  Right AROM Right PROM   Hip Flexion WFL  WFL    Hip Extension       Hip Abduction       Hip Adduction       Hip Internal Rotation 46*  44*     Hip External Rotation 29*  21*    Knee Flexion WFL  WFL    Knee Extension WFL  WFL    Lumbar Side glide 20% limited 40% limited   Lumbar Flexion WFL   Lumbar Extension 20% limited     PELVIC/SI SCREEN: WFL  STRENGTH:  R hip abduction 4/5; R hip flexion 4+/5; R hip extension 4/5; fair (+) core  MYOTOMES: WNL  NEURAL TENSION:  mildly positive Slump RLE  FLEXIBILITY: Decreased hip IR R, Decreased hip ER R, Decreased piriformis R, Decreased hamstrings R  LUMBAR/HIP Special Tests:  positive RLE FADIR; negative SLR test    PELVIS/SI SPECIAL TESTS: WFL  FUNCTIONAL TESTS: Double Leg Squat: Anterior knee translation, Knee valgus, Hip internal rotation, and Improper use of glutes/hips  PALPATION:  tenderness at L4-5 lumbar paraspinal muscles  SPINAL SEGMENTAL CONCLUSIONS:  FRS L L3-4    Assessment & Plan   CLINICAL IMPRESSIONS  Medical Diagnosis: Acute bilateral low back pain with bilateral sciatica (M54.42, M54.41)  - Primary    Treatment Diagnosis: low back pain   Impression/Assessment: Patient is a 49 year old female with low back and right hip complaints.  The following significant findings have been identified: Pain, Decreased ROM/flexibility, Decreased joint mobility, Decreased strength, and Impaired muscle performance. These impairments interfere with their ability to perform recreational activities and household chores as compared to previous level of function.     Clinical Decision Making (Complexity):  Clinical Presentation: Stable/Uncomplicated  Clinical Presentation Rationale: based on medical and personal factors listed in PT evaluation  Clinical Decision Making (Complexity): Low complexity    PLAN OF CARE  Treatment Interventions:  Interventions: Manual Therapy, Neuromuscular Re-education, Therapeutic Activity, Therapeutic Exercise    Long Term Goals     PT Goal 1  Goal Identifier: STG  Goal Description: Patient to report after full day <3/10 LBP.  Rationale: to maximize safety and independence with  performance of ADLs and functional tasks  Target Date: 05/30/24  PT Goal 2  Goal Identifier: LTG  Goal Description: Patient to report she can perform all work duties with <2/10 pain.  Rationale: to maximize safety and independence with performance of ADLs and functional tasks  Target Date: 07/18/24  PT Goal 3  Goal Identifier: LTG  Goal Description: Patient to be IND and consistent with HEP to aid functional recovery and reduce future occurence of LBP.  Rationale: to maximize safety and independence with performance of ADLs and functional tasks  Target Date: 07/18/24      Frequency of Treatment: 1x/week  Duration of Treatment: 10 weeks      Education Assessment:   Learner/Method: Patient;Listening;Reading;Demonstration;Pictures/Video    Risks and benefits of evaluation/treatment have been explained.   Patient/Family/caregiver agrees with Plan of Care.     Evaluation Time:     PT Eval, Low Complexity Minutes (99492): 15     Signing Clinician: Fabio Schrader, PT

## 2024-05-23 NOTE — PROGRESS NOTES
Approved per [facility] GSV Endovenous Radiofrequency or Laser ablation and right leg phlebs: 1 visits during the following time frame: 5/6/24 to 8/31/24 Ref# [id number]    Denied Left leg phlebs due to the veins need to be 3mm in size  Per Dr. Martinez change to cosmetic 0.5 unit phlebs($534)    Naty Roberson, Surgery Scheduler  [facility]

## 2024-05-30 DIAGNOSIS — I83.813 VARICOSE VEINS OF LEG WITH PAIN, BILATERAL: Primary | ICD-10-CM

## 2024-06-26 ENCOUNTER — ANESTHESIA EVENT (OUTPATIENT)
Dept: GASTROENTEROLOGY | Facility: CLINIC | Age: 49
End: 2024-06-26
Payer: COMMERCIAL

## 2024-06-26 RX ORDER — DEXAMETHASONE SODIUM PHOSPHATE 4 MG/ML
4 INJECTION, SOLUTION INTRA-ARTICULAR; INTRALESIONAL; INTRAMUSCULAR; INTRAVENOUS; SOFT TISSUE
Status: CANCELLED | OUTPATIENT
Start: 2024-06-26

## 2024-06-26 RX ORDER — ONDANSETRON 4 MG/1
4 TABLET, ORALLY DISINTEGRATING ORAL EVERY 30 MIN PRN
Status: CANCELLED | OUTPATIENT
Start: 2024-06-26

## 2024-06-26 RX ORDER — ONDANSETRON 2 MG/ML
4 INJECTION INTRAMUSCULAR; INTRAVENOUS EVERY 30 MIN PRN
Status: CANCELLED | OUTPATIENT
Start: 2024-06-26

## 2024-06-26 ASSESSMENT — LIFESTYLE VARIABLES: TOBACCO_USE: 1

## 2024-06-26 NOTE — ANESTHESIA PREPROCEDURE EVALUATION
Anesthesia Pre-Procedure Evaluation    Patient: Rosalie Sauceda   MRN: [id number] : 1975        Procedure : Procedure(s):  Colonoscopy          Past Medical History:   Diagnosis Date     Lumbago     back injury in the past      Past Surgical History:   Procedure Laterality Date     C/SECTION, LOW TRANSVERSE  2006     LIGATN/STRIP LONG & SHORT SAPHEN  2007      Allergies   Allergen Reactions     Amoxicillin      Pcn [Penicillins]       Social History     Tobacco Use     Smoking status: Former     Current packs/day: 1.00     Average packs/day: 1 pack/day for 29.0 years (29.0 ttl pk-yrs)     Types: Cigarettes     Smokeless tobacco: Never     Tobacco comments:     Current vaping   Substance Use Topics     Alcohol use: Yes     Comment: 10 drinks per month- or less.      Wt Readings from Last 1 Encounters:   24 89.8 kg (198 lb)        Anesthesia Evaluation   Pt has had prior anesthetic. Type: Regional.        ROS/MED HX  ENT/Pulmonary:     (+)                tobacco use, Past use,                       Neurologic:  - neg neurologic ROS     Cardiovascular:  - neg cardiovascular ROS     METS/Exercise Tolerance: >4 METS    Hematologic:  - neg hematologic  ROS     Musculoskeletal: Comment: TMJ      GI/Hepatic:     (+)        bowel prep,            Renal/Genitourinary:  - neg Renal ROS     Endo:  - neg endo ROS   (+)               Obesity,       Psychiatric/Substance Use:  - neg psychiatric ROS     Infectious Disease:  - neg infectious disease ROS     Malignancy:  - neg malignancy ROS     Other:  - neg other ROS    (+)  , H/O Chronic Pain (low back),         Physical Exam    Airway  airway exam normal      Mallampati: II   TM distance: > 3 FB   Neck ROM: full   Mouth opening: > 3 cm    Respiratory Devices and Support         Dental       (+) Minor Abnormalities - some fillings, tiny chips      Cardiovascular   cardiovascular exam normal          Pulmonary   pulmonary exam normal        breath sounds clear to  "auscultation       OUTSIDE LABS:  CBC:   Lab Results   Component Value Date    WBC 10.3 06/10/2006    WBC 10.9 06/07/2006    HGB 14.1 07/18/2007    HGB 8.3 (L) 06/10/2006    HCT 26.2 (L) 06/10/2006    HCT 32.2 (L) 06/07/2006     06/10/2006     06/07/2006     BMP:   Lab Results   Component Value Date     02/13/2024    POTASSIUM 3.9 02/13/2024    CHLORIDE 100 02/13/2024    CO2 26 02/13/2024    BUN 15.3 02/13/2024    CR 0.73 02/13/2024    GLC 93 02/13/2024     (H) 07/20/2022     COAGS: No results found for: \"PTT\", \"INR\", \"FIBR\"  POC:   Lab Results   Component Value Date    HCG Negative 12/23/2016     HEPATIC: No results found for: \"ALBUMIN\", \"PROTTOTAL\", \"ALT\", \"AST\", \"GGT\", \"ALKPHOS\", \"BILITOTAL\", \"BILIDIRECT\", \"EDITH\"  OTHER:   Lab Results   Component Value Date    A1C 5.6 08/02/2022    SCOOBY 10.0 02/13/2024    TSH 1.29 07/20/2022       Anesthesia Plan    ASA Status:  2    NPO Status:  NPO Appropriate    Anesthesia Type: General.     - Airway: Native airway   Induction: Intravenous.   Maintenance: TIVA.        Consents    Anesthesia Plan(s) and associated risks, benefits, and realistic alternatives discussed. Questions answered and patient/representative(s) expressed understanding.     - Discussed: Risks, Benefits and Alternatives for BOTH SEDATION and the PROCEDURE were discussed     - Discussed with:  Patient            Postoperative Care            Comments:             Shane Trejo, APRN CRNA    I have reviewed the pertinent notes and labs in the chart from the past 30 days and (re)examined the patient.  Any updates or changes from those notes are reflected in this note.                  "

## 2024-07-01 ENCOUNTER — HOSPITAL ENCOUNTER (OUTPATIENT)
Facility: CLINIC | Age: 49
Discharge: HOME | End: 2024-07-01
Attending: SURGERY | Admitting: SURGERY
Payer: COMMERCIAL

## 2024-07-01 ENCOUNTER — ANESTHESIA (OUTPATIENT)
Dept: GASTROENTEROLOGY | Facility: CLINIC | Age: 49
End: 2024-07-01
Payer: COMMERCIAL

## 2024-07-01 VITALS
RESPIRATION RATE: 16 BRPM | OXYGEN SATURATION: 98 % | TEMPERATURE: 98 F | DIASTOLIC BLOOD PRESSURE: 65 MMHG | SYSTOLIC BLOOD PRESSURE: 98 MMHG | HEART RATE: 71 BPM

## 2024-07-01 LAB — COLONOSCOPY: NORMAL

## 2024-07-01 PROCEDURE — 258N000003 HC RX IP 258 OP 636: Performed by: PHYSICIAN ASSISTANT

## 2024-07-01 PROCEDURE — 88305 TISSUE EXAM BY PATHOLOGIST: CPT | Mod: 26 | Performed by: PATHOLOGY

## 2024-07-01 PROCEDURE — 45385 COLONOSCOPY W/LESION REMOVAL: CPT | Mod: PT | Performed by: SURGERY

## 2024-07-01 PROCEDURE — 370N000017 HC ANESTHESIA TECHNICAL FEE, PER MIN: Performed by: SURGERY

## 2024-07-01 PROCEDURE — 250N000011 HC RX IP 250 OP 636: Performed by: NURSE ANESTHETIST, CERTIFIED REGISTERED

## 2024-07-01 PROCEDURE — 88305 TISSUE EXAM BY PATHOLOGIST: CPT | Mod: TC | Performed by: SURGERY

## 2024-07-01 RX ORDER — SODIUM CHLORIDE, SODIUM LACTATE, POTASSIUM CHLORIDE, CALCIUM CHLORIDE 600; 310; 30; 20 MG/100ML; MG/100ML; MG/100ML; MG/100ML
INJECTION, SOLUTION INTRAVENOUS CONTINUOUS
Status: DISCONTINUED | OUTPATIENT
Start: 2024-07-01 | End: 2024-07-01 | Stop reason: HOSPADM

## 2024-07-01 RX ORDER — PROPOFOL 10 MG/ML
INJECTION, EMULSION INTRAVENOUS CONTINUOUS PRN
Status: DISCONTINUED | OUTPATIENT
Start: 2024-07-01 | End: 2024-07-01

## 2024-07-01 RX ORDER — LIDOCAINE 40 MG/G
CREAM TOPICAL
Status: DISCONTINUED | OUTPATIENT
Start: 2024-07-01 | End: 2024-07-01 | Stop reason: HOSPADM

## 2024-07-01 RX ADMIN — PROPOFOL 200 MCG/KG/MIN: 10 INJECTION, EMULSION INTRAVENOUS at 08:04

## 2024-07-01 RX ADMIN — SODIUM CHLORIDE, POTASSIUM CHLORIDE, SODIUM LACTATE AND CALCIUM CHLORIDE: 600; 310; 30; 20 INJECTION, SOLUTION INTRAVENOUS at 07:40

## 2024-07-01 ASSESSMENT — ACTIVITIES OF DAILY LIVING (ADL)
ADLS_ACUITY_SCORE: 35

## 2024-07-01 NOTE — ANESTHESIA CARE TRANSFER NOTE
Patient: Rosalie Sauceda    Procedure: Procedure(s):  COLONOSCOPY, FLEXIBLE, WITH LESION REMOVAL USING SNARE       Diagnosis: Screen for colon cancer [Z12.11]  Diagnosis Additional Information: No value filed.    Anesthesia Type:   General     Note:    Oropharynx: spontaneously breathing  Level of Consciousness: drowsy  Oxygen Supplementation: room air    Independent Airway: airway patency satisfactory and stable  Dentition: dentition unchanged  Vital Signs Stable: post-procedure vital signs reviewed and stable  Report to RN Given: handoff report given  Patient transferred to: Phase II    Handoff Report: Identifed the Patient, Identified the Reponsible Provider, Reviewed the pertinent medical history, Discussed the surgical course, Reviewed Intra-OP anesthesia mangement and issues during anesthesia, Set expectations for post-procedure period and Allowed opportunity for questions and acknowledgement of understanding  Vitals:  Vitals Value Taken Time   BP 98/65 07/01/24 0823   Temp     Pulse 71 07/01/24 0823   Resp     SpO2 98 % 07/01/24 0825   Vitals shown include unfiled device data.    Electronically Signed By: ANDREW Craig CRNA  July 1, 2024  8:26 AM

## 2024-07-01 NOTE — LETTER
Rosalie MOORE [facility]  [address] [place] [id number]    July 8, 2024    Dear Rosalie,  This letter is written to inform you of the results of your recent colonoscopy.  Your examination showed polyp(s) in your rectum. All polyps were removed in their entirety and sent for review by a pathologist. As you will see on the pathology report below, the tissue(s) were hyperplastic polyps. Your examination was otherwise without abnormality.    Final Diagnosis   Large intestine, rectum, polyp, biopsy/polypectomy:  - Hyperplastic polyp negative for dysplasia and malignancy.        Hyperplastic polyps are entirely benign (non-cancerous) and rarely associated with the development of additional polyps or colorectal cancer.    Given these findings, I recommend that you undergo a repeat colonoscopy in ten years for screening. We will enter you into a recall system so you receive a reminder closer to the time that you are due for repeat examination.     Please remember that this recommendation is made with the understanding that you are not experiencing persistent changes in bowel function, bleeding per rectum, and/or significant abdominal pain. If you experience these symptoms, please contact your primary care provider for a further evaluation.     If you have any questions or concerns about the results of your colonoscopy or the appropriate follow-up, please contact the general surgery access center at [phone].    Sincerely,        [facility]  ___

## 2024-07-01 NOTE — ANESTHESIA POSTPROCEDURE EVALUATION
Patient: Rosalie Sauceda    Procedure: Procedure(s):  COLONOSCOPY, FLEXIBLE, WITH LESION REMOVAL USING SNARE       Anesthesia Type:  General    Note:  Disposition: Outpatient   Postop Pain Control: Uneventful            Sign Out: Well controlled pain   PONV: No   Neuro/Psych: Uneventful            Sign Out: Acceptable/Baseline neuro status   Airway/Respiratory: Uneventful            Sign Out: Acceptable/Baseline resp. status   CV/Hemodynamics: Uneventful            Sign Out: Acceptable CV status; No obvious hypovolemia; No obvious fluid overload   Other NRE: NONE   DID A NON-ROUTINE EVENT OCCUR? No           Last vitals:  Vitals Value Taken Time   /73 07/01/24 0844   Temp     Pulse 67 07/01/24 0844   Resp 16 07/01/24 0845   SpO2 100 % 07/01/24 0846   Vitals shown include unfiled device data.    Electronically Signed By: ANDREW Grant CRNA  July 1, 2024  10:17 AM

## 2024-07-01 NOTE — H&P
Former[facility]    Pre-Endoscopy History and Physical     Rosalie Sauceda MRN# [id number]   YOB: 1975 Age: 49 year old     Date of Procedure: 7/1/2024  Primary care provider: John Diaz [place]  Type of Endoscopy: Colonoscopy with possible biopsy, possible polypectomy  Reason for Procedure: screening  Type of Anesthesia Anticipated: MAC    HPI:    Rosalie is a 49 year old female who will be undergoing the above procedure.  1st colon; no famhx of colon ca; no blood thinner    A history and physical has been performed. The patient's medications and allergies have been reviewed. The risks and benefits of the procedure and the sedation options and risks were discussed with the patient.  All questions were answered and informed consent was obtained.      She denies a personal or family history of anesthesia complications or bleeding disorders.     Patient Active Problem List   Diagnosis    Varicose veins of lower extremities with complications    TMJ (temporomandibular joint syndrome)    Presence of intrauterine contraceptive device (IUD)    Encounter for tobacco use cessation counseling    Acute bilateral low back pain with bilateral sciatica        Past Medical History:   Diagnosis Date    Lumbago     back injury in the past        Past Surgical History:   Procedure Laterality Date    C/SECTION, LOW TRANSVERSE  6/2006    LIGATN/STRIP LONG & SHORT SAPHEN  7/2007       Social History     Tobacco Use    Smoking status: Former     Current packs/day: 1.00     Average packs/day: 1 pack/day for 29.0 years (29.0 ttl pk-yrs)     Types: Cigarettes    Smokeless tobacco: Never    Tobacco comments:     Current vaping   Substance Use Topics    Alcohol use: Yes     Comment: 10 drinks per month- or less.       Family History   Problem Relation Age of Onset    Cancer Father         skin ca    Diabetes Maternal Grandmother     Breast Cancer Maternal Grandmother         Dx in her 70's    Hypertension  "Maternal Grandmother     Cancer Maternal Grandfather         LUNG    Prostate Cancer Maternal Grandfather     Glaucoma No family hx of     Macular Degeneration No family hx of     Retinal detachment No family hx of        Prior to Admission medications    Medication Sig Start Date End Date Taking? Authorizing Provider   acetaminophen (TYLENOL) 500 MG tablet Take 500-1,000 mg by mouth every 6 hours as needed for mild pain   Yes Reported, Patient   cyclobenzaprine (FLEXERIL) 5 MG tablet Take 2 tablets (10 mg) by mouth 2 times daily as needed for muscle spasms 4/5/24  Yes Odessa Villeda APRN CNP   levonorgestrel (MIRENA) 52 MG (20 mcg/day) IUD by Intrauterine route once   Yes Reported, Patient   UNABLE TO FIND MEDICATION NAME: Ozempic one tab daily, patient reports taking for the past 2 weeks   Yes Reported, Patient   valACYclovir (VALTREX) 1000 mg tablet  4/2/22  Yes Reported, Patient       Allergies   Allergen Reactions    Amoxicillin     Pcn [Penicillins]         REVIEW OF SYSTEMS:   5 point ROS negative except as noted above in HPI, including Gen., Resp., CV, GI &  system review.    PHYSICAL EXAM:   /80   Pulse 68   Temp 98  F (36.7  C) (Oral)   Resp 15   SpO2 97%  Estimated body mass index is 30.72 kg/m  as calculated from the following:    Height as of 5/8/24: 1.71 m (5' 7.32\").    Weight as of 5/8/24: 89.8 kg (198 lb).   Constitutional: Awake, alert, no acute distress.  Eyes: No scleral icterus.  Conjunctiva are without injection.  ENMT: Mucous membranes moist, dentition and gums are intact.   Neck: Soft, supple, trachea midline.    Endocrine: n/a   Lymphatic: There is no cervical, submandibularadenopathy.  Respiratory: normal effortgs   Cardiovascular: S1, S2  Abdomen: Non-distended, non-tender,  No masses,  Musculoskeletal: No spinal or CVA tenderness. Full range of motion in the upper and lower extremities.    Skin: No skin rashes or lesions to inspection.  No petechia.    Neurologic: alerted " and oriented 3x  Psychiatric: The patient's affect is not blunted and mood is appropriate.  DIAGNOSTICS:    Not indicated    IMPRESSION   ASA Class 2 - Mild systemic disease    PLAN:   Plan for Colonoscopy with possible biopsy, possible polypectomy. We discussed the risks, benefits and alternatives and the patient wished to proceed.  Patient is cleared for the above procedure.    The above has been forwarded to the consulting provider.    [facility] hospital[facility], [facility]

## 2024-07-01 NOTE — ANESTHESIA POSTPROCEDURE EVALUATION
Patient: Rosalie Sauceda    Procedure: Procedure(s):  COLONOSCOPY, FLEXIBLE, WITH LESION REMOVAL USING SNARE       Anesthesia Type:  General    Note:  Disposition: Outpatient   Postop Pain Control:             Sign Out: Well controlled pain   PONV:    Neuro/Psych:             Sign Out: Acceptable/Baseline neuro status   Airway/Respiratory:             Sign Out: Acceptable/Baseline resp. status   CV/Hemodynamics:             Sign Out: Acceptable CV status   Other NRE: NONE   DID A NON-ROUTINE EVENT OCCUR? No       Last vitals:  Vitals:    07/01/24 0704   BP: 116/80   Pulse: 68   Resp: 15   Temp: 36.7  C (98  F)   SpO2: 97%       Electronically Signed By: ANDREW Craig CRNA  July 1, 2024  8:26 AM

## 2024-07-02 LAB
PATH REPORT.COMMENTS IMP SPEC: NORMAL
PATH REPORT.COMMENTS IMP SPEC: NORMAL
PATH REPORT.FINAL DX SPEC: NORMAL
PATH REPORT.GROSS SPEC: NORMAL
PATH REPORT.MICROSCOPIC SPEC OTHER STN: NORMAL
PATH REPORT.RELEVANT HX SPEC: NORMAL
PHOTO IMAGE: NORMAL

## 2024-08-20 ENCOUNTER — TELEPHONE (OUTPATIENT)
Dept: VASCULAR SURGERY | Facility: CLINIC | Age: 49
End: 2024-08-20
Payer: COMMERCIAL

## 2024-08-20 NOTE — TELEPHONE ENCOUNTER
8/20/2024    Vein Clinic Preoperative Nurse Call    Procedure: Right leg VNUS closure GSV (med nec), 20-30 Phlebs (med nec)   Date: 8/28/24  Surgeon: Dr. Martinez  Time: 1300  Check in time: 1200    Patient Pre-op Questions:  Preferred Pharmacy: [facility]   Anticoagulant/ASA: No  Artificial Joint or Heart Valve:  No  Open ulcer:  No  Sedation nausea: No    Called patient and left a detailed message. Informed patient: when to check in (1200) to sign consent, to bring their preop medications in their original bottle with them (3 mg ativan, 0.1mg clonidine). Patient will take the medications after signing the consent to the procedure. Instructed patient to wear loose-fitting comfortable clothing, and bring their compression hose. Ensured patient has a /someone that will be responsible for them the rest of the day. Once procedure is completed, we will keep patient in recovery for 30-45 mins, and call  with aftercare instructions. Informed patient, that if possible, they should sit in the backseat to elevate their leg on the ride home.    Pt needs Thigh High compression hose for procedure. Status of the hose: patient instructed to call and update if she has received her hose. .    Special instructions: 2 sets of sedation meds will be sent to patient pharmacy of choice. 2nd procedure scheduled for 9/25/24.      Patient understands if they have any of the following symptoms (fever, cough, shortness of breath, rash), they need to notify us immediately as they may need to cancel their procedure and reschedule for a later date.    Gave patient our call back number if any further questions or concerns.    Harish Cespedes RN  [facility]

## 2024-08-21 DIAGNOSIS — I83.813 VARICOSE VEINS OF LEG WITH PAIN, BILATERAL: Primary | ICD-10-CM

## 2024-08-25 RX ORDER — CLONIDINE HYDROCHLORIDE 0.1 MG/1
TABLET ORAL
Qty: 2 TABLET | Refills: 0 | Status: SHIPPED | OUTPATIENT
Start: 2024-08-25 | End: 2024-09-27

## 2024-08-25 RX ORDER — LORAZEPAM 1 MG/1
TABLET ORAL
Qty: 6 TABLET | Refills: 0 | Status: SHIPPED | OUTPATIENT
Start: 2024-08-25 | End: 2024-09-27

## 2024-08-28 ENCOUNTER — OFFICE VISIT (OUTPATIENT)
Dept: VASCULAR SURGERY | Facility: CLINIC | Age: 49
End: 2024-08-28
Payer: COMMERCIAL

## 2024-08-28 VITALS — HEART RATE: 61 BPM | SYSTOLIC BLOOD PRESSURE: 102 MMHG | DIASTOLIC BLOOD PRESSURE: 70 MMHG | OXYGEN SATURATION: 93 %

## 2024-08-28 DIAGNOSIS — I83.813 VARICOSE VEINS OF LEG WITH PAIN, BILATERAL: Primary | ICD-10-CM

## 2024-08-28 DIAGNOSIS — I83.811 VARICOSE VEINS OF LEG WITH PAIN, RIGHT: ICD-10-CM

## 2024-08-28 PROCEDURE — 37765 STAB PHLEB VEINS XTR 10-20: CPT | Mod: RT | Performed by: SPECIALIST

## 2024-08-28 PROCEDURE — 36475 ENDOVENOUS RF 1ST VEIN: CPT | Mod: RT | Performed by: SPECIALIST

## 2024-08-28 NOTE — LETTER
2024      Rosalie Sauceda  [address] [facility] [address]      Dear Colleague,    Thank you for referring your patient, Rosalie Sauceda, to the [facility]. Please see a copy of my visit note below.    Pre-procedure Nursing Note    Rosalie Sauceda presents to clinic for Vein Procedure  .   /Person Responsible for Patient: Nemo (Daughter)  Phone Number: [phone]    Prophylactic Medication:N/A   Sedation Medication: Ativan, 3mg ,   Time Taken: 1204 and Clonidine, 0.1mg,   Time Taken: 1204  Compression Stockings: Patient brought with today.  The procedure is being performed on RLE.  Patient understanding of procedure matches consent? YES    Patient's pre-procedure medications verified by AF.    Naty Rose RN on 2024 at 12:51 PM        [facility] Procedure Note    Rosalie Sauceda  2024    Rosalie Sauceda is a 49 year old year old female. She presents for Vein Procedure  .    /70   Pulse 61   SpO2 93%         2024     1:11 PM   Flowsheet Data   Procedure Start Time: 13:11   Prep: Chloraprep   Side: Right   Tx Length (cm): RIGHT GSV:17   Junction (cm): RIGHT GSV: 2.71   RF Cycles: RIGHT GSV:6   RF TX Time (Minutes): RIGHT GSV: 2:00   # PHLEB Sites: RIGHT LE   Sedation taken: Yes   Pre Pt. Physical / Cognitive Limitations: WNL   TOTAL Local anesthesia Injected (ml): 2   Max Volume Local Anesthesia (ml): 11   TOTAL Tumescent Injected volume (ml): 150   Max Volume Tumescent (ml): 572   Post Pt. Physical / Cognitive Limitations: WNL   Procedure End Time: 13:45   D/C Instructions given, states readiness to leave and escorted to car: Yes       Venus Closure    Date/Time: 2024 1:50 PM    Performed by: Reagan Martinez MD  Authorized by: Reagan Martinez MD    Time out: Immediately prior to the procedure a time out was called    Preparation: Patient was prepped and draped in usual sterile fashion    1st Assist:  Konrad  TRAM Wilder    Circulator:  Naty Rose RN    Procedure:  VNUS  Procedure side:  Right  One Vein    Vein Treated:  GSV  Wrap/Hose:  Wraps  Phlebectomy    Date/Time: 8/28/2024 1:50 PM    Performed by: Reagan Martinez MD  Authorized by: Reagan Martinez MD    Time out: Immediately prior to the procedure a time out was called    Preparation: Patient was prepped and draped in usual sterile fashion    1st Assist:  Konrad Wilder PA-C    Circulator:  Naty Rose RN    Procedure:  Phlebectomies  Type:  Medically Necessary  Procedure side:  Right  Stabs:  10-20  Patient tolerance:  Patient tolerated the procedure well with no immediate complications  Wrap/Hose:  Wraps      Details procedure:  With the cooperation of the patient in the preoperative holding area the right lower extremity was marked as well as the areas or staff back to me.  She was then taken to the procedure room and the right lower extremities prepped and draped in sterile fashion.  Timeout was performed confirm the identity of the patient as well as the procedure be performed.  The greater saphenous vein was then mapped down to an area in the distal thigh where the varicosities arose from the mid segment occlusion of the GSV.  It was then accessed in the distal thigh with a micropuncture needle and 6 Occitan sheath.  The catheter was then passed up the vein and positioned 2.71 cm in the saphenofemoral junction.  Tumescent solution was placed around the vein and radiofrequency ablation was then carried out in 7 cm segments.  The catheter and sheath were removed.  The previously marked areas for staff back with an infiltrate the local anesthetic.  Multiple stabs were made using ophthalmic blade and the veins remove the combination of katelynn hook mosquito clamps.  This was done for a total of 13 stabs.  Sterile dressings were applied and the patient when taken from the procedure back to the holding in stable condition to be sent home.    Reagan  MD Michelle, FACS      Again, thank you for allowing me to participate in the care of your patient.        Sincerely,        Reagan Martinez MD

## 2024-08-28 NOTE — PROGRESS NOTES
[facility] Procedure Note    Rosalie Sauceda  2024    Rosalie Sauceda is a 49 year old year old female. She presents for Vein Procedure  .    /70   Pulse 61   SpO2 93%         2024     1:11 PM   Flowsheet Data   Procedure Start Time: 13:11   Prep: Chloraprep   Side: Right   Tx Length (cm): RIGHT GSV:17   Junction (cm): RIGHT GSV: 2.71   RF Cycles: RIGHT GSV:6   RF TX Time (Minutes): RIGHT GSV: 2:00   # PHLEB Sites: RIGHT LE   Sedation taken: Yes   Pre Pt. Physical / Cognitive Limitations: WNL   TOTAL Local anesthesia Injected (ml): 2   Max Volume Local Anesthesia (ml): 11   TOTAL Tumescent Injected volume (ml): 150   Max Volume Tumescent (ml): 572   Post Pt. Physical / Cognitive Limitations: WNL   Procedure End Time: 13:45   D/C Instructions given, states readiness to leave and escorted to car: Yes       Venus Closure    Date/Time: 2024 1:50 PM    Performed by: Reagan Martinez MD  Authorized by: Reagan Martinez MD    Time out: Immediately prior to the procedure a time out was called    Preparation: Patient was prepped and draped in usual sterile fashion    1st Assist:  Konrad Wilder PA-C    Circulator:  Naty Rose RN    Procedure:  VNUS  Procedure side:  Right  One Vein    Vein Treated:  GSV  Wrap/Hose:  Wraps  Phlebectomy    Date/Time: 2024 1:50 PM    Performed by: Reagan Martinez MD  Authorized by: Reagan Martinez MD    Time out: Immediately prior to the procedure a time out was called    Preparation: Patient was prepped and draped in usual sterile fashion    1st Assist:  Konrad Wilder PA-C    Circulator:  Naty Rose RN    Procedure:  Phlebectomies  Type:  Medically Necessary  Procedure side:  Right  Stabs:  10-20  Patient tolerance:  Patient tolerated the procedure well with no immediate complications  Wrap/Hose:  Wraps      Details procedure:  With the cooperation of the patient in the preoperative holding area the right lower extremity was marked  as well as the areas or staff back to me.  She was then taken to the procedure room and the right lower extremities prepped and draped in sterile fashion.  Timeout was performed confirm the identity of the patient as well as the procedure be performed.  The greater saphenous vein was then mapped down to an area in the distal thigh where the varicosities arose from the mid segment occlusion of the GSV.  It was then accessed in the distal thigh with a micropuncture needle and 6 Nepali sheath.  The catheter was then passed up the vein and positioned 2.71 cm in the saphenofemoral junction.  Tumescent solution was placed around the vein and radiofrequency ablation was then carried out in 7 cm segments.  The catheter and sheath were removed.  The previously marked areas for staff back with an infiltrate the local anesthetic.  Multiple stabs were made using ophthalmic blade and the veins remove the combination of katelynn hook mosquito clamps.  This was done for a total of 13 stabs.  Sterile dressings were applied and the patient when taken from the procedure back to the holding in stable condition to be sent home.    Reagan Martinez MD, FACS

## 2024-08-28 NOTE — PROGRESS NOTES
Pre-procedure Nursing Note    Rosalie Sauceda presents to clinic for Vein Procedure  .   /Person Responsible for Patient: Nemo (Daughter)  Phone Number: [phone]    Prophylactic Medication:N/A   Sedation Medication: Ativan, 3mg ,   Time Taken: 1204 and Clonidine, 0.1mg,   Time Taken: 1204  Compression Stockings: Patient brought with today.  The procedure is being performed on RLE.  Patient understanding of procedure matches consent? YES    Patient's pre-procedure medications verified by AF.    Naty Rose RN on 8/28/2024 at 12:51 PM

## 2024-08-30 ENCOUNTER — ALLIED HEALTH/NURSE VISIT (OUTPATIENT)
Dept: VASCULAR SURGERY | Facility: CLINIC | Age: 49
End: 2024-08-30
Attending: SPECIALIST
Payer: COMMERCIAL

## 2024-08-30 ENCOUNTER — ANCILLARY PROCEDURE (OUTPATIENT)
Dept: ULTRASOUND IMAGING | Facility: CLINIC | Age: 49
End: 2024-08-30
Attending: SPECIALIST
Payer: COMMERCIAL

## 2024-08-30 DIAGNOSIS — Z09 POSTOP CHECK: Primary | ICD-10-CM

## 2024-08-30 DIAGNOSIS — I83.813 VARICOSE VEINS OF LEG WITH PAIN, BILATERAL: ICD-10-CM

## 2024-08-30 PROCEDURE — 99207 PR NO CHARGE NURSE ONLY: CPT

## 2024-08-30 PROCEDURE — 93971 EXTREMITY STUDY: CPT | Mod: RT | Performed by: SPECIALIST

## 2024-08-30 NOTE — PROGRESS NOTES
August 30, 2024    [facility] Postoperative Nurse Note    Patient is here for her 48 hour postoperative visit.    Procedure: Right leg VNUS closure GSV (med nec), 13 Phlebs (med nec)  Procedure Date: 8/28/24  Surgeon: Dr. Martinez    Ultrasound Result: Negative for RLE DVT, Rt GSV is closed 11.0 mm from SFJ to mid thigh.     Physical Exam: Incisions are approximated without signs of infection.  Ecchymosis: moderate bruising to access and phlebectomy sites  Swelling: minimal   Paresthesia: Patient denies    Patient Questions or Concerns: none    Reviewed postoperative instructions with patient and provided her with written material of common things to expect from her procedure.  Assisted patient to don thigh high compression hose.    Patient's Next Vein Clinic Appointment: 9/25/24    Harish Cespedes RN  [facility]

## 2024-09-18 ENCOUNTER — TELEPHONE (OUTPATIENT)
Dept: VASCULAR SURGERY | Facility: CLINIC | Age: 49
End: 2024-09-18
Payer: COMMERCIAL

## 2024-09-18 NOTE — TELEPHONE ENCOUNTER
9/18/2024    Vein Clinic Preoperative Nurse Call    Procedure: Left leg VNUS closure GSV (med nec), 0.5 unit Phlebs ($) AS  * Denied phlebs change to cosmetic per RJK *  Date: 9/25/24  Surgeon: Dr. Martinez  Time: 1300  Check in time: 1200    Called patient and left a detailed message. Informed patient: when to check in (1200) to sign consent, to bring their preop medications in their original bottle with them (3mg ativan, 0.1mg clonidine). Patient will take the medications after signing the consent to the procedure. Instructed patient to wear loose-fitting comfortable clothing, and bring their compression hose. Ensured patient has a /someone that will be responsible for them the rest of the day. Once procedure is completed, we will keep patient in recovery for 30-45 mins, and call  with aftercare instructions. Informed patient, that if possible, they should sit in the backseat to elevate their leg on the ride home.    Pt needs Thigh High compression hose for procedure. Status of the hose: patient should have from first procedure 8/28/24.    Special instructions: Asked patient to call back to collect payment for cosmetic phlebectomies.     Patient understands if they have any of the following symptoms (fever, cough, shortness of breath, rash), they need to notify us immediately as they may need to cancel their procedure and reschedule for a later date.    Gave patient our call back number if any further questions or concerns.    Naty Rose RN  [facility]

## 2024-09-25 ENCOUNTER — OFFICE VISIT (OUTPATIENT)
Dept: VASCULAR SURGERY | Facility: CLINIC | Age: 49
End: 2024-09-25
Payer: COMMERCIAL

## 2024-09-25 VITALS — OXYGEN SATURATION: 95 % | DIASTOLIC BLOOD PRESSURE: 70 MMHG | HEART RATE: 69 BPM | SYSTOLIC BLOOD PRESSURE: 104 MMHG

## 2024-09-25 DIAGNOSIS — I83.813 VARICOSE VEINS OF LEG WITH PAIN, BILATERAL: Primary | ICD-10-CM

## 2024-09-25 PROCEDURE — 37799 UNLISTED PX VASCULAR SURGERY: CPT | Performed by: SPECIALIST

## 2024-09-25 PROCEDURE — 36475 ENDOVENOUS RF 1ST VEIN: CPT | Mod: LT | Performed by: SPECIALIST

## 2024-09-25 PROCEDURE — S9999 SALES TAX: HCPCS | Performed by: SPECIALIST

## 2024-09-25 PROCEDURE — 37765 STAB PHLEB VEINS XTR 10-20: CPT | Mod: LT | Performed by: SPECIALIST

## 2024-09-25 NOTE — PROGRESS NOTES
[facility] Procedure Note    Rosalie Sauceda  2024    Rosalie Sauceda is a 49 year old year old female. She presents for Vein Procedure  .    /70   Pulse 69   SpO2 95%         2024    12:37 PM   Flowsheet Data   Procedure Start Time: 12:37   Prep: Chloraprep   Side: Left   Tx Length (cm): LEFT GSV: 41.5   Junction (cm): LEFT GSV: 2.09   RF Cycles: LEFT GSV:11   RF TX Time (Minutes): LEFT GSV: 3:40   # PHLEB Sites: LEFT LE   Sedation taken: Yes   Pre Pt. Physical / Cognitive Limitations: WNL   TOTAL Local anesthesia Injected (ml): 7   Max Volume Local Anesthesia (ml): 11   TOTAL Tumescent Injected volume (ml): 175   Max Volume Tumescent (ml): 572   Post Pt. Physical / Cognitive Limitations: WNL   Procedure End Time: 13:04   D/C Instructions given, states readiness to leave and escorted to car: Yes       Venus Closure    Date/Time: 2024 1:09 PM    Performed by: Reagan Martinez MD  Authorized by: Reagan Martinez MD    Time out: Immediately prior to the procedure a time out was called    Preparation: Patient was prepped and draped in usual sterile fashion    1st Assist:  Mary Ellen Whaley CST/GENA    Circulator:  Naty Rose RN    Procedure:  VNUS  Procedure side:  Left  One Vein    Vein Treated:  GSV  Patient tolerance:  Patient tolerated the procedure well with no immediate complications  Wrap/Hose:  Wraps  Phlebectomy    Date/Time: 2024 1:09 PM    Performed by: Reagan Martinez MD  Authorized by: Reagan Martinez MD    Time out: Immediately prior to the procedure a time out was called    Preparation: Patient was prepped and draped in usual sterile fashion    1st Assist:  Mary Ellen Whaley CST/GENA    Circulator:  Naty Rose RN    Procedure:  Phlebectomies  Type:  Cosmetic  Procedure side:  Left  Session:  Limited  Patient tolerance:  Patient tolerated the procedure well with no immediate complications  Wrap/Hose:  Wraps    Details procedure:  With the  cooperation the patient in the preoperative holding area the left lower extremity was marked as well as the areas for stab phlebectomy.  She was taken to the procedure room and the left lower extremity was prepped and draped in a sterile fashion.  A timeout was performed confirm the identity of the patient as well as the procedure to be performed.  The greater saphenous vein was mapped over its entire length and an access point was chosen just above the knee.  The vein was accessed with a micropuncture needle and a 6 Persian sheath.  The catheter was then passed up the vein and positioned 2.09 cm in the saphenofemoral junction.  Tumescent solution was placed around the vein and radiofrequency ablation was carried out in 7 cm segments.  The catheter and sheath were removed and direct pressure was held for 5 minutes by the clock.  The previously marked areas for stab phlebectomy that infiltrated the local anesthetic.  The veins were then stabbed with an ophthalmic blade and the veins removed with combination of katelynn hook and mosquito clamps.  A total of 8 stabs was made.  Sterile dressings were applied and the patient was and taken from the procedure back to the holding area in stable condition to be sent home.    Reagan Martinez MD, FACS

## 2024-09-25 NOTE — PROGRESS NOTES
Pre-procedure Nursing Note    Rosalie Sauceda presents to clinic for Vein Procedure  .   /Person Responsible for Patient: Nemo (Daughter)  Phone Number: [phone]    Prophylactic Medication:N/A   Sedation Medication: Ativan, 3mg ,   Time Taken: 1200 and Clonidine, 0.1mg,   Time Taken: 1200  Compression Stockings: Patient left hose at home.  The procedure is being performed on LLE.  Patient understanding of procedure matches consent? YES    Patient's pre-procedure medications verified by AF.    Naty Rose RN on 9/25/2024 at 12:13 PM

## 2024-09-25 NOTE — LETTER
2024      Rosalie Sauceda  [address] [facility] [address]      Dear Colleague,    Thank you for referring your patient, Rosalie Sauceda, to the [facility] [place]. Please see a copy of my visit note below.    Pre-procedure Nursing Note    Rosalie Sauceda presents to clinic for Vein Procedure  .   /Person Responsible for Patient: Nemo (Daughter)  Phone Number: [phone]    Prophylactic Medication:N/A   Sedation Medication: Ativan, 3mg ,   Time Taken: 1200 and Clonidine, 0.1mg,   Time Taken: 1200  Compression Stockings: Patient left hose at home.  The procedure is being performed on LLE.  Patient understanding of procedure matches consent? YES    Patient's pre-procedure medications verified by AF.    Naty Rose RN on 2024 at 12:13 PM        [facility] Procedure Note    Rosalie Sauceda  2024    Rosalie Sauceda is a 49 year old year old female. She presents for Vein Procedure  .    /70   Pulse 69   SpO2 95%         2024    12:37 PM   Flowsheet Data   Procedure Start Time: 12:37   Prep: Chloraprep   Side: Left   Tx Length (cm): LEFT GSV: 41.5   Junction (cm): LEFT GSV: 2.09   RF Cycles: LEFT GSV:11   RF TX Time (Minutes): LEFT GSV: 3:40   # PHLEB Sites: LEFT LE   Sedation taken: Yes   Pre Pt. Physical / Cognitive Limitations: WNL   TOTAL Local anesthesia Injected (ml): 7   Max Volume Local Anesthesia (ml): 11   TOTAL Tumescent Injected volume (ml): 175   Max Volume Tumescent (ml): 572   Post Pt. Physical / Cognitive Limitations: WNL   Procedure End Time: 13:04   D/C Instructions given, states readiness to leave and escorted to car: Yes       Venus Closure    Date/Time: 2024 1:09 PM    Performed by: Reagan Martinez MD  Authorized by: Reagan Martinez MD    Time out: Immediately prior to the procedure a time out was called    Preparation: Patient was prepped and draped in usual sterile fashion    1st Assist:  Mary Ellen  TRACEY Whaley/GENA    Circulator:  Naty Rose RN    Procedure:  VNUS  Procedure side:  Left  One Vein    Vein Treated:  GSV  Patient tolerance:  Patient tolerated the procedure well with no immediate complications  Wrap/Hose:  Wraps  Phlebectomy    Date/Time: 9/25/2024 1:09 PM    Performed by: Reagan Martinez MD  Authorized by: Reagan Martinez MD    Time out: Immediately prior to the procedure a time out was called    Preparation: Patient was prepped and draped in usual sterile fashion    1st Assist:  Mary Ellen Whaley CST/GENA    Circulator:  Naty Rose RN    Procedure:  Phlebectomies  Type:  Cosmetic  Procedure side:  Left  Session:  Limited  Patient tolerance:  Patient tolerated the procedure well with no immediate complications  Wrap/Hose:  Wraps    Details procedure:  With the cooperation the patient in the preoperative holding area the left lower extremity was marked as well as the areas for stab phlebectomy.  She was taken to the procedure room and the left lower extremity was prepped and draped in a sterile fashion.  A timeout was performed confirm the identity of the patient as well as the procedure to be performed.  The greater saphenous vein was mapped over its entire length and an access point was chosen just above the knee.  The vein was accessed with a micropuncture needle and a 6 Chinese sheath.  The catheter was then passed up the vein and positioned 2.09 cm in the saphenofemoral junction.  Tumescent solution was placed around the vein and radiofrequency ablation was carried out in 7 cm segments.  The catheter and sheath were removed and direct pressure was held for 5 minutes by the clock.  The previously marked areas for stab phlebectomy that infiltrated the local anesthetic.  The veins were then stabbed with an ophthalmic blade and the veins removed with combination of katelynn hook and mosquito clamps.  A total of 8 stabs was made.  Sterile dressings were applied and the patient was and  taken from the procedure back to the holding area in stable condition to be sent home.    Reagan Martinez MD, FACS      Again, thank you for allowing me to participate in the care of your patient.        Sincerely,        Reagan Martinez MD

## 2024-09-27 ENCOUNTER — ANCILLARY PROCEDURE (OUTPATIENT)
Dept: ULTRASOUND IMAGING | Facility: CLINIC | Age: 49
End: 2024-09-27
Attending: SPECIALIST
Payer: COMMERCIAL

## 2024-09-27 ENCOUNTER — ALLIED HEALTH/NURSE VISIT (OUTPATIENT)
Dept: VASCULAR SURGERY | Facility: CLINIC | Age: 49
End: 2024-09-27
Attending: SPECIALIST
Payer: COMMERCIAL

## 2024-09-27 DIAGNOSIS — Z09 POSTOP CHECK: Primary | ICD-10-CM

## 2024-09-27 DIAGNOSIS — I83.813 VARICOSE VEINS OF LEG WITH PAIN, BILATERAL: ICD-10-CM

## 2024-09-27 PROCEDURE — 99207 PR NO CHARGE NURSE ONLY: CPT

## 2024-09-27 PROCEDURE — 93971 EXTREMITY STUDY: CPT | Mod: LT | Performed by: SPECIALIST

## 2024-09-27 NOTE — PROGRESS NOTES
September 27, 2024    Vein Clinic Postoperative Nurse Note    Patient is here for her 48 hour postoperative visit.    Procedure: Left leg VNUS closure GSV (med nec),0.5 unit Phlebs ($)  Procedure Date: 9/25/24  Surgeon: Dr. Martinez    Ultrasound Result: left lower extremity negative for DVT. Left GSV is closed 14.4mm from SFJ to distal thigh.     Physical Exam: Incisions are approximated without signs of infection.  Ecchymosis: moderate bruising  Swelling: minimal  Paresthesia: patient denies    Patient Questions or Concerns: no questions or concerns  Patient donned thigh high compression stocking by self.    Reviewed postoperative instructions with patient and provided her with written material of common things to expect from her procedure.    Patient's Next Vein Clinic Appointment: 6 week follow up 11/5/24-bilateral lower extremities.    Harish Cespedes RN  [facility]

## 2024-11-05 ENCOUNTER — OFFICE VISIT (OUTPATIENT)
Dept: VASCULAR SURGERY | Facility: CLINIC | Age: 49
End: 2024-11-05
Payer: COMMERCIAL

## 2024-11-05 DIAGNOSIS — Z09 POSTOP CHECK: Primary | ICD-10-CM

## 2024-11-05 PROCEDURE — 99207 PR VEINSOLUTIONS POST OPERATIVE VISIT: CPT | Performed by: SPECIALIST

## 2024-11-05 NOTE — PROGRESS NOTES
Follow-up for bilateral GSV RF ablations and stab phlebectomies.      Subjective:  Patient feels better.  Preop symptoms have resolved.  Does report some occasional tightness in the thighs.    Objective:  B/P: Data Unavailable, T: Data Unavailable, P: Data Unavailable, R: Data Unavailable  Lower extremities: No edema or residual varicosities.      Assessment/plan:  Patient is status post a bilateral GSV RF ablation with stab phlebectomies.  Legs much improved.  She will increase her activity as tolerated and follow-up with us in 6 months as per protocol.    Reagan Martinez MD, FACS

## 2024-11-05 NOTE — LETTER
11/5/2024      Rosalie Sauceda  [address] [facility] [address]      Dear Colleague,    Thank you for referring your patient, Rosalie Sauceda, to the [facility] [place]. Please see a copy of my visit note below.    Follow-up for bilateral GSV RF ablations and stab phlebectomies.      Subjective:  Patient feels better.  Preop symptoms have resolved.  Does report some occasional tightness in the thighs.    Objective:  B/P: Data Unavailable, T: Data Unavailable, P: Data Unavailable, R: Data Unavailable  Lower extremities: No edema or residual varicosities.      Assessment/plan:  Patient is status post a bilateral GSV RF ablation with stab phlebectomies.  Legs much improved.  She will increase her activity as tolerated and follow-up with us in 6 months as per protocol.    Reagan Martinez MD, FACS      Again, thank you for allowing me to participate in the care of your patient.        Sincerely,        Reagan Martinez MD

## 2024-11-11 ENCOUNTER — ANCILLARY PROCEDURE (OUTPATIENT)
Dept: MAMMOGRAPHY | Facility: CLINIC | Age: 49
End: 2024-11-11
Payer: COMMERCIAL

## 2024-11-11 DIAGNOSIS — Z12.31 VISIT FOR SCREENING MAMMOGRAM: ICD-10-CM

## 2024-11-11 PROCEDURE — 77063 BREAST TOMOSYNTHESIS BI: CPT | Mod: TC | Performed by: RADIOLOGY

## 2024-11-11 PROCEDURE — 77067 SCR MAMMO BI INCL CAD: CPT | Mod: TC | Performed by: RADIOLOGY

## 2025-01-14 ENCOUNTER — PATIENT OUTREACH (OUTPATIENT)
Dept: CARE COORDINATION | Facility: CLINIC | Age: 50
End: 2025-01-14
Payer: COMMERCIAL

## 2025-01-16 PROBLEM — M54.41 ACUTE BILATERAL LOW BACK PAIN WITH BILATERAL SCIATICA: Status: RESOLVED | Noted: 2024-05-09 | Resolved: 2025-01-16

## 2025-01-16 PROBLEM — M54.42 ACUTE BILATERAL LOW BACK PAIN WITH BILATERAL SCIATICA: Status: RESOLVED | Noted: 2024-05-09 | Resolved: 2025-01-16

## 2025-01-28 ENCOUNTER — PATIENT OUTREACH (OUTPATIENT)
Dept: CARE COORDINATION | Facility: CLINIC | Age: 50
End: 2025-01-28
Payer: COMMERCIAL

## 2025-03-29 ENCOUNTER — HEALTH MAINTENANCE LETTER (OUTPATIENT)
Age: 50
End: 2025-03-29

## 2025-04-09 ENCOUNTER — OFFICE VISIT (OUTPATIENT)
Dept: OBGYN | Facility: CLINIC | Age: 50
End: 2025-04-09
Payer: COMMERCIAL

## 2025-04-09 VITALS
SYSTOLIC BLOOD PRESSURE: 118 MMHG | RESPIRATION RATE: 18 BRPM | DIASTOLIC BLOOD PRESSURE: 78 MMHG | TEMPERATURE: 97.9 F | WEIGHT: 196 LBS | BODY MASS INDEX: 29.7 KG/M2 | HEIGHT: 68 IN | HEART RATE: 80 BPM

## 2025-04-09 DIAGNOSIS — Z30.433 ENCOUNTER FOR REMOVAL AND REINSERTION OF INTRAUTERINE CONTRACEPTIVE DEVICE (IUD): Primary | ICD-10-CM

## 2025-04-09 DIAGNOSIS — Z30.433 ENCOUNTER FOR REMOVAL AND REINSERTION OF INTRAUTERINE CONTRACEPTIVE DEVICE: ICD-10-CM

## 2025-04-09 DIAGNOSIS — Z30.430 ENCOUNTER FOR INSERTION OF MIRENA IUD: ICD-10-CM

## 2025-04-09 LAB
HCG UR QL: NEGATIVE
INTERNAL QC OK POCT: NORMAL
POCT KIT EXPIRATION DATE: NORMAL
POCT KIT LOT NUMBER: NORMAL

## 2025-04-09 PROCEDURE — 58300 INSERT INTRAUTERINE DEVICE: CPT | Performed by: PHYSICIAN ASSISTANT

## 2025-04-09 PROCEDURE — 58301 REMOVE INTRAUTERINE DEVICE: CPT | Performed by: PHYSICIAN ASSISTANT

## 2025-04-09 PROCEDURE — 3074F SYST BP LT 130 MM HG: CPT | Performed by: PHYSICIAN ASSISTANT

## 2025-04-09 PROCEDURE — 81025 URINE PREGNANCY TEST: CPT | Performed by: PHYSICIAN ASSISTANT

## 2025-04-09 PROCEDURE — 3078F DIAST BP <80 MM HG: CPT | Performed by: PHYSICIAN ASSISTANT

## 2025-04-09 NOTE — NURSING NOTE
"Initial /78 (BP Location: Right arm, Patient Position: Chair, Cuff Size: Adult Large)   Pulse 80   Temp 97.9  F (36.6  C) (Tympanic)   Resp 18   Ht 1.727 m (5' 8\")   Wt 88.9 kg (196 lb)   BMI 29.80 kg/m   Estimated body mass index is 29.8 kg/m  as calculated from the following:    Height as of this encounter: 1.727 m (5' 8\").    Weight as of this encounter: 88.9 kg (196 lb). .    "

## 2025-04-09 NOTE — PROGRESS NOTES
SUBJECTIVE:    Is a pregnancy test required: Yes.  Was it positive or negative?  Negative  Was a consent obtained?  Yes    Subjective: Rosalie Sauceda is a 50 year old  presents for IUD and desires Mirena type IUD.  She requests removal of the IUD because the IUD effectiveness has     Patient has been given the opportunity to ask questions about all forms of birth control, including all options appropriate for Rosalie Sauceda. Discussed that no method of birth control, except abstinence is 100% effective against pregnancy or sexually transmitted infection.     Rosalie Sauceda understands she may have the IUD removed at any time. IUD should be removed by a health care provider and the current IUD will be removed today.    The entire removal and insertion procedure was reviewed with the patient, including care after placement.    Today's PHQ-2 Score:       2025     9:03 AM   PHQ-2 (  [employer])   Q1: Little interest or pleasure in doing things 0   Q2: Feeling down, depressed or hopeless 0   PHQ-2 Score 0    Q1: Little interest or pleasure in doing things Not at all   Q2: Feeling down, depressed or hopeless Not at all   PHQ-2 Score 0       Patient-reported         PROCEDURE:    Topical lidocaine gel applied to cervix   A speculum exam was performed and the cervix was visualized. The IUD string was visualized. Using ring forceps, the string  was grasped and the IUD removed intact.    Under sterile technique, cervix was visualized with speculum and prepped with Betadine solution swab x 3. Allis was placed for stability. The uterus was gently straightened and sounded to 7.0 cm. IUD prepared for placement, and IUD inserted according to 's instructions without difficulty or significant ressitance, and deployed at the fundus. The strings were visualized and trimmed to 2.5 cm from the external os. Tenaculum was removed and hemostasis noted. Speculum removed.  Patient tolerated procedure  well.    EBL: minimal    Complications: none      POST PROCEDURE:    Given 's handouts, including when to have IUD removed, list of danger s/sx, side effects and follow up recommended. Encouraged condom use for prevention of STD. Advised to call for any fever, for prolonged or severe pain or bleeding, abnormal vaginal dischage, or unable to palpate strings. She was advised to use pain medications (ibuprofen) as needed for mild to moderate pain. Advised to follow-up in clinic in 4-6 weeks for IUD string check if unable to find strings or as directed by provider.     Delmis Oliveira PA-C

## 2025-04-27 ENCOUNTER — E-VISIT (OUTPATIENT)
Dept: URGENT CARE | Facility: CLINIC | Age: 50
End: 2025-04-27
Payer: COMMERCIAL

## 2025-04-27 DIAGNOSIS — J01.90 ACUTE SINUSITIS WITH SYMPTOMS > 10 DAYS: Primary | ICD-10-CM

## 2025-04-27 RX ORDER — DOXYCYCLINE HYCLATE 100 MG
100 TABLET ORAL 2 TIMES DAILY
Qty: 14 TABLET | Refills: 0 | Status: SHIPPED | OUTPATIENT
Start: 2025-04-27 | End: 2025-05-04

## 2025-04-27 NOTE — PATIENT INSTRUCTIONS
Acute Sinusitis: Care Instructions  Overview     Acute sinusitis is an inflammation of the mucous membranes inside the nose and sinuses. Sinuses are the hollow spaces in your skull around the eyes and nose. Acute sinusitis often follows a cold. Acute sinusitis causes thick, discolored mucus that drains from the nose or down the back of the throat. It also can cause pain and pressure in your head and face along with a stuffy or blocked nose.  In most cases, sinusitis gets better on its own in 1 to 2 weeks. But some mild symptoms may last for several weeks. Sometimes antibiotics are needed if there is a bacterial infection.  Follow-up care is a key part of your treatment and safety. Be sure to make and go to all appointments, and call your doctor if you are having problems. It's also a good idea to know your test results and keep a list of the medicines you take.  How can you care for yourself at home?  Use saline (saltwater) nasal washes. This can help keep your nasal passages open and wash out mucus and allergens.  You can buy saline nose washes at a grocery store or drugstore. Follow the instructions on the package.  You can make your own at home. Add 1 teaspoon of non-iodized salt and 1 teaspoon of baking soda to 2 cups of distilled or boiled and cooled water. Fill a squeeze bottle or a nasal cleansing pot (such as a neti pot) with the nasal wash. Then put the tip into your nostril, and lean over the sink. With your mouth open, gently squirt the liquid. Repeat on the other side.  Try a decongestant nasal spray like oxymetazoline (Afrin). Do not use it for more than 3 days in a row. Using it for more than 3 days can make your congestion worse.  If needed, take an over-the-counter pain medicine, such as acetaminophen (Tylenol), ibuprofen (Advil, Motrin), or naproxen (Aleve). Read and follow all instructions on the label.  If the doctor prescribed antibiotics, take them as directed. Do not stop taking them just  "because you feel better. You need to take the full course of antibiotics.  Be careful when taking over-the-counter cold or flu medicines and Tylenol at the same time. Many of these medicines have acetaminophen, which is Tylenol. Read the labels to make sure that you are not taking more than the recommended dose. Too much acetaminophen (Tylenol) can be harmful.  Try a steroid nasal spray. It may help with your symptoms.  Breathe warm, moist air. You can use a steamy shower, a hot bath, or a sink filled with hot water. Avoid cold, dry air. Using a humidifier in your home may help. Follow the directions for cleaning the machine.  When should you call for help?   Call your doctor now or seek immediate medical care if:    You have new or worse swelling, redness, or pain in your face or around one or both of your eyes.     You have double vision or a change in your vision.     You have a high fever.     You have a severe headache and a stiff neck.     You have mental changes, such as feeling confused or much less alert.   Watch closely for changes in your health, and be sure to contact your doctor if:    You are not getting better as expected.   Where can you learn more?  Go to https://www.[employer].net/patiented  Enter I933 in the search box to learn more about \"Acute Sinusitis: Care Instructions.\"  Current as of: October 27, 2024  Content Version: 14.4    9767-1592 [employer].   Care instructions adapted under license by your healthcare professional. If you have questions about a medical condition or this instruction, always ask your healthcare professional. [employer] disclaims any warranty or liability for your use of this information.    Dear Rosalie Sauceda    After reviewing your responses, I've been able to diagnose you with sinusitis.      Based on your responses and diagnosis, I have prescribed doxycycline to treat your symptoms. I have sent this to your pharmacy.?     It is also " important to stay well hydrated, get lots of rest and take over-the-counter decongestants,?tylenol?or ibuprofen if you?are able to?take those medications per your primary care provider to help relieve discomfort.?     It is important that you take?all of?your prescribed medication even if your symptoms are improving after a few doses.? Taking?all of?your medicine helps prevent the symptoms from returning.?     If your symptoms worsen, you develop severe headache, vomiting, high fever (>102), or are not improving in 7 days, please contact your primary care provider for an appointment or visit any of our convenient Walk-in Care or Urgent Care Centers to be seen which can be found on our website?here.?     Thanks again for choosing?us?as your health care partner,?   ?  Mary Almendarez, HILARY?

## 2025-06-03 ENCOUNTER — OFFICE VISIT (OUTPATIENT)
Dept: VASCULAR SURGERY | Facility: CLINIC | Age: 50
End: 2025-06-03
Attending: SPECIALIST
Payer: COMMERCIAL

## 2025-06-03 ENCOUNTER — ANCILLARY PROCEDURE (OUTPATIENT)
Dept: ULTRASOUND IMAGING | Facility: CLINIC | Age: 50
End: 2025-06-03
Attending: SPECIALIST
Payer: COMMERCIAL

## 2025-06-03 DIAGNOSIS — I83.813 VARICOSE VEINS OF LEG WITH PAIN, BILATERAL: Primary | ICD-10-CM

## 2025-06-03 DIAGNOSIS — Z09 POSTOP CHECK: ICD-10-CM

## 2025-06-03 PROCEDURE — 99212 OFFICE O/P EST SF 10 MIN: CPT | Performed by: SPECIALIST

## 2025-06-03 PROCEDURE — 93970 EXTREMITY STUDY: CPT | Performed by: SURGERY

## 2025-06-03 NOTE — PROGRESS NOTES
6-month follow-up for bilateral GSV RF ablations and stab phlebectomies.    Subjective:  Patient feels good.  Preop symptoms remain resolved.  Still some occasional tightness along the tract of the vein in the thighs.  About the same.      Objective:  B/P: Data Unavailable, T: Data Unavailable, P: Data Unavailable, R: Data Unavailable  Lower extremities: No obvious residual varicosities.  Some new spider veins.    Ultrasound preliminary: Right GSV close 27.1 mm from the SFJ to distal thigh.  Left GSV is closed 13.6 mm from the SFJ to distal thigh.    Assessment/plan:  Patient is status post bilateral GSV RF ablation with stab phlebectomies.  Legs remain improved.  She will increase her activity as tolerated.  She will start some massage in the tight areas in the thighs.  She will follow-up as necessary.    Reagan Martinez MD, FACS

## 2025-06-03 NOTE — LETTER
6/3/2025      Rosalie Sauceda  [address] [facility] [address]      Dear Colleague,    Thank you for referring your patient, Rosalie Sauceda, to the [facility]. Please see a copy of my visit note below.    6-month follow-up for bilateral GSV RF ablations and stab phlebectomies.    Subjective:  Patient feels good.  Preop symptoms remain resolved.  Still some occasional tightness along the tract of the vein in the thighs.  About the same.      Objective:  B/P: Data Unavailable, T: Data Unavailable, P: Data Unavailable, R: Data Unavailable  Lower extremities: No obvious residual varicosities.  Some new spider veins.    Ultrasound preliminary: Right GSV close 27.1 mm from the SFJ to distal thigh.  Left GSV is closed 13.6 mm from the SFJ to distal thigh.    Assessment/plan:  Patient is status post bilateral GSV RF ablation with stab phlebectomies.  Legs remain improved.  She will increase her activity as tolerated.  She will start some massage in the tight areas in the thighs.  She will follow-up as necessary.    Reagan Martinez MD, FACS    Again, thank you for allowing me to participate in the care of your patient.        Sincerely,        Reagan Martinez MD    Electronically signed

## 2025-08-12 ENCOUNTER — PATIENT OUTREACH (OUTPATIENT)
Dept: CARE COORDINATION | Facility: CLINIC | Age: 50
End: 2025-08-12
Payer: COMMERCIAL

## (undated) DEVICE — ENDO SNARE EXACTO COLD 9MM LOOP 2.4MMX230CM 00711115